# Patient Record
Sex: MALE | Race: WHITE | Employment: OTHER | URBAN - METROPOLITAN AREA
[De-identification: names, ages, dates, MRNs, and addresses within clinical notes are randomized per-mention and may not be internally consistent; named-entity substitution may affect disease eponyms.]

---

## 2017-08-11 ENCOUNTER — APPOINTMENT (OUTPATIENT)
Dept: GENERAL RADIOLOGY | Age: 82
DRG: 251 | End: 2017-08-11
Payer: MEDICARE

## 2017-08-11 ENCOUNTER — HOSPITAL ENCOUNTER (INPATIENT)
Age: 82
LOS: 3 days | Discharge: HOME | DRG: 251 | End: 2017-08-15
Attending: EMERGENCY MEDICINE | Admitting: INTERNAL MEDICINE
Payer: MEDICARE

## 2017-08-11 DIAGNOSIS — R91.8 PULMONARY MASS: ICD-10-CM

## 2017-08-11 DIAGNOSIS — J18.9 PNEUMONIA DUE TO ORGANISM: ICD-10-CM

## 2017-08-11 DIAGNOSIS — R07.9 CHEST PAIN, UNSPECIFIED TYPE: Primary | ICD-10-CM

## 2017-08-11 LAB
ANION GAP SERPL CALCULATED.3IONS-SCNC: 22 MEQ/L (ref 7–13)
BASOPHILS ABSOLUTE: 0.2 K/UL (ref 0–0.2)
BASOPHILS RELATIVE PERCENT: 0.9 %
BUN BLDV-MCNC: 49 MG/DL (ref 8–23)
CALCIUM SERPL-MCNC: 9.8 MG/DL (ref 8.6–10.2)
CHLORIDE BLD-SCNC: 104 MEQ/L (ref 98–107)
CO2: 18 MEQ/L (ref 22–29)
CREAT SERPL-MCNC: 2.08 MG/DL (ref 0.7–1.2)
EOSINOPHILS ABSOLUTE: 0 K/UL (ref 0–0.7)
EOSINOPHILS RELATIVE PERCENT: 0 %
GFR AFRICAN AMERICAN: 36.7
GFR NON-AFRICAN AMERICAN: 30.4
GLUCOSE BLD-MCNC: 335 MG/DL (ref 74–109)
HCT VFR BLD CALC: 43.9 % (ref 42–52)
HEMOGLOBIN: 14.1 G/DL (ref 14–18)
INR BLD: 1
LYMPHOCYTES ABSOLUTE: 0.6 K/UL (ref 1–4.8)
LYMPHOCYTES RELATIVE PERCENT: 3 %
MCH RBC QN AUTO: 28.3 PG (ref 27–31.3)
MCHC RBC AUTO-ENTMCNC: 32 % (ref 33–37)
MCV RBC AUTO: 88.3 FL (ref 80–100)
MONOCYTES ABSOLUTE: 0.6 K/UL (ref 0.2–0.8)
MONOCYTES RELATIVE PERCENT: 3.2 %
NEUTROPHILS ABSOLUTE: 17.8 K/UL (ref 1.4–6.5)
NEUTROPHILS RELATIVE PERCENT: 92.9 %
PDW BLD-RTO: 14.7 % (ref 11.5–14.5)
PLATELET # BLD: 239 K/UL (ref 130–400)
POC CREATININE WHOLE BLOOD: 2.2
POTASSIUM SERPL-SCNC: 4.4 MEQ/L (ref 3.5–5.1)
PROTHROMBIN TIME: 10.7 SEC (ref 8.1–13.7)
RBC # BLD: 4.97 M/UL (ref 4.7–6.1)
SODIUM BLD-SCNC: 144 MEQ/L (ref 132–144)
TROPONIN: 0.14 NG/ML (ref 0–0.01)
WBC # BLD: 19.2 K/UL (ref 4.8–10.8)

## 2017-08-11 PROCEDURE — 84484 ASSAY OF TROPONIN QUANT: CPT

## 2017-08-11 PROCEDURE — 93005 ELECTROCARDIOGRAM TRACING: CPT

## 2017-08-11 PROCEDURE — 80048 BASIC METABOLIC PNL TOTAL CA: CPT

## 2017-08-11 PROCEDURE — 6360000002 HC RX W HCPCS: Performed by: EMERGENCY MEDICINE

## 2017-08-11 PROCEDURE — 85025 COMPLETE CBC W/AUTO DIFF WBC: CPT

## 2017-08-11 PROCEDURE — 2580000003 HC RX 258: Performed by: EMERGENCY MEDICINE

## 2017-08-11 PROCEDURE — 36415 COLL VENOUS BLD VENIPUNCTURE: CPT

## 2017-08-11 PROCEDURE — 71010 XR CHEST PORTABLE: CPT

## 2017-08-11 PROCEDURE — 94640 AIRWAY INHALATION TREATMENT: CPT

## 2017-08-11 PROCEDURE — 96374 THER/PROPH/DIAG INJ IV PUSH: CPT

## 2017-08-11 PROCEDURE — 99285 EMERGENCY DEPT VISIT HI MDM: CPT

## 2017-08-11 PROCEDURE — 85610 PROTHROMBIN TIME: CPT

## 2017-08-11 RX ORDER — SODIUM CHLORIDE 0.9 % (FLUSH) 0.9 %
10 SYRINGE (ML) INJECTION EVERY 12 HOURS SCHEDULED
Status: DISCONTINUED | OUTPATIENT
Start: 2017-08-11 | End: 2017-08-15 | Stop reason: HOSPADM

## 2017-08-11 RX ORDER — SODIUM CHLORIDE 0.9 % (FLUSH) 0.9 %
3 SYRINGE (ML) INJECTION EVERY 8 HOURS
Status: DISCONTINUED | OUTPATIENT
Start: 2017-08-11 | End: 2017-08-12

## 2017-08-11 RX ORDER — SODIUM CHLORIDE 0.9 % (FLUSH) 0.9 %
10 SYRINGE (ML) INJECTION PRN
Status: DISCONTINUED | OUTPATIENT
Start: 2017-08-11 | End: 2017-08-15 | Stop reason: HOSPADM

## 2017-08-11 RX ORDER — AZITHROMYCIN 500 MG/1
500 TABLET, FILM COATED ORAL ONCE
Status: COMPLETED | OUTPATIENT
Start: 2017-08-11 | End: 2017-08-12

## 2017-08-11 RX ORDER — ACETAMINOPHEN 325 MG/1
650 TABLET ORAL EVERY 4 HOURS PRN
Status: DISCONTINUED | OUTPATIENT
Start: 2017-08-11 | End: 2017-08-15 | Stop reason: HOSPADM

## 2017-08-11 RX ORDER — METHYLPREDNISOLONE SODIUM SUCCINATE 125 MG/2ML
125 INJECTION, POWDER, LYOPHILIZED, FOR SOLUTION INTRAMUSCULAR; INTRAVENOUS ONCE
Status: COMPLETED | OUTPATIENT
Start: 2017-08-11 | End: 2017-08-11

## 2017-08-11 RX ORDER — ONDANSETRON 4 MG/1
4 TABLET, ORALLY DISINTEGRATING ORAL EVERY 8 HOURS PRN
Status: DISCONTINUED | OUTPATIENT
Start: 2017-08-11 | End: 2017-08-15 | Stop reason: HOSPADM

## 2017-08-11 RX ORDER — MORPHINE SULFATE 2 MG/ML
2 INJECTION, SOLUTION INTRAMUSCULAR; INTRAVENOUS
Status: DISCONTINUED | OUTPATIENT
Start: 2017-08-11 | End: 2017-08-15 | Stop reason: HOSPADM

## 2017-08-11 RX ORDER — LORAZEPAM 2 MG/ML
0.5 INJECTION INTRAMUSCULAR ONCE
Status: COMPLETED | OUTPATIENT
Start: 2017-08-11 | End: 2017-08-12

## 2017-08-11 RX ORDER — MORPHINE SULFATE 4 MG/ML
4 INJECTION, SOLUTION INTRAMUSCULAR; INTRAVENOUS
Status: DISCONTINUED | OUTPATIENT
Start: 2017-08-11 | End: 2017-08-15 | Stop reason: HOSPADM

## 2017-08-11 RX ORDER — LEVOTHYROXINE SODIUM 0.03 MG/1
25 TABLET ORAL DAILY
COMMUNITY

## 2017-08-11 RX ORDER — MORPHINE SULFATE 2 MG/ML
2 INJECTION, SOLUTION INTRAMUSCULAR; INTRAVENOUS ONCE
Status: COMPLETED | OUTPATIENT
Start: 2017-08-11 | End: 2017-08-12

## 2017-08-11 RX ADMIN — ALBUTEROL SULFATE 5 MG: 2.5 SOLUTION RESPIRATORY (INHALATION) at 22:54

## 2017-08-11 RX ADMIN — METHYLPREDNISOLONE SODIUM SUCCINATE 125 MG: 125 INJECTION, POWDER, FOR SOLUTION INTRAMUSCULAR; INTRAVENOUS at 22:50

## 2017-08-11 RX ADMIN — SODIUM CHLORIDE, PRESERVATIVE FREE 3 ML: 5 INJECTION INTRAVENOUS at 22:52

## 2017-08-12 PROBLEM — R07.9 CHEST PAIN: Status: ACTIVE | Noted: 2017-08-12

## 2017-08-12 LAB
ANION GAP SERPL CALCULATED.3IONS-SCNC: 16 MEQ/L (ref 7–13)
BUN BLDV-MCNC: 49 MG/DL (ref 8–23)
CALCIUM SERPL-MCNC: 9.2 MG/DL (ref 8.6–10.2)
CHLORIDE BLD-SCNC: 102 MEQ/L (ref 98–107)
CO2: 20 MEQ/L (ref 22–29)
CREAT SERPL-MCNC: 1.58 MG/DL (ref 0.7–1.2)
GFR AFRICAN AMERICAN: 50.5
GFR NON-AFRICAN AMERICAN: 41.7
GLUCOSE BLD-MCNC: 194 MG/DL (ref 60–115)
GLUCOSE BLD-MCNC: 305 MG/DL (ref 74–109)
GLUCOSE BLD-MCNC: 404 MG/DL (ref 60–115)
GLUCOSE BLD-MCNC: 438 MG/DL (ref 60–115)
HCT VFR BLD CALC: 39.9 % (ref 42–52)
HEMOGLOBIN: 13 G/DL (ref 14–18)
MCH RBC QN AUTO: 28.3 PG (ref 27–31.3)
MCHC RBC AUTO-ENTMCNC: 32.6 % (ref 33–37)
MCV RBC AUTO: 86.5 FL (ref 80–100)
PDW BLD-RTO: 14.4 % (ref 11.5–14.5)
PERFORMED ON: ABNORMAL
PLATELET # BLD: 195 K/UL (ref 130–400)
POTASSIUM SERPL-SCNC: 4.2 MEQ/L (ref 3.5–5.1)
RBC # BLD: 4.61 M/UL (ref 4.7–6.1)
SODIUM BLD-SCNC: 138 MEQ/L (ref 132–144)
TROPONIN: 0.08 NG/ML (ref 0–0.01)
TROPONIN: 0.1 NG/ML (ref 0–0.01)
WBC # BLD: 18.9 K/UL (ref 4.8–10.8)

## 2017-08-12 PROCEDURE — 2580000003 HC RX 258: Performed by: EMERGENCY MEDICINE

## 2017-08-12 PROCEDURE — 36415 COLL VENOUS BLD VENIPUNCTURE: CPT

## 2017-08-12 PROCEDURE — 6370000000 HC RX 637 (ALT 250 FOR IP): Performed by: INTERNAL MEDICINE

## 2017-08-12 PROCEDURE — 6370000000 HC RX 637 (ALT 250 FOR IP): Performed by: EMERGENCY MEDICINE

## 2017-08-12 PROCEDURE — 80048 BASIC METABOLIC PNL TOTAL CA: CPT

## 2017-08-12 PROCEDURE — 6360000002 HC RX W HCPCS: Performed by: EMERGENCY MEDICINE

## 2017-08-12 PROCEDURE — 84484 ASSAY OF TROPONIN QUANT: CPT

## 2017-08-12 PROCEDURE — 87040 BLOOD CULTURE FOR BACTERIA: CPT

## 2017-08-12 PROCEDURE — 93005 ELECTROCARDIOGRAM TRACING: CPT

## 2017-08-12 PROCEDURE — 2060000000 HC ICU INTERMEDIATE R&B

## 2017-08-12 PROCEDURE — 85027 COMPLETE CBC AUTOMATED: CPT

## 2017-08-12 RX ORDER — METOPROLOL SUCCINATE 50 MG/1
50 TABLET, EXTENDED RELEASE ORAL DAILY
Status: DISCONTINUED | OUTPATIENT
Start: 2017-08-12 | End: 2017-08-15 | Stop reason: HOSPADM

## 2017-08-12 RX ORDER — AMLODIPINE BESYLATE 5 MG/1
5 TABLET ORAL DAILY
Status: DISCONTINUED | OUTPATIENT
Start: 2017-08-12 | End: 2017-08-15 | Stop reason: HOSPADM

## 2017-08-12 RX ORDER — CLOPIDOGREL 300 MG/1
600 TABLET, FILM COATED ORAL ONCE
Status: COMPLETED | OUTPATIENT
Start: 2017-08-12 | End: 2017-08-12

## 2017-08-12 RX ORDER — NICOTINE POLACRILEX 4 MG
15 LOZENGE BUCCAL PRN
Status: DISCONTINUED | OUTPATIENT
Start: 2017-08-12 | End: 2017-08-15 | Stop reason: HOSPADM

## 2017-08-12 RX ORDER — DEXTROSE MONOHYDRATE 25 G/50ML
12.5 INJECTION, SOLUTION INTRAVENOUS PRN
Status: DISCONTINUED | OUTPATIENT
Start: 2017-08-12 | End: 2017-08-15 | Stop reason: HOSPADM

## 2017-08-12 RX ORDER — ASPIRIN 81 MG/1
81 TABLET ORAL DAILY
Status: DISCONTINUED | OUTPATIENT
Start: 2017-08-12 | End: 2017-08-15 | Stop reason: HOSPADM

## 2017-08-12 RX ORDER — DEXTROSE MONOHYDRATE 50 MG/ML
100 INJECTION, SOLUTION INTRAVENOUS PRN
Status: DISCONTINUED | OUTPATIENT
Start: 2017-08-12 | End: 2017-08-15 | Stop reason: HOSPADM

## 2017-08-12 RX ORDER — ROPINIROLE 2 MG/1
2 TABLET, FILM COATED ORAL NIGHTLY
COMMUNITY
Start: 2017-08-12

## 2017-08-12 RX ORDER — ISOSORBIDE MONONITRATE 30 MG/1
30 TABLET, EXTENDED RELEASE ORAL DAILY
Status: DISCONTINUED | OUTPATIENT
Start: 2017-08-12 | End: 2017-08-15 | Stop reason: HOSPADM

## 2017-08-12 RX ORDER — ACETAMINOPHEN 80 MG
TABLET,CHEWABLE ORAL ONCE
Status: DISCONTINUED | OUTPATIENT
Start: 2017-08-12 | End: 2017-08-15 | Stop reason: HOSPADM

## 2017-08-12 RX ORDER — CLOPIDOGREL BISULFATE 75 MG/1
75 TABLET ORAL DAILY
Status: DISCONTINUED | OUTPATIENT
Start: 2017-08-13 | End: 2017-08-15 | Stop reason: HOSPADM

## 2017-08-12 RX ORDER — PANTOPRAZOLE SODIUM 40 MG/1
40 TABLET, DELAYED RELEASE ORAL
Status: DISCONTINUED | OUTPATIENT
Start: 2017-08-12 | End: 2017-08-15 | Stop reason: HOSPADM

## 2017-08-12 RX ADMIN — CEFTRIAXONE SODIUM 1 G: 1 INJECTION, POWDER, FOR SOLUTION INTRAMUSCULAR; INTRAVENOUS at 00:20

## 2017-08-12 RX ADMIN — PANTOPRAZOLE SODIUM 40 MG: 40 TABLET, DELAYED RELEASE ORAL at 09:07

## 2017-08-12 RX ADMIN — AMLODIPINE BESYLATE 5 MG: 5 TABLET ORAL at 09:08

## 2017-08-12 RX ADMIN — LORAZEPAM 0.5 MG: 2 INJECTION INTRAMUSCULAR; INTRAVENOUS at 00:19

## 2017-08-12 RX ADMIN — CARBIDOPA AND LEVODOPA 1.5 TABLET: 25; 100 TABLET ORAL at 21:40

## 2017-08-12 RX ADMIN — METOPROLOL SUCCINATE 50 MG: 50 TABLET, EXTENDED RELEASE ORAL at 13:48

## 2017-08-12 RX ADMIN — ENOXAPARIN SODIUM 40 MG: 40 INJECTION SUBCUTANEOUS at 21:41

## 2017-08-12 RX ADMIN — CARBIDOPA AND LEVODOPA 1.5 TABLET: 25; 100 TABLET ORAL at 13:48

## 2017-08-12 RX ADMIN — AZITHROMYCIN 500 MG: 500 TABLET, FILM COATED ORAL at 00:20

## 2017-08-12 RX ADMIN — CARBIDOPA AND LEVODOPA 1.5 TABLET: 25; 100 TABLET ORAL at 09:08

## 2017-08-12 RX ADMIN — CLOPIDOGREL BISULFATE 600 MG: 300 TABLET, FILM COATED ORAL at 13:49

## 2017-08-12 RX ADMIN — Medication 10 ML: at 00:27

## 2017-08-12 RX ADMIN — INSULIN LISPRO 12 UNITS: 100 INJECTION, SOLUTION INTRAVENOUS; SUBCUTANEOUS at 16:25

## 2017-08-12 RX ADMIN — MORPHINE SULFATE 2 MG: 2 INJECTION, SOLUTION INTRAMUSCULAR; INTRAVENOUS at 00:19

## 2017-08-12 RX ADMIN — ASPIRIN 81 MG: 81 TABLET, COATED ORAL at 13:48

## 2017-08-12 RX ADMIN — ISOSORBIDE MONONITRATE 30 MG: 30 TABLET, EXTENDED RELEASE ORAL at 13:48

## 2017-08-12 RX ADMIN — Medication 10 ML: at 09:15

## 2017-08-12 ASSESSMENT — PAIN SCALES - GENERAL: PAINLEVEL_OUTOF10: 7

## 2017-08-13 LAB
GLUCOSE BLD-MCNC: 196 MG/DL (ref 60–115)
GLUCOSE BLD-MCNC: 218 MG/DL (ref 60–115)
GLUCOSE BLD-MCNC: 232 MG/DL (ref 60–115)
GLUCOSE BLD-MCNC: 237 MG/DL (ref 60–115)
LV EF: 60 %
LVEF MODALITY: NORMAL
PERFORMED ON: ABNORMAL

## 2017-08-13 PROCEDURE — 6370000000 HC RX 637 (ALT 250 FOR IP): Performed by: INTERNAL MEDICINE

## 2017-08-13 PROCEDURE — 97165 OT EVAL LOW COMPLEX 30 MIN: CPT

## 2017-08-13 PROCEDURE — G8989 SELF CARE D/C STATUS: HCPCS

## 2017-08-13 PROCEDURE — G8978 MOBILITY CURRENT STATUS: HCPCS

## 2017-08-13 PROCEDURE — 2060000000 HC ICU INTERMEDIATE R&B

## 2017-08-13 PROCEDURE — 97162 PT EVAL MOD COMPLEX 30 MIN: CPT

## 2017-08-13 PROCEDURE — 93306 TTE W/DOPPLER COMPLETE: CPT

## 2017-08-13 PROCEDURE — 2580000003 HC RX 258: Performed by: EMERGENCY MEDICINE

## 2017-08-13 PROCEDURE — G8987 SELF CARE CURRENT STATUS: HCPCS

## 2017-08-13 PROCEDURE — G8988 SELF CARE GOAL STATUS: HCPCS

## 2017-08-13 PROCEDURE — G8979 MOBILITY GOAL STATUS: HCPCS

## 2017-08-13 PROCEDURE — 6360000002 HC RX W HCPCS: Performed by: INTERNAL MEDICINE

## 2017-08-13 RX ORDER — ZOLPIDEM TARTRATE 5 MG/1
10 TABLET ORAL NIGHTLY PRN
Status: DISCONTINUED | OUTPATIENT
Start: 2017-08-13 | End: 2017-08-15 | Stop reason: HOSPADM

## 2017-08-13 RX ORDER — ZOLPIDEM TARTRATE 5 MG/1
10 TABLET ORAL NIGHTLY PRN
Status: DISCONTINUED | OUTPATIENT
Start: 2017-08-13 | End: 2017-08-13 | Stop reason: SDUPTHER

## 2017-08-13 RX ADMIN — METOPROLOL SUCCINATE 50 MG: 50 TABLET, EXTENDED RELEASE ORAL at 09:07

## 2017-08-13 RX ADMIN — ZOLPIDEM TARTRATE 5 MG: 5 TABLET ORAL at 23:00

## 2017-08-13 RX ADMIN — PANTOPRAZOLE SODIUM 40 MG: 40 TABLET, DELAYED RELEASE ORAL at 06:30

## 2017-08-13 RX ADMIN — ENOXAPARIN SODIUM 30 MG: 30 INJECTION SUBCUTANEOUS at 14:25

## 2017-08-13 RX ADMIN — CARBIDOPA AND LEVODOPA 1.5 TABLET: 25; 100 TABLET ORAL at 09:07

## 2017-08-13 RX ADMIN — CLOPIDOGREL BISULFATE 75 MG: 75 TABLET ORAL at 09:07

## 2017-08-13 RX ADMIN — INSULIN LISPRO 2 UNITS: 100 INJECTION, SOLUTION INTRAVENOUS; SUBCUTANEOUS at 12:35

## 2017-08-13 RX ADMIN — ASPIRIN 81 MG: 81 TABLET, COATED ORAL at 09:07

## 2017-08-13 RX ADMIN — CARBIDOPA AND LEVODOPA 1.5 TABLET: 25; 100 TABLET ORAL at 21:34

## 2017-08-13 RX ADMIN — CARBIDOPA AND LEVODOPA 1.5 TABLET: 25; 100 TABLET ORAL at 14:24

## 2017-08-13 RX ADMIN — INSULIN LISPRO 4 UNITS: 100 INJECTION, SOLUTION INTRAVENOUS; SUBCUTANEOUS at 17:52

## 2017-08-13 RX ADMIN — AMLODIPINE BESYLATE 5 MG: 5 TABLET ORAL at 09:07

## 2017-08-13 RX ADMIN — Medication 10 ML: at 21:36

## 2017-08-13 RX ADMIN — INSULIN LISPRO 4 UNITS: 100 INJECTION, SOLUTION INTRAVENOUS; SUBCUTANEOUS at 09:11

## 2017-08-13 RX ADMIN — ISOSORBIDE MONONITRATE 30 MG: 30 TABLET, EXTENDED RELEASE ORAL at 09:07

## 2017-08-13 ASSESSMENT — PAIN SCALES - GENERAL: PAINLEVEL_OUTOF10: 0

## 2017-08-14 ENCOUNTER — APPOINTMENT (OUTPATIENT)
Dept: CARDIAC CATH/INVASIVE PROCEDURES | Age: 82
DRG: 251 | End: 2017-08-14
Payer: MEDICARE

## 2017-08-14 LAB
ANION GAP SERPL CALCULATED.3IONS-SCNC: 15 MEQ/L (ref 7–13)
BUN BLDV-MCNC: 38 MG/DL (ref 8–23)
CALCIUM SERPL-MCNC: 9.3 MG/DL (ref 8.6–10.2)
CHLORIDE BLD-SCNC: 102 MEQ/L (ref 98–107)
CO2: 23 MEQ/L (ref 22–29)
CREAT SERPL-MCNC: 1.43 MG/DL (ref 0.7–1.2)
GFR AFRICAN AMERICAN: 34
GFR AFRICAN AMERICAN: 56.6
GFR NON-AFRICAN AMERICAN: 28
GFR NON-AFRICAN AMERICAN: 46.8
GLUCOSE BLD-MCNC: 174 MG/DL (ref 60–115)
GLUCOSE BLD-MCNC: 178 MG/DL (ref 74–109)
GLUCOSE BLD-MCNC: 180 MG/DL (ref 60–115)
GLUCOSE BLD-MCNC: 190 MG/DL (ref 60–115)
GLUCOSE BLD-MCNC: 199 MG/DL (ref 60–115)
PERFORMED ON: ABNORMAL
POC CREATININE: 2.2 MG/DL (ref 0.8–1.3)
POC SAMPLE TYPE: ABNORMAL
POTASSIUM SERPL-SCNC: 4.3 MEQ/L (ref 3.5–5.1)
SODIUM BLD-SCNC: 140 MEQ/L (ref 132–144)

## 2017-08-14 PROCEDURE — 93005 ELECTROCARDIOGRAM TRACING: CPT

## 2017-08-14 PROCEDURE — 93571 IV DOP VEL&/PRESS C FLO 1ST: CPT | Performed by: INTERNAL MEDICINE

## 2017-08-14 PROCEDURE — 2580000003 HC RX 258: Performed by: INTERNAL MEDICINE

## 2017-08-14 PROCEDURE — C1769 GUIDE WIRE: HCPCS

## 2017-08-14 PROCEDURE — 2060000000 HC ICU INTERMEDIATE R&B

## 2017-08-14 PROCEDURE — 6370000000 HC RX 637 (ALT 250 FOR IP): Performed by: INTERNAL MEDICINE

## 2017-08-14 PROCEDURE — B2111ZZ FLUOROSCOPY OF MULTIPLE CORONARY ARTERIES USING LOW OSMOLAR CONTRAST: ICD-10-PCS | Performed by: INTERNAL MEDICINE

## 2017-08-14 PROCEDURE — C1887 CATHETER, GUIDING: HCPCS

## 2017-08-14 PROCEDURE — 4A033BC MEASUREMENT OF ARTERIAL PRESSURE, CORONARY, PERCUTANEOUS APPROACH: ICD-10-PCS | Performed by: INTERNAL MEDICINE

## 2017-08-14 PROCEDURE — 93010 ELECTROCARDIOGRAM REPORT: CPT | Performed by: INTERNAL MEDICINE

## 2017-08-14 PROCEDURE — C1726 CATH, BAL DIL, NON-VASCULAR: HCPCS

## 2017-08-14 PROCEDURE — 6360000002 HC RX W HCPCS

## 2017-08-14 PROCEDURE — 93458 L HRT ARTERY/VENTRICLE ANGIO: CPT | Performed by: INTERNAL MEDICINE

## 2017-08-14 PROCEDURE — 2580000003 HC RX 258

## 2017-08-14 PROCEDURE — B41F1ZZ FLUOROSCOPY OF RIGHT LOWER EXTREMITY ARTERIES USING LOW OSMOLAR CONTRAST: ICD-10-PCS | Performed by: INTERNAL MEDICINE

## 2017-08-14 PROCEDURE — 80048 BASIC METABOLIC PNL TOTAL CA: CPT

## 2017-08-14 PROCEDURE — 4A023N7 MEASUREMENT OF CARDIAC SAMPLING AND PRESSURE, LEFT HEART, PERCUTANEOUS APPROACH: ICD-10-PCS | Performed by: INTERNAL MEDICINE

## 2017-08-14 PROCEDURE — 02703ZZ DILATION OF CORONARY ARTERY, ONE ARTERY, PERCUTANEOUS APPROACH: ICD-10-PCS | Performed by: INTERNAL MEDICINE

## 2017-08-14 PROCEDURE — C1725 CATH, TRANSLUMIN NON-LASER: HCPCS

## 2017-08-14 PROCEDURE — 93572 IV DOP VEL&/PRESS C FLO EA: CPT | Performed by: INTERNAL MEDICINE

## 2017-08-14 PROCEDURE — 97112 NEUROMUSCULAR REEDUCATION: CPT

## 2017-08-14 PROCEDURE — 85347 COAGULATION TIME ACTIVATED: CPT

## 2017-08-14 PROCEDURE — 36415 COLL VENOUS BLD VENIPUNCTURE: CPT

## 2017-08-14 PROCEDURE — 82948 REAGENT STRIP/BLOOD GLUCOSE: CPT

## 2017-08-14 PROCEDURE — 2580000003 HC RX 258: Performed by: EMERGENCY MEDICINE

## 2017-08-14 PROCEDURE — 2500000003 HC RX 250 WO HCPCS

## 2017-08-14 PROCEDURE — 2720000010 HC SURG SUPPLY STERILE

## 2017-08-14 PROCEDURE — C1894 INTRO/SHEATH, NON-LASER: HCPCS

## 2017-08-14 RX ORDER — DIPHENHYDRAMINE HCL 25 MG
50 TABLET ORAL ONCE
Status: DISCONTINUED | OUTPATIENT
Start: 2017-08-14 | End: 2017-08-14

## 2017-08-14 RX ORDER — SODIUM CHLORIDE 0.9 % (FLUSH) 0.9 %
10 SYRINGE (ML) INJECTION PRN
Status: DISCONTINUED | OUTPATIENT
Start: 2017-08-14 | End: 2017-08-15 | Stop reason: HOSPADM

## 2017-08-14 RX ORDER — ACETAMINOPHEN 325 MG/1
650 TABLET ORAL EVERY 4 HOURS PRN
Status: DISCONTINUED | OUTPATIENT
Start: 2017-08-14 | End: 2017-08-15 | Stop reason: HOSPADM

## 2017-08-14 RX ORDER — SODIUM CHLORIDE 0.9 % (FLUSH) 0.9 %
10 SYRINGE (ML) INJECTION EVERY 12 HOURS SCHEDULED
Status: DISCONTINUED | OUTPATIENT
Start: 2017-08-14 | End: 2017-08-14 | Stop reason: SDUPTHER

## 2017-08-14 RX ORDER — SODIUM CHLORIDE 0.9 % (FLUSH) 0.9 %
10 SYRINGE (ML) INJECTION PRN
Status: DISCONTINUED | OUTPATIENT
Start: 2017-08-14 | End: 2017-08-14 | Stop reason: SDUPTHER

## 2017-08-14 RX ORDER — NITROGLYCERIN 0.4 MG/1
0.4 TABLET SUBLINGUAL EVERY 5 MIN PRN
Status: DISCONTINUED | OUTPATIENT
Start: 2017-08-14 | End: 2017-08-15 | Stop reason: HOSPADM

## 2017-08-14 RX ORDER — ASPIRIN 81 MG/1
81 TABLET ORAL ONCE
Status: DISCONTINUED | OUTPATIENT
Start: 2017-08-14 | End: 2017-08-14

## 2017-08-14 RX ORDER — SODIUM CHLORIDE 0.9 % (FLUSH) 0.9 %
10 SYRINGE (ML) INJECTION EVERY 12 HOURS SCHEDULED
Status: DISCONTINUED | OUTPATIENT
Start: 2017-08-14 | End: 2017-08-15 | Stop reason: HOSPADM

## 2017-08-14 RX ORDER — DIAZEPAM 5 MG/1
5 TABLET ORAL
Status: DISCONTINUED | OUTPATIENT
Start: 2017-08-14 | End: 2017-08-14

## 2017-08-14 RX ORDER — SODIUM CHLORIDE 9 MG/ML
INJECTION, SOLUTION INTRAVENOUS CONTINUOUS
Status: DISCONTINUED | OUTPATIENT
Start: 2017-08-14 | End: 2017-08-15

## 2017-08-14 RX ORDER — ONDANSETRON 2 MG/ML
4 INJECTION INTRAMUSCULAR; INTRAVENOUS EVERY 6 HOURS PRN
Status: DISCONTINUED | OUTPATIENT
Start: 2017-08-14 | End: 2017-08-15 | Stop reason: HOSPADM

## 2017-08-14 RX ADMIN — CLOPIDOGREL BISULFATE 75 MG: 75 TABLET ORAL at 08:38

## 2017-08-14 RX ADMIN — PANTOPRAZOLE SODIUM 40 MG: 40 TABLET, DELAYED RELEASE ORAL at 08:38

## 2017-08-14 RX ADMIN — SODIUM CHLORIDE: 9 INJECTION, SOLUTION INTRAVENOUS at 08:38

## 2017-08-14 RX ADMIN — INSULIN LISPRO 2 UNITS: 100 INJECTION, SOLUTION INTRAVENOUS; SUBCUTANEOUS at 17:20

## 2017-08-14 RX ADMIN — ASPIRIN 81 MG: 81 TABLET, COATED ORAL at 08:39

## 2017-08-14 RX ADMIN — SODIUM CHLORIDE: 9 INJECTION, SOLUTION INTRAVENOUS at 21:19

## 2017-08-14 RX ADMIN — ISOSORBIDE MONONITRATE 30 MG: 30 TABLET, EXTENDED RELEASE ORAL at 08:39

## 2017-08-14 RX ADMIN — CARBIDOPA AND LEVODOPA 1.5 TABLET: 25; 100 TABLET ORAL at 08:38

## 2017-08-14 RX ADMIN — Medication 10 ML: at 08:40

## 2017-08-14 RX ADMIN — AMLODIPINE BESYLATE 5 MG: 5 TABLET ORAL at 08:39

## 2017-08-14 RX ADMIN — Medication 10 ML: at 21:25

## 2017-08-14 RX ADMIN — CARBIDOPA AND LEVODOPA 1.5 TABLET: 25; 100 TABLET ORAL at 21:23

## 2017-08-14 RX ADMIN — METOPROLOL SUCCINATE 50 MG: 50 TABLET, EXTENDED RELEASE ORAL at 08:38

## 2017-08-14 RX ADMIN — ZOLPIDEM TARTRATE 10 MG: 5 TABLET ORAL at 21:29

## 2017-08-15 VITALS
HEIGHT: 65 IN | WEIGHT: 166 LBS | OXYGEN SATURATION: 97 % | DIASTOLIC BLOOD PRESSURE: 73 MMHG | SYSTOLIC BLOOD PRESSURE: 157 MMHG | BODY MASS INDEX: 27.66 KG/M2 | TEMPERATURE: 97.7 F | RESPIRATION RATE: 18 BRPM | HEART RATE: 69 BPM

## 2017-08-15 LAB
ANION GAP SERPL CALCULATED.3IONS-SCNC: 14 MEQ/L (ref 7–13)
BUN BLDV-MCNC: 29 MG/DL (ref 8–23)
CALCIUM SERPL-MCNC: 9.1 MG/DL (ref 8.6–10.2)
CHLORIDE BLD-SCNC: 103 MEQ/L (ref 98–107)
CO2: 23 MEQ/L (ref 22–29)
CREAT SERPL-MCNC: 1.14 MG/DL (ref 0.7–1.2)
GFR AFRICAN AMERICAN: >60
GFR NON-AFRICAN AMERICAN: >60
GLUCOSE BLD-MCNC: 163 MG/DL (ref 60–115)
GLUCOSE BLD-MCNC: 183 MG/DL (ref 74–109)
GLUCOSE BLD-MCNC: 318 MG/DL (ref 60–115)
PERFORMED ON: ABNORMAL
PERFORMED ON: ABNORMAL
POTASSIUM SERPL-SCNC: 4.3 MEQ/L (ref 3.5–5.1)
SODIUM BLD-SCNC: 140 MEQ/L (ref 132–144)

## 2017-08-15 PROCEDURE — 6370000000 HC RX 637 (ALT 250 FOR IP): Performed by: INTERNAL MEDICINE

## 2017-08-15 PROCEDURE — 97110 THERAPEUTIC EXERCISES: CPT

## 2017-08-15 PROCEDURE — 97116 GAIT TRAINING THERAPY: CPT

## 2017-08-15 PROCEDURE — 2580000003 HC RX 258: Performed by: INTERNAL MEDICINE

## 2017-08-15 PROCEDURE — 6360000002 HC RX W HCPCS: Performed by: INTERNAL MEDICINE

## 2017-08-15 PROCEDURE — 80048 BASIC METABOLIC PNL TOTAL CA: CPT

## 2017-08-15 PROCEDURE — 36415 COLL VENOUS BLD VENIPUNCTURE: CPT

## 2017-08-15 RX ADMIN — Medication 10 ML: at 08:15

## 2017-08-15 RX ADMIN — METOPROLOL SUCCINATE 50 MG: 50 TABLET, EXTENDED RELEASE ORAL at 08:15

## 2017-08-15 RX ADMIN — ENOXAPARIN SODIUM 30 MG: 30 INJECTION SUBCUTANEOUS at 08:15

## 2017-08-15 RX ADMIN — CLOPIDOGREL BISULFATE 75 MG: 75 TABLET ORAL at 08:15

## 2017-08-15 RX ADMIN — ISOSORBIDE MONONITRATE 30 MG: 30 TABLET, EXTENDED RELEASE ORAL at 08:15

## 2017-08-15 RX ADMIN — AMLODIPINE BESYLATE 5 MG: 5 TABLET ORAL at 08:15

## 2017-08-15 RX ADMIN — PANTOPRAZOLE SODIUM 40 MG: 40 TABLET, DELAYED RELEASE ORAL at 05:48

## 2017-08-15 RX ADMIN — CARBIDOPA AND LEVODOPA 1.5 TABLET: 25; 100 TABLET ORAL at 08:14

## 2017-08-15 RX ADMIN — ASPIRIN 81 MG: 81 TABLET, COATED ORAL at 08:14

## 2017-08-15 RX ADMIN — INSULIN LISPRO 8 UNITS: 100 INJECTION, SOLUTION INTRAVENOUS; SUBCUTANEOUS at 12:08

## 2017-08-15 ASSESSMENT — PAIN SCALES - GENERAL
PAINLEVEL_OUTOF10: 0
PAINLEVEL_OUTOF10: 0

## 2017-08-16 LAB
EKG ATRIAL RATE: 107 BPM
EKG ATRIAL RATE: 64 BPM
EKG ATRIAL RATE: 84 BPM
EKG P AXIS: 38 DEGREES
EKG P AXIS: 50 DEGREES
EKG P AXIS: 51 DEGREES
EKG P-R INTERVAL: 206 MS
EKG P-R INTERVAL: 216 MS
EKG P-R INTERVAL: 220 MS
EKG Q-T INTERVAL: 320 MS
EKG Q-T INTERVAL: 366 MS
EKG Q-T INTERVAL: 410 MS
EKG QRS DURATION: 68 MS
EKG QRS DURATION: 88 MS
EKG QRS DURATION: 88 MS
EKG QTC CALCULATION (BAZETT): 422 MS
EKG QTC CALCULATION (BAZETT): 427 MS
EKG QTC CALCULATION (BAZETT): 432 MS
EKG R AXIS: -11 DEGREES
EKG R AXIS: -28 DEGREES
EKG R AXIS: 43 DEGREES
EKG T AXIS: 46 DEGREES
EKG T AXIS: 57 DEGREES
EKG T AXIS: 78 DEGREES
EKG VENTRICULAR RATE: 107 BPM
EKG VENTRICULAR RATE: 64 BPM
EKG VENTRICULAR RATE: 84 BPM

## 2017-08-17 LAB
BLOOD CULTURE, ROUTINE: NORMAL
CULTURE, BLOOD 2: NORMAL

## 2017-08-18 LAB
PERFORMED ON: ABNORMAL
PERFORMED ON: ABNORMAL
POC ACTIVATED CLOTTING TIME KAOLIN: 202 SEC (ref 82–152)
POC ACTIVATED CLOTTING TIME KAOLIN: 241 SEC (ref 82–152)
POC SAMPLE TYPE: ABNORMAL
POC SAMPLE TYPE: ABNORMAL

## 2017-09-21 ENCOUNTER — HOSPITAL ENCOUNTER (OUTPATIENT)
Dept: CARDIAC REHAB | Age: 82
Setting detail: THERAPIES SERIES
Discharge: HOME | End: 2017-09-21
Payer: MEDICARE

## 2017-09-21 PROCEDURE — 93798 PHYS/QHP OP CAR RHAB W/ECG: CPT

## 2017-09-25 ENCOUNTER — HOSPITAL ENCOUNTER (OUTPATIENT)
Dept: CARDIAC REHAB | Age: 82
Setting detail: THERAPIES SERIES
Discharge: HOME | End: 2017-09-25
Payer: MEDICARE

## 2017-09-25 PROCEDURE — 93798 PHYS/QHP OP CAR RHAB W/ECG: CPT

## 2017-09-29 ENCOUNTER — HOSPITAL ENCOUNTER (OUTPATIENT)
Dept: CARDIAC REHAB | Age: 82
Setting detail: THERAPIES SERIES
Discharge: HOME | End: 2017-09-29
Payer: MEDICARE

## 2017-09-29 PROCEDURE — 93798 PHYS/QHP OP CAR RHAB W/ECG: CPT

## 2017-10-02 ENCOUNTER — HOSPITAL ENCOUNTER (OUTPATIENT)
Dept: CARDIAC REHAB | Age: 82
Setting detail: THERAPIES SERIES
Discharge: HOME | End: 2017-10-02
Payer: MEDICARE

## 2017-10-02 PROCEDURE — 93798 PHYS/QHP OP CAR RHAB W/ECG: CPT

## 2017-10-05 ENCOUNTER — HOSPITAL ENCOUNTER (OUTPATIENT)
Dept: CARDIAC REHAB | Age: 82
Setting detail: THERAPIES SERIES
Discharge: HOME | End: 2017-10-05
Payer: MEDICARE

## 2017-10-05 PROCEDURE — 93798 PHYS/QHP OP CAR RHAB W/ECG: CPT

## 2017-10-12 ENCOUNTER — HOSPITAL ENCOUNTER (OUTPATIENT)
Dept: CARDIAC REHAB | Age: 82
Setting detail: THERAPIES SERIES
Discharge: HOME | End: 2017-10-12
Payer: MEDICARE

## 2017-10-16 ENCOUNTER — HOSPITAL ENCOUNTER (OUTPATIENT)
Dept: CARDIAC REHAB | Age: 82
Setting detail: THERAPIES SERIES
Discharge: HOME | End: 2017-10-16
Payer: MEDICARE

## 2017-10-16 PROCEDURE — 93798 PHYS/QHP OP CAR RHAB W/ECG: CPT

## 2017-10-19 ENCOUNTER — HOSPITAL ENCOUNTER (OUTPATIENT)
Dept: CARDIAC REHAB | Age: 82
Setting detail: THERAPIES SERIES
Discharge: HOME | End: 2017-10-19
Payer: MEDICARE

## 2017-10-19 PROCEDURE — 93798 PHYS/QHP OP CAR RHAB W/ECG: CPT

## 2017-10-23 ENCOUNTER — HOSPITAL ENCOUNTER (OUTPATIENT)
Dept: CARDIAC REHAB | Age: 82
Setting detail: THERAPIES SERIES
Discharge: HOME | End: 2017-10-23
Payer: MEDICARE

## 2017-10-23 PROCEDURE — 93798 PHYS/QHP OP CAR RHAB W/ECG: CPT

## 2017-10-30 ENCOUNTER — HOSPITAL ENCOUNTER (OUTPATIENT)
Dept: CARDIAC REHAB | Age: 82
Setting detail: THERAPIES SERIES
Discharge: HOME | End: 2017-10-30
Payer: MEDICARE

## 2017-10-30 PROCEDURE — 93798 PHYS/QHP OP CAR RHAB W/ECG: CPT

## 2017-11-27 ENCOUNTER — HOSPITAL ENCOUNTER (OUTPATIENT)
Dept: CARDIAC REHAB | Age: 82
Setting detail: THERAPIES SERIES
Discharge: HOME | End: 2017-11-27
Payer: MEDICARE

## 2017-11-27 PROCEDURE — 93798 PHYS/QHP OP CAR RHAB W/ECG: CPT

## 2018-05-30 ENCOUNTER — HOSPITAL ENCOUNTER (OUTPATIENT)
Dept: PHYSICAL THERAPY | Age: 83
Setting detail: THERAPIES SERIES
Discharge: HOME | End: 2018-05-30
Payer: MEDICARE

## 2018-05-30 PROCEDURE — 97162 PT EVAL MOD COMPLEX 30 MIN: CPT

## 2018-05-30 PROCEDURE — G8978 MOBILITY CURRENT STATUS: HCPCS

## 2018-05-30 PROCEDURE — G8979 MOBILITY GOAL STATUS: HCPCS

## 2018-06-05 ENCOUNTER — HOSPITAL ENCOUNTER (OUTPATIENT)
Dept: PHYSICAL THERAPY | Age: 83
Setting detail: THERAPIES SERIES
Discharge: HOME | End: 2018-06-05
Payer: MEDICARE

## 2018-06-05 PROCEDURE — 97535 SELF CARE MNGMENT TRAINING: CPT

## 2018-06-05 PROCEDURE — 97112 NEUROMUSCULAR REEDUCATION: CPT

## 2018-06-05 PROCEDURE — 97110 THERAPEUTIC EXERCISES: CPT

## 2018-06-05 ASSESSMENT — PAIN DESCRIPTION - ORIENTATION: ORIENTATION: LOWER

## 2018-06-05 ASSESSMENT — PAIN SCALES - GENERAL: PAINLEVEL_OUTOF10: 3

## 2018-06-05 ASSESSMENT — PAIN DESCRIPTION - LOCATION: LOCATION: BACK

## 2018-06-05 ASSESSMENT — PAIN DESCRIPTION - DESCRIPTORS: DESCRIPTORS: DULL

## 2018-06-08 ENCOUNTER — HOSPITAL ENCOUNTER (OUTPATIENT)
Dept: PHYSICAL THERAPY | Age: 83
Setting detail: THERAPIES SERIES
Discharge: HOME | End: 2018-06-08
Payer: MEDICARE

## 2018-06-08 PROCEDURE — 97110 THERAPEUTIC EXERCISES: CPT

## 2018-06-08 PROCEDURE — 97112 NEUROMUSCULAR REEDUCATION: CPT

## 2018-06-08 ASSESSMENT — PAIN DESCRIPTION - LOCATION: LOCATION: BACK

## 2018-06-08 ASSESSMENT — PAIN DESCRIPTION - ORIENTATION: ORIENTATION: RIGHT;LOWER

## 2018-06-08 ASSESSMENT — PAIN DESCRIPTION - DESCRIPTORS: DESCRIPTORS: DULL

## 2018-06-08 ASSESSMENT — PAIN SCALES - GENERAL: PAINLEVEL_OUTOF10: 5

## 2018-06-11 ENCOUNTER — HOSPITAL ENCOUNTER (OUTPATIENT)
Dept: PHYSICAL THERAPY | Age: 83
Setting detail: THERAPIES SERIES
Discharge: HOME | End: 2018-06-11
Payer: MEDICARE

## 2018-06-11 PROCEDURE — 97110 THERAPEUTIC EXERCISES: CPT

## 2018-06-11 PROCEDURE — 97112 NEUROMUSCULAR REEDUCATION: CPT

## 2018-06-11 ASSESSMENT — PAIN DESCRIPTION - ORIENTATION: ORIENTATION: RIGHT;LOWER

## 2018-06-11 ASSESSMENT — PAIN SCALES - GENERAL: PAINLEVEL_OUTOF10: 5

## 2018-06-11 ASSESSMENT — PAIN DESCRIPTION - LOCATION: LOCATION: BACK

## 2018-06-14 ENCOUNTER — HOSPITAL ENCOUNTER (OUTPATIENT)
Dept: PHYSICAL THERAPY | Age: 83
Setting detail: THERAPIES SERIES
Discharge: HOME | End: 2018-06-14
Payer: MEDICARE

## 2018-06-14 PROCEDURE — 97535 SELF CARE MNGMENT TRAINING: CPT

## 2018-06-14 PROCEDURE — 97110 THERAPEUTIC EXERCISES: CPT

## 2018-06-14 PROCEDURE — 97112 NEUROMUSCULAR REEDUCATION: CPT

## 2018-06-14 ASSESSMENT — PAIN DESCRIPTION - DESCRIPTORS: DESCRIPTORS: SHARP

## 2018-06-14 ASSESSMENT — PAIN DESCRIPTION - LOCATION: LOCATION: BACK

## 2018-06-14 ASSESSMENT — PAIN DESCRIPTION - ORIENTATION: ORIENTATION: RIGHT;LOWER

## 2018-06-14 ASSESSMENT — PAIN SCALES - GENERAL: PAINLEVEL_OUTOF10: 3

## 2018-06-18 ENCOUNTER — HOSPITAL ENCOUNTER (OUTPATIENT)
Dept: PHYSICAL THERAPY | Age: 83
Setting detail: THERAPIES SERIES
Discharge: HOME | End: 2018-06-18
Payer: MEDICARE

## 2018-06-18 PROCEDURE — 97110 THERAPEUTIC EXERCISES: CPT

## 2018-06-18 PROCEDURE — 97112 NEUROMUSCULAR REEDUCATION: CPT

## 2018-06-18 ASSESSMENT — PAIN DESCRIPTION - ORIENTATION: ORIENTATION: LOWER

## 2018-06-18 ASSESSMENT — PAIN SCALES - GENERAL: PAINLEVEL_OUTOF10: 3

## 2018-06-18 ASSESSMENT — PAIN DESCRIPTION - PAIN TYPE: TYPE: CHRONIC PAIN

## 2018-06-18 ASSESSMENT — PAIN DESCRIPTION - DESCRIPTORS: DESCRIPTORS: ACHING

## 2018-06-18 ASSESSMENT — PAIN DESCRIPTION - LOCATION: LOCATION: BACK

## 2018-06-21 ENCOUNTER — HOSPITAL ENCOUNTER (OUTPATIENT)
Dept: PHYSICAL THERAPY | Age: 83
Setting detail: THERAPIES SERIES
Discharge: HOME | End: 2018-06-21
Payer: MEDICARE

## 2018-06-21 PROCEDURE — 97110 THERAPEUTIC EXERCISES: CPT

## 2018-06-21 PROCEDURE — 97112 NEUROMUSCULAR REEDUCATION: CPT

## 2018-06-21 PROCEDURE — 97535 SELF CARE MNGMENT TRAINING: CPT

## 2018-06-25 ENCOUNTER — HOSPITAL ENCOUNTER (OUTPATIENT)
Dept: PHYSICAL THERAPY | Age: 83
Setting detail: THERAPIES SERIES
Discharge: HOME | End: 2018-06-25
Payer: MEDICARE

## 2018-06-25 PROCEDURE — 97112 NEUROMUSCULAR REEDUCATION: CPT

## 2018-06-25 PROCEDURE — 97535 SELF CARE MNGMENT TRAINING: CPT

## 2018-06-25 ASSESSMENT — PAIN DESCRIPTION - ORIENTATION: ORIENTATION: LOWER;RIGHT

## 2018-06-25 ASSESSMENT — PAIN DESCRIPTION - PAIN TYPE: TYPE: CHRONIC PAIN

## 2018-06-25 ASSESSMENT — PAIN SCALES - GENERAL: PAINLEVEL_OUTOF10: 3

## 2018-06-25 ASSESSMENT — PAIN DESCRIPTION - LOCATION: LOCATION: BACK

## 2018-06-25 ASSESSMENT — PAIN DESCRIPTION - DESCRIPTORS: DESCRIPTORS: ACHING

## 2018-06-28 ENCOUNTER — HOSPITAL ENCOUNTER (OUTPATIENT)
Dept: PHYSICAL THERAPY | Age: 83
Setting detail: THERAPIES SERIES
Discharge: HOME | End: 2018-06-28
Payer: MEDICARE

## 2018-06-28 PROCEDURE — 97110 THERAPEUTIC EXERCISES: CPT

## 2018-06-28 PROCEDURE — 97112 NEUROMUSCULAR REEDUCATION: CPT

## 2018-06-28 ASSESSMENT — PAIN DESCRIPTION - PAIN TYPE: TYPE: CHRONIC PAIN

## 2018-06-28 ASSESSMENT — PAIN DESCRIPTION - LOCATION: LOCATION: BACK

## 2018-06-28 ASSESSMENT — PAIN DESCRIPTION - ORIENTATION: ORIENTATION: LOWER;RIGHT

## 2018-06-28 ASSESSMENT — PAIN DESCRIPTION - DESCRIPTORS: DESCRIPTORS: ACHING

## 2018-06-28 ASSESSMENT — PAIN SCALES - GENERAL: PAINLEVEL_OUTOF10: 3

## 2018-07-03 ENCOUNTER — HOSPITAL ENCOUNTER (OUTPATIENT)
Dept: PHYSICAL THERAPY | Age: 83
Setting detail: THERAPIES SERIES
Discharge: HOME | End: 2018-07-03
Payer: MEDICARE

## 2018-07-03 PROCEDURE — 97110 THERAPEUTIC EXERCISES: CPT

## 2018-07-03 PROCEDURE — 97112 NEUROMUSCULAR REEDUCATION: CPT

## 2018-07-03 PROCEDURE — 97116 GAIT TRAINING THERAPY: CPT

## 2018-07-03 NOTE — PROGRESS NOTES
[address] [facility], [address]     Ph: [phone]  Fax: [phone]    [] Certification  [] Recertification []  Plan of Care  [x] Progress Note [] Discharge        To:  Referring Practitioner: Dr. Yvon Hull      From: Derrek Jaylanjennifer PT, DPT   Patient: Jim Maldonado     : 10/6/1930  Diagnosis: Muscle weakness, Myopathy legs, Neuropathy     Date: 7/3/2018  Treatment Diagnosis: Muscle weakness and Abnormality of gait due to Parkinson's    Plan of Care/Certification Expiration Date: 18  Progress Report Period from:  18  to 7/3/2018    Total # of Visits to Date: 10   No Show: 0    Canceled Appointment: 0     OBJECTIVE:   Long Term Goals - Time Frame for Long term goals : 4-5 weeks  Goals Current/ Discharge status Met   Long term goal 1: Improve cecilia LE strength to >/= 4+/5 to improve stability with standing and walking. Strength RLE  Comment: Hip flex 4+/5,  knee flex 5/5, DF 5/5, Hip abd 4/5, hip 3+/5  Strength LLE  Comment: Hip flex 5/5, knee flex 4+/5, DF 4+/5, Hip abd 4/5, hip 3+/5 [x] yes  [x] no   Long term goal 2: Pt will ambulate with LRD with improved posture and decreased deviations by >/= 50% 200' S/I. Ambulation 1  Surface: carpet  Device: Single point cane  Assistance: Independent  Quality of Gait: Significantly FWD flexed, improved cecilia foot clearance with decreased heel strike,dec'd stride length despite VC's , fair + roberto carlos   Distance: 250ft [] yes  [x] no   Long term goal 3: Crawford >/= 40/56 to reduce risk for falls. 35/56 as per   [] yes  [x] no   Long term goal 4: TUG </= 12sec with LRD. 14.75 as per  [] yes  [x] no   Long term goal 5: Pt will be able to transfer from sit to standing from various surfaces with minimal trials without UE support. Continues to require multiple trials with sit to stand without UE support.    [] yes  [x] no     Body structures, Functions, Activity limitations: Signature:__________________________________________________________  Date:  Please sign and return

## 2018-07-03 NOTE — PROGRESS NOTES
[address] [address]  Outpatient Physical Therapy    Treatment Note        Date: 7/3/2018  Patient: Deana Aguillon  : 10/6/1930  ACCT #: [de-identified]  Referring Practitioner: Dr. Olga Lidia Chavez  Diagnosis: Muscle weakness, Myopathy legs, Neuropathy    Visit Information:  PT Visit Information  PT Insurance Information: Medicare  Total # of Visits to Date: 10  Plan of Care/Certification Expiration Date: 18  No Show: 0  Canceled Appointment: 0  Progress Note Counter: 10/10    Subjective: patient reports walking seems to have improved a little bit since starting therapy. HEP Compliance:  [x] Good [] Fair [] Poor [] Reports not doing due to:    Vital Signs  Patient Currently in Pain: Denies   Pain Screening  Patient Currently in Pain: Denies    OBJECTIVE:   Exercises  Exercise 2: seated rotation , touching opp extended arm x 10  Exercise 3: Static standing: FA, FT, semitandem 2x30\"ea   Exercise 5: Gait drills: /L//march with 1 UE for support   Exercise 6: seated step tap 6' 1'x2   Exercise 12: HS str stairs, 20s x 3, Cecilia  Exercise 13:  chest pulls with YTB x 10 seated   Exercise 17: alt toe taps 4' 0-2 HHA, x 7 without ue    Assessment: Body structures, Functions, Activity limitations: Decreased functional mobility , Decreased ROM, Decreased strength, Decreased balance, Decreased endurance  Assessment: bradley 35/56, TUG 14.75 as per scores on . patient continues to fatigue quickly throughout tx. able to complete static standing balance without UE support however limited by kyphotic posture. Treatment Diagnosis: Muscle weakness and Abnormality of gait due to Parkinson's    Goals:  Long term goals  Time Frame for Long term goals : 4-5 weeks  Long term goal 1: Improve cecilia LE strength to >/= 4+/5 to improve stability with standing and walking. Long term goal 2: Pt will ambulate with LRD with improved posture and decreased deviations by >/= 50% 200' S/I.   Long term goal 3: Bradley >/= 40/56 to reduce risk for falls. Long term goal 4: TUG </= 12sec with LRD. Long term goal 5: Pt will be able to transfer from sit to standing from various surfaces with minimal trials without UE support. Progress toward goals:see PN     POST-PAIN       Pain Rating (0-10 pain scale):  0 /10   Location and pain description same as pre-treatment unless indicated. Action: [] NA   [] Perform HEP  [] Meds as prescribed  [] Modalities as prescribed   [] Call Physician     Frequency/Duration:  Plan  Times per week: 2  Plan weeks: 4-5  Current Treatment Recommendations: Strengthening, ROM, Balance Training, Functional Mobility Training, Transfer Training, Gait Training, Stair training, Neuromuscular Re-education, Manual Therapy - Soft Tissue Mobilization, Home Exercise Program, Patient/Caregiver Education & Training, Equipment Evaluation, Education, & procurement, Modalities     Pt to continue current HEP. See objective section for any therapeutic exercise changes, additions or modifications this date.     PT Individual Minutes  Time In: 0273  Time Out: 1200  Minutes: 58  Timed Code Treatment Minutes: 58 Minutes  Procedure Minutes:0    Signature:  Electronically signed by Yvrose Guerrero PTA on 7/3/18 at 4:07 PM

## 2018-07-06 ENCOUNTER — HOSPITAL ENCOUNTER (OUTPATIENT)
Dept: PHYSICAL THERAPY | Age: 83
Setting detail: THERAPIES SERIES
Discharge: HOME | End: 2018-07-06
Payer: MEDICARE

## 2018-07-06 PROCEDURE — G8979 MOBILITY GOAL STATUS: HCPCS

## 2018-07-06 PROCEDURE — 97110 THERAPEUTIC EXERCISES: CPT

## 2018-07-06 PROCEDURE — 97112 NEUROMUSCULAR REEDUCATION: CPT

## 2018-07-06 PROCEDURE — G8978 MOBILITY CURRENT STATUS: HCPCS

## 2018-07-06 ASSESSMENT — PAIN DESCRIPTION - ORIENTATION: ORIENTATION: LOWER

## 2018-07-06 ASSESSMENT — PAIN DESCRIPTION - LOCATION: LOCATION: BACK

## 2018-07-06 ASSESSMENT — PAIN SCALES - GENERAL: PAINLEVEL_OUTOF10: 5

## 2018-07-06 ASSESSMENT — PAIN DESCRIPTION - FREQUENCY: FREQUENCY: INTERMITTENT

## 2018-07-06 ASSESSMENT — PAIN DESCRIPTION - PAIN TYPE: TYPE: CHRONIC PAIN

## 2018-07-09 ENCOUNTER — HOSPITAL ENCOUNTER (OUTPATIENT)
Dept: PHYSICAL THERAPY | Age: 83
Setting detail: THERAPIES SERIES
Discharge: HOME | End: 2018-07-09
Payer: MEDICARE

## 2018-07-09 PROCEDURE — 97112 NEUROMUSCULAR REEDUCATION: CPT

## 2018-07-09 PROCEDURE — 97535 SELF CARE MNGMENT TRAINING: CPT

## 2018-07-09 PROCEDURE — 97110 THERAPEUTIC EXERCISES: CPT

## 2018-07-09 NOTE — PROGRESS NOTES
[address] [address]  Outpatient Physical Therapy    Treatment Note        Date: 2018  Patient: Bernardo Rutherford  : 10/6/1930  ACCT #: [de-identified]  Referring Practitioner: Dr. Vilma Garner  Diagnosis: Muscle weakness, Myopathy legs, Neuropathy    Visit Information:  PT Visit Information  PT Insurance Information: Medicare  Total # of Visits to Date: 15  Plan of Care/Certification Expiration Date: 18  No Show: 0  Canceled Appointment: 0  Progress Note Counter: 2/10    Subjective: Pt reports \"I feel terrible, i'M tired all the time\", I don't know how long I want to keep coming\"     HEP Compliance:  [x] Good [x] Fair [] Poor [] Reports not doing due to:    Vital Signs  Patient Currently in Pain: No   Pain Screening  Patient Currently in Pain: No    OBJECTIVE:   Exercises  Exercise 1: Posture ex: seated retraction, shrugs/rolls,standing against wall with upright posture 3x30\"  Exercise 3: tuning board 0-2 HHA , FA, wt shifts A/P/L SBA<>CGA  Exercise 4: Single stepping x 5 over QC with 1 UE support, Fwd, fatigued quickly  Exercise 5: Gait drills: /L/R/march/tandem with 1 UE for support   Exercise 7: 4 way hip, YTB x 10ea cecilia,  Exercise 8: stepping over SPC's for inc'd step length  Exercise 9: STS from chair,without ue's x 5,not fully uright  Exercise 17: alt toe taps 4' 0-2 HHA, x 7 without ue       Transfers  Comment: STS from chair x 5, cecilia ue's, increased effort and time required        *Indicates exercise, modality, or manual techniques to be initiated when appropriate    Assessment: Body structures, Functions, Activity limitations: Decreased functional mobility , Decreased ROM, Decreased strength, Decreased balance, Decreased endurance  Assessment: Pt reporting constant fatigue and expressing that he doesn't know how many more sessions he will attend. Discussed with pt and advised him it was his decision to continue . Pt required several rest breaks d/t fatigue and back pain.  Trialed back brace for extra support with standing activites with some decrease in pain per pt. Treatment Diagnosis: Muscle weakness and Abnormality of gait due to Parkinson's      Goals:     Long term goals  Time Frame for Long term goals : 4-5 weeks  Long term goal 1: Improve cecilia LE strength to >/= 4+/5 to improve stability with standing and walking. Long term goal 2: Pt will ambulate with LRD with improved posture and decreased deviations by >/= 50% 200' S/I. Long term goal 3: Crawford >/= 40/56 to reduce risk for falls. Long term goal 4: TUG </= 12sec with LRD. Long term goal 5: Pt will be able to transfer from sit to standing from various surfaces with minimal trials without UE support. Progress toward goals:balance, posture, STS,    POST-PAIN       Pain Rating (0-10 pain scale):  6-7 /10   Location and pain description same as pre-treatment unless indicated. Action: [] NA   [] Perform HEP  [x] Meds as prescribed  [] Modalities as prescribed   [] Call Physician     Frequency/Duration:  Plan  Times per week: 2  Plan weeks: 4  Current Treatment Recommendations: Strengthening, ROM, Balance Training, Functional Mobility Training, Transfer Training, Gait Training, Stair training, Neuromuscular Re-education, Manual Therapy - Soft Tissue Mobilization, Home Exercise Program, Patient/Caregiver Education & Training, Equipment Evaluation, Education, & procurement, Modalities     Pt to continue current HEP. See objective section for any therapeutic exercise changes, additions or modifications this date.          PT Individual Minutes  Time In: 8848  Time Out: 7240  Minutes: 57     Transfers x 8  Neuro x 25  TE x 24    Signature:  Electronically signed by Laila Paul PTA on 7/9/18 at 12:59 PM

## 2018-07-12 ENCOUNTER — HOSPITAL ENCOUNTER (OUTPATIENT)
Dept: PHYSICAL THERAPY | Age: 83
Setting detail: THERAPIES SERIES
Discharge: HOME | End: 2018-07-12
Payer: MEDICARE

## 2018-07-12 PROCEDURE — 97112 NEUROMUSCULAR REEDUCATION: CPT

## 2018-07-12 PROCEDURE — 97110 THERAPEUTIC EXERCISES: CPT

## 2018-07-12 ASSESSMENT — PAIN SCALES - GENERAL: PAINLEVEL_OUTOF10: 0

## 2018-07-16 ENCOUNTER — HOSPITAL ENCOUNTER (OUTPATIENT)
Dept: PHYSICAL THERAPY | Age: 83
Setting detail: THERAPIES SERIES
Discharge: HOME | End: 2018-07-16
Payer: MEDICARE

## 2018-07-18 ENCOUNTER — HOSPITAL ENCOUNTER (OUTPATIENT)
Dept: PHYSICAL THERAPY | Age: 83
Setting detail: THERAPIES SERIES
Discharge: HOME | End: 2018-07-18
Payer: MEDICARE

## 2018-07-18 PROCEDURE — 97110 THERAPEUTIC EXERCISES: CPT

## 2018-07-18 PROCEDURE — 97112 NEUROMUSCULAR REEDUCATION: CPT

## 2018-07-23 ENCOUNTER — APPOINTMENT (OUTPATIENT)
Dept: PHYSICAL THERAPY | Age: 83
End: 2018-07-23
Payer: MEDICARE

## 2018-07-25 ENCOUNTER — HOSPITAL ENCOUNTER (OUTPATIENT)
Dept: PHYSICAL THERAPY | Age: 83
Setting detail: THERAPIES SERIES
Discharge: HOME | End: 2018-07-25
Payer: MEDICARE

## 2018-07-25 PROCEDURE — 97110 THERAPEUTIC EXERCISES: CPT

## 2018-07-25 PROCEDURE — 97112 NEUROMUSCULAR REEDUCATION: CPT

## 2018-07-25 ASSESSMENT — PAIN SCALES - GENERAL: PAINLEVEL_OUTOF10: 4

## 2018-07-25 ASSESSMENT — PAIN DESCRIPTION - PAIN TYPE: TYPE: CHRONIC PAIN

## 2018-07-25 ASSESSMENT — PAIN DESCRIPTION - ORIENTATION: ORIENTATION: LOWER

## 2018-07-25 ASSESSMENT — PAIN DESCRIPTION - LOCATION: LOCATION: BACK

## 2018-07-25 ASSESSMENT — PAIN DESCRIPTION - DESCRIPTORS: DESCRIPTORS: ACHING

## 2018-07-25 NOTE — PROGRESS NOTES
[address] [address]  Outpatient Physical Therapy    Treatment Note        Date: 2018  Patient: Ramya Jason  : 10/6/1930  ACCT #: [de-identified]  Referring Practitioner: Dr. Courtney Craft  Diagnosis: Muscle weakness, Myopathy legs, Neuropathy    Visit Information:  PT Visit Information  PT Insurance Information: Medicare  Total # of Visits to Date: 13  Plan of Care/Certification Expiration Date: 18  No Show: 0  Canceled Appointment: 1  Progress Note Counter:     Subjective: Pt reports doing okay at home. HEP Compliance:  [] Good [x] Fair [] Poor [] Reports not doing due to:    Vital Signs  Patient Currently in Pain: Yes   Pain Screening  Patient Currently in Pain: Yes  Pain Assessment  Pain Level: 4  Pain Type: Chronic pain  Pain Location: Back (with walking)  Pain Orientation: Lower  Pain Descriptors: Aching    OBJECTIVE:   Exercises  Exercise 2: HS stretch at step 3/30'' Brian to improve ROM and posture  Exercise 5: Gait drills: agililty ladder with focus on increased stride length, marching through, marching through, lateral  Exercise 11: Stepping in/out of 6'' box to increase strength and foot clearance  Exercise 13:  chest pulls with YTB x 10 seated (3way) VCs to improve technique  Exercise 14: Wall stands: 27'', 39'' 1'  Exercise 15: Lats/rows in stand YTB x10  Exercise 16: Bean bag reach and throw with trunk rotation  Exercise 18: Standing trunk rotations/ double arm raises VCs to improve technique        *Indicates exercise, modality, or manual techniques to be initiated when appropriate    Assessment:   Activity Tolerance  Activity Tolerance: Patient Tolerated treatment well    Body structures, Functions, Activity limitations: Decreased functional mobility , Decreased ROM, Decreased strength, Decreased balance, Decreased endurance  Assessment: Pt with improved standing tolerance today. No significant change in TUG with use of rollator.  Initiated wall stands to improve posture, pt

## 2018-07-27 ENCOUNTER — HOSPITAL ENCOUNTER (OUTPATIENT)
Dept: PHYSICAL THERAPY | Age: 83
Setting detail: THERAPIES SERIES
Discharge: HOME | End: 2018-07-27
Payer: MEDICARE

## 2018-07-27 PROCEDURE — 97112 NEUROMUSCULAR REEDUCATION: CPT

## 2018-07-27 PROCEDURE — 97110 THERAPEUTIC EXERCISES: CPT

## 2018-07-27 PROCEDURE — 97116 GAIT TRAINING THERAPY: CPT

## 2018-07-27 NOTE — PROGRESS NOTES
[address] [address]  Outpatient Physical Therapy    Treatment Note        Date: 2018  Patient: Pratibha Ma  : 10/6/1930  ACCT #: [de-identified]  Referring Practitioner: Dr. Dona Echols  Diagnosis: Muscle weakness, Myopathy legs, Neuropathy    Visit Information:  PT Visit Information  PT Insurance Information: Medicare  Total # of Visits to Date: 12  Plan of Care/Certification Expiration Date: 18  No Show: 0  Canceled Appointment: 1  Progress Note Counter:     Subjective: patient states he feels like is walking better at home. HEP Compliance:  [x] Good [] Fair [] Poor [] Reports not doing due to:    Vital Signs  Patient Currently in Pain: Denies   Pain Screening  Patient Currently in Pain: Denies    OBJECTIVE:   Exercises  Exercise 8: single step forward bean bag toss   Exercise 14: Wall stands: 1'x2   Exercise 15: PNF chops/trunk rotation standing with ball  Exercise 18: Standing trunk rotations/ double arm raises VCs to improve technique    *Indicates exercise, modality, or manual techniques to be initiated when appropriate    Assessment: Body structures, Functions, Activity limitations: Decreased functional mobility , Decreased ROM, Decreased strength, Decreased balance, Decreased endurance  Assessment: patient with improved BARRETT score on this date. continued to focus on exercises to improve postural strength. unable to improve posture without bilateral UE support. Treatment Diagnosis: Muscle weakness and Abnormality of gait due to Parkinson's  Prognosis: Good       Goals:       Long term goals  Time Frame for Long term goals : 4-5 weeks  Long term goal 1: Improve cecilia LE strength to >/= 4+/5 to improve stability with standing and walking. Long term goal 2: Pt will ambulate with LRD with improved posture and decreased deviations by >/= 50% 200' S/I. Long term goal 3: Barrett >/= 40/56 to reduce risk for falls. Long term goal 4: TUG </= 12sec with LRD.   Long term goal 5: Pt

## 2018-08-01 ENCOUNTER — HOSPITAL ENCOUNTER (OUTPATIENT)
Dept: PHYSICAL THERAPY | Age: 83
Setting detail: THERAPIES SERIES
Discharge: HOME | End: 2018-08-01
Payer: MEDICARE

## 2018-08-01 PROCEDURE — 97112 NEUROMUSCULAR REEDUCATION: CPT

## 2018-08-01 PROCEDURE — G8978 MOBILITY CURRENT STATUS: HCPCS

## 2018-08-01 PROCEDURE — 97110 THERAPEUTIC EXERCISES: CPT

## 2018-08-01 PROCEDURE — G8979 MOBILITY GOAL STATUS: HCPCS

## 2018-08-01 NOTE — PROGRESS NOTES
[address] [address]  Outpatient Physical Therapy    Treatment Note        Date: 2018  Patient: Zeinab Deleon  : 10/6/1930  ACCT #: [de-identified]  Referring Practitioner: Dr. Srinath Kidd  Diagnosis: Muscle weakness, Myopathy legs, Neuropathy    Visit Information:  PT Visit Information  PT Insurance Information: Medicare  Total # of Visits to Date: 16  Plan of Care/Certification Expiration Date: 18  No Show: 0  Canceled Appointment: 1  Progress Note Counter:     Subjective: pt with  appointment Aug 27th. Pt states that he feels like his walking and strenght is improving. Comments: Wife reports that he has been depressed over the past week. HEP Compliance:  [x] Good [] Fair [] Poor [] Reports not doing due to:    Vital Signs  Patient Currently in Pain: Denies   Pain Screening  Patient Currently in Pain: Denies    OBJECTIVE:   Exercises  Exercise 4: Single stepping x 10 over SC with intermittent 1 UE support, Fwd,   Exercise 5: Gait drills:focus on increased stride length; marching/lateral  Exercise 6: step ups onto 4\" step x10 B'ly with 1UE support  Exercise 8: single step forward bean bag toss   Exercise 14: Wall stands: 1'x2   Exercise 16: Bean bag reach and throw with trunk rotation without UE support            Ambulation 1  Surface: carpet  Device: Rolling Walker  Assistance: Independent  Quality of Gait: Forward flexed posture with v/c to upright posture, with minimal follow through   Distance: unlimited within department. Transfers  Sit to Stand: Independent  Stand to sit: Independent  Comment: requires bilateral UEs to complete, but decreased effort. Unable to transfer from chair with 1 UE or without UEs.         Strength: [] NT  [] MMT completed:  Strength RLE  Comment: hip abd 4+/5, hip flex 4-5, DF 4/5   Strength LLE  Comment: hip abd 4+/5, hip flex 4/5, DF 4+/5          Assessment:   Activity Tolerance  Activity Tolerance: Patient Tolerated treatment well, Patient changes, additions or modifications this date.          PT Individual Minutes  Time In: 1966  Time Out: 1266  Minutes: 55  Timed Code Treatment Minutes: 60 Minutes  Procedure Minutes:    Signature:  Electronically signed by Gracie Gonzales PTA on 8/1/18 at 12:02 PM

## 2018-08-03 ENCOUNTER — HOSPITAL ENCOUNTER (OUTPATIENT)
Dept: PHYSICAL THERAPY | Age: 83
Setting detail: THERAPIES SERIES
Discharge: HOME | End: 2018-08-03
Payer: MEDICARE

## 2018-08-03 PROCEDURE — 97112 NEUROMUSCULAR REEDUCATION: CPT

## 2018-08-03 PROCEDURE — 97535 SELF CARE MNGMENT TRAINING: CPT

## 2018-08-03 PROCEDURE — 97110 THERAPEUTIC EXERCISES: CPT

## 2018-08-03 NOTE — PROGRESS NOTES
structures, Functions, Activity limitations: Decreased functional mobility , Decreased ROM, Decreased strength, Decreased balance, Decreased endurance  Assessment: Pt demo's improving Tug score without AD, however continued FF posture, no lob without AD. Pt attempting to  speed with stepping drills, losing balance lateral with CGA to correct. Pt would like to continue with tx and continue to work on progressing balance and strength. Treatment Diagnosis: Muscle weakness and Abnormality of gait due to Parkinson's          Goals:       Long term goals  Time Frame for Long term goals : 4-5 weeks  Long term goal 1: Improve cecilia LE strength to >/= 4+/5 to improve stability with standing and walking. Long term goal 2: Pt will ambulate with LRD with improved posture and decreased deviations by >/= 50% 200' S/I. Long term goal 3: Crawford >/= 40/56 to reduce risk for falls. Long term goal 4: TUG </= 12sec with LRD. Long term goal 5: Pt will be able to transfer from sit to standing from various surfaces with minimal trials without UE support. Progress toward goals:LTG, 1,2,4,5    POST-PAIN       Pain Rating (0-10 pain scale):  4 /10   Location and pain description same as pre-treatment unless indicated. Action: [] NA   [] Perform HEP  [x] Meds as prescribed  [] Modalities as prescribed   [] Call Physician     Frequency/Duration:  Plan  Times per week: 1  Plan weeks: 3-4  Current Treatment Recommendations: Strengthening, ROM, Balance Training, Functional Mobility Training, Transfer Training, Gait Training, Stair training, Neuromuscular Re-education, Manual Therapy - Soft Tissue Mobilization, Home Exercise Program, Patient/Caregiver Education & Training, Equipment Evaluation, Education, & procurement, Modalities     Pt to continue current HEP. See objective section for any therapeutic exercise changes, additions or modifications this date.          PT Individual Minutes  Time In: 3490  Time Out: [address]  Minutes: 53 TE X 10  Gait x 5  Transfers x 8  Neuro x 30  Signature:  Electronically signed by Enid Louis PTA on 8/3/18 at 1:05 PM

## 2018-08-03 NOTE — PROGRESS NOTES
without UE support. Transfers  Comment: STS from rollator without ue's, from chair with inc'd effort and 1 ue  Ambulation 1  Surface: carpet  Device: Rollator  Assistance: Independent  Quality of Gait: FF posture with improvement for upright, inc'd step length and Heel strike, occasssional shuffle step  Distance: unlimited within department. [] yes  [x] no       [] yes  [] no       [] yes  [] no        Body structures, Functions, Activity limitations: Decreased functional mobility , Decreased ROM, Decreased strength, Decreased balance, Decreased endurance  Assessment: Pt demo's improving Tug score without AD, however continued FF posture, no lob without AD. Pt attempting to  speed with stepping drills, losing balance lateral with CGA to correct. Pt would like to continue with tx and continue to work on progressing balance and strength. Discharge Recommendations: Continue to assess pending progress      New Education Provided:    G-Codes       PLAN: [] Evaluate and Treat  Frequency/Duration:  Plan  Times per week: 1  Plan weeks: 3-4  Current Treatment Recommendations: Strengthening, ROM, Balance Training, Functional Mobility Training, Transfer Training, Gait Training, Stair training, Neuromuscular Re-education, Manual Therapy - Soft Tissue Mobilization, Home Exercise Program, Patient/Caregiver Education & Training, Equipment Evaluation, Education, & procurement, Modalities     Precautions:             ?     Patient Status:[x] Continue/ Initiate plan of Care    [] Discharge PT. Recommend pt continue with HEP. [] Additional visits requested, Please re-certify for additional visits:          Signature:Obj info by: Electronically signed by Aniyah Ferrari PTA on 8/3/18 at 11:51 AM      If you have any questions or concerns, please don't hesitate to call. Thank you for your referral.    I have reviewed this plan of care and certify a need for medically necessary rehabilitation services.     Physician

## 2018-08-09 ENCOUNTER — HOSPITAL ENCOUNTER (OUTPATIENT)
Dept: PHYSICAL THERAPY | Age: 83
Setting detail: THERAPIES SERIES
Discharge: HOME | End: 2018-08-09
Payer: MEDICARE

## 2018-08-09 PROCEDURE — 97112 NEUROMUSCULAR REEDUCATION: CPT

## 2018-08-09 PROCEDURE — 97110 THERAPEUTIC EXERCISES: CPT

## 2018-08-09 PROCEDURE — 97116 GAIT TRAINING THERAPY: CPT

## 2018-08-09 NOTE — PROGRESS NOTES
[address] [address]  Outpatient Physical Therapy    Treatment Note        Date: 2018  Patient: Sejal Martino  : 10/6/1930  ACCT #: [de-identified]  Referring Practitioner: Dr. Andrew Crews  Diagnosis: Muscle weakness, Myopathy legs, Neuropathy    Visit Information:  PT Visit Information  PT Insurance Information: Medicare  Total # of Visits to Date: 23  Plan of Care/Certification Expiration Date: 18  No Show: 0  Canceled Appointment: 1  Progress Note Counter:     Subjective: Pt's wife states that she is going talk to the doctor about a new back brace, old brace isn't comfortable and hasn't worn in over 1 year. HEP Compliance:  [] Good [x] Fair [] Poor [] Reports not doing due to:    Vital Signs  Patient Currently in Pain: Denies   Pain Screening  Patient Currently in Pain: Denies    OBJECTIVE:   Exercises  Exercise 1: Posture ex: seated retraction, shrugs/rolls x10  Exercise 2: HS stretch at step 3/30'' Brian to improve ROM and posture  Exercise 3: Static stand : FA/FT, EO/EC  Exercise 4: Single stepping x 10 over bolster with intermittent 1 UE support, Fwd,   Exercise 5: Gait drills:focus on increased stride length; marching/latera/ ret  Exercise 6: step ups onto 4\" step x10 B'ly with 1UE support  Exercise 14: Wall stands: 1'x2            Assessment: Body structures, Functions, Activity limitations: Decreased functional mobility , Decreased ROM, Decreased strength, Decreased balance, Decreased endurance  Assessment: Pt shows an increase in fatigue this session requiring frequent rest breaks, increased UE support, and with declining posture. Single stepping progressed from a cane to a bolster with difficulty clearing foot.     Treatment Diagnosis: Muscle weakness and Abnormality of gait due to Parkinson's          Goals:       Long term goals  Time Frame for Long term goals : 4-5 weeks  Long term goal 1: Improve brian LE strength to >/= 4+/5 to improve stability with standing and

## 2018-08-17 ENCOUNTER — HOSPITAL ENCOUNTER (OUTPATIENT)
Dept: PHYSICAL THERAPY | Age: 83
Setting detail: THERAPIES SERIES
Discharge: HOME | End: 2018-08-17
Payer: MEDICARE

## 2018-08-17 PROCEDURE — 97116 GAIT TRAINING THERAPY: CPT

## 2018-08-17 PROCEDURE — 97112 NEUROMUSCULAR REEDUCATION: CPT

## 2018-08-17 NOTE — PROGRESS NOTES
[address] [address]  Outpatient Physical Therapy    Treatment Note        Date: 2018  Patient: Liyah Tyler  : 10/6/1930  ACCT #: [de-identified]  Referring Practitioner: Dr. Ruy Snaderson  Diagnosis: Muscle weakness, Myopathy legs, Neuropathy    Visit Information:  PT Visit Information  PT Insurance Information: Medicare  Total # of Visits to Date: 21  Plan of Care/Certification Expiration Date: 18  No Show: 0  Canceled Appointment: 1  Progress Note Counter: 3/4    Subjective: patient reports he has follow up with neurologist on 18. reports he is most limited by ability to stand for long periods of time. HEP Compliance:  [x] Good [] Fair [] Poor [] Reports not doing due to:    Vital Signs  Patient Currently in Pain: Denies   Pain Screening  Patient Currently in Pain: Denies    OBJECTIVE:   Exercises  Exercise 5: Gait drills:focus on increased stride length; marching/latera/ ret  Exercise 13: sit to stand from various surfaces throughout clinic- able to complete x4 without UE from 17 inches   Exercise 14: Delorise Blend stands: 1'x2   Exercise 15: PNF chops/trunk rotation standing with ball with 1 foot on 4\" step   Ambulation 1  Surface: carpet  Device: Rollator  Assistance: Independent  Quality of Gait: verbal cues to keep body closer to rollator- to improve posture, NBOS, decreased roberto carlos and foot clearance when fatigued. Distance:  1 lap arounf track, unlimited distances around therapy gym    Assessment: Body structures, Functions, Activity limitations: Decreased functional mobility , Decreased ROM, Decreased strength, Decreased balance, Decreased endurance  Assessment: patient able to complete sit to stand x4 without UE from 17 inch surface. focused on improving postural awareness during gait training. patient able to complete all task without LOB.    Treatment Diagnosis: Muscle weakness and Abnormality of gait due to Parkinson's  Goals:  Long term goals  Time Frame for Long term goals :

## 2018-08-24 ENCOUNTER — HOSPITAL ENCOUNTER (OUTPATIENT)
Dept: PHYSICAL THERAPY | Age: 83
Setting detail: THERAPIES SERIES
Discharge: HOME | End: 2018-08-24
Payer: MEDICARE

## 2018-08-31 ENCOUNTER — HOSPITAL ENCOUNTER (OUTPATIENT)
Dept: PHYSICAL THERAPY | Age: 83
Setting detail: THERAPIES SERIES
Discharge: HOME | End: 2018-08-31
Payer: MEDICARE

## 2018-08-31 PROCEDURE — 97112 NEUROMUSCULAR REEDUCATION: CPT

## 2018-08-31 PROCEDURE — 97116 GAIT TRAINING THERAPY: CPT

## 2018-08-31 PROCEDURE — G8979 MOBILITY GOAL STATUS: HCPCS

## 2018-08-31 PROCEDURE — G8980 MOBILITY D/C STATUS: HCPCS

## 2018-08-31 NOTE — PROGRESS NOTES
class pkt. for future inquiry after current class ends at [facility]. Pt d/c'd this visit. Treatment Diagnosis: Muscle weakness and Abnormality of gait due to Parkinson's          Goals:       Long term goals  Time Frame for Long term goals : 4-5 weeks  Long term goal 1: Improve cecilia LE strength to >/= 4+/5 to improve stability with standing and walking. Long term goal 2: Pt will ambulate with LRD with improved posture and decreased deviations by >/= 50% 200' S/I. Long term goal 3: Crawford >/= 40/56 to reduce risk for falls. Long term goal 4: TUG </= 12sec with LRD. Long term goal 5: Pt will be able to transfer from sit to standing from various surfaces with minimal trials without UE support. Progress toward goals:gait, strength, balance    POST-PAIN       Pain Rating (0-10 pain scale):  5 /10   Location and pain description same as pre-treatment unless indicated. Action: [] NA   [x] Perform HEP  [x] Meds as prescribed  [x] Modalities as prescribed   [] Call Physician     Frequency/Duration:  Plan  Times per week: 1  Plan weeks: 3-4  Current Treatment Recommendations: Strengthening, ROM, Balance Training, Functional Mobility Training, Transfer Training, Gait Training, Stair training, Neuromuscular Re-education, Manual Therapy - Soft Tissue Mobilization, Home Exercise Program, Patient/Caregiver Education & Training, Equipment Evaluation, Education, & procurement, Modalities     Pt to continue current HEP. See objective section for any therapeutic exercise changes, additions or modifications this date.          PT Individual Minutes  Time In: 7226  Time Out: 1200  Minutes: 62      Signature:  Electronically signed by Earnest Wong PTA on 18 at 4:35 PM      Total Minutes:58      Transfer/Bed mobility trainin      Gait trainin      Neuro re education:45     Therapeutic ex:

## 2018-08-31 NOTE — DISCHARGE SUMMARY
Decreased ROM, Decreased strength, Decreased balance, Decreased endurance  Assessment: Pt demo's slow improvement  with Crawford balance score, up 2 points from 1 month ago. Pt demo's improved strength in LE's. Pt and spouse given specialized fitness class packet for future inquiry after current class ends at [facility]. Pt has plateaued in PT at this time and will be D/C'd to HEP. G-Codes  PT G-Codes  Functional Assessment Tool Used: Crawford and clinical judgement  Score: 45/56  Functional Limitation: Mobility: Walking and moving around  Mobility: Walking and Moving Around Goal Status (): At least 1 percent but less than 20 percent impaired, limited or restricted  Mobility: Walking and Moving Around Discharge Status (): At least 20 percent but less than 40 percent impaired, limited or restricted    PLAN: [] Evaluate and Treat  Frequency/Duration:  D/C to HEP     Patient Status:[] Continue/ Initiate plan of Care    [x] Discharge PT. Recommend pt continue with HEP. [] Additional visits requested, Please re-certify for additional visits:          Signature:Objective measures by; Electronically signed by Deondre Alvarez PTA on 8/31/18 at 4:38 PM  Electronically signed by Martin Osborn PT on 9/4/2018 at 2:05 PM    If you have any questions or concerns, please don't hesitate to call. Thank you for your referral.    I have reviewed this plan of care and certify a need for medically necessary rehabilitation services.     Physician Signature:__________________________________________________________  Date:  Please sign and return

## 2018-09-20 ENCOUNTER — HOSPITAL ENCOUNTER (OUTPATIENT)
Dept: PHYSICAL THERAPY | Age: 83
Setting detail: THERAPIES SERIES
Discharge: HOME | End: 2018-09-20
Payer: MEDICARE

## 2018-09-23 ENCOUNTER — HOSPITAL ENCOUNTER (EMERGENCY)
Age: 83
Discharge: HOME | End: 2018-09-23
Payer: MEDICARE

## 2018-09-23 VITALS
TEMPERATURE: 98 F | OXYGEN SATURATION: 98 % | HEART RATE: 68 BPM | RESPIRATION RATE: 20 BRPM | HEIGHT: 68 IN | BODY MASS INDEX: 24.25 KG/M2 | DIASTOLIC BLOOD PRESSURE: 72 MMHG | SYSTOLIC BLOOD PRESSURE: 190 MMHG | WEIGHT: 160 LBS

## 2018-09-23 DIAGNOSIS — H60.501 ACUTE OTITIS EXTERNA OF RIGHT EAR, UNSPECIFIED TYPE: Primary | ICD-10-CM

## 2018-09-23 PROCEDURE — 6370000000 HC RX 637 (ALT 250 FOR IP): Performed by: PERSONAL EMERGENCY RESPONSE ATTENDANT

## 2018-09-23 PROCEDURE — 99283 EMERGENCY DEPT VISIT LOW MDM: CPT

## 2018-09-23 RX ORDER — HYDROCODONE BITARTRATE AND ACETAMINOPHEN 5; 325 MG/1; MG/1
1 TABLET ORAL ONCE
Status: COMPLETED | OUTPATIENT
Start: 2018-09-23 | End: 2018-09-23

## 2018-09-23 RX ORDER — CLOPIDOGREL BISULFATE 75 MG/1
75 TABLET ORAL DAILY
COMMUNITY

## 2018-09-23 RX ORDER — LATANOPROST 50 UG/ML
1 SOLUTION/ DROPS OPHTHALMIC NIGHTLY
COMMUNITY

## 2018-09-23 RX ORDER — CIPROFLOXACIN AND DEXAMETHASONE 3; 1 MG/ML; MG/ML
4 SUSPENSION/ DROPS AURICULAR (OTIC) ONCE
Status: COMPLETED | OUTPATIENT
Start: 2018-09-23 | End: 2018-09-23

## 2018-09-23 RX ORDER — HYDROCODONE BITARTRATE AND ACETAMINOPHEN 5; 325 MG/1; MG/1
1-2 TABLET ORAL EVERY 6 HOURS PRN
Qty: 5 TABLET | Refills: 0 | Status: SHIPPED | OUTPATIENT
Start: 2018-09-23 | End: 2020-03-13 | Stop reason: HOSPADM

## 2018-09-23 RX ORDER — PANTOPRAZOLE SODIUM 40 MG/1
40 GRANULE, DELAYED RELEASE ORAL
COMMUNITY

## 2018-09-23 RX ADMIN — HYDROCODONE BITARTRATE AND ACETAMINOPHEN 1 TABLET: 5; 325 TABLET ORAL at 05:13

## 2018-09-23 RX ADMIN — CIPROFLOXACIN AND DEXAMETHASONE 4 DROP: 3; 1 SUSPENSION/ DROPS AURICULAR (OTIC) at 05:12

## 2018-09-23 ASSESSMENT — ENCOUNTER SYMPTOMS
SORE THROAT: 0
ABDOMINAL PAIN: 0
COLOR CHANGE: 0
VOMITING: 0
DIARRHEA: 0
COUGH: 0
BLOOD IN STOOL: 0
SHORTNESS OF BREATH: 0
RHINORRHEA: 0
NAUSEA: 0

## 2018-09-23 ASSESSMENT — PAIN SCALES - GENERAL: PAINLEVEL_OUTOF10: 6

## 2018-09-23 ASSESSMENT — PAIN DESCRIPTION - LOCATION: LOCATION: EAR

## 2018-09-23 ASSESSMENT — PAIN DESCRIPTION - DESCRIPTORS: DESCRIPTORS: SHARP

## 2018-09-23 ASSESSMENT — PAIN DESCRIPTION - ORIENTATION: ORIENTATION: RIGHT

## 2018-09-23 ASSESSMENT — PAIN DESCRIPTION - PAIN TYPE: TYPE: ACUTE PAIN

## 2018-09-23 NOTE — ED TRIAGE NOTES
Patient c/o right ear pain that started yesterday, last night he felt like there was fluid inside, denies fever, he rates the pain 6/10, his wife said he hasn't slept at all since it started.

## 2018-09-23 NOTE — ED PROVIDER NOTES
[address] [facility] ED  eMERGENCY dEPARTMENT eNCOUnter      Pt Name: Dylan Gamez  MRN: [id number]  [place] 10/6/1930  Date of evaluation: 9/23/2018  Provider: Berto Arias, [address]    Dylan Gamez is a 80 y.o. male presents to the emergency department with right ear pain. Pt states yesterday he started with right ear pain. It is causing a right sided headache. He denies fevers, URI, changes in his vision. He does wear hearing aids and does occasionally get ear infections. There has been no trauma. They were unable to get into his ENT over the weekend. Patient is having a hard time sleeping d/t pain. HPI    Nursing Notes were reviewed. REVIEW OF SYSTEMS       Review of Systems   Constitutional: Negative for appetite change, chills and fever. HENT: Positive for ear pain. Negative for congestion, rhinorrhea and sore throat. Respiratory: Negative for cough and shortness of breath. Cardiovascular: Negative for chest pain. Gastrointestinal: Negative for abdominal pain, blood in stool, diarrhea, nausea and vomiting. Genitourinary: Negative for difficulty urinating. Musculoskeletal: Negative for neck stiffness. Skin: Negative for color change and rash. Neurological: Negative for dizziness, syncope, weakness, light-headedness, numbness and headaches. All other systems reviewed and are negative.             PAST MEDICAL HISTORY     Past Medical History:   Diagnosis Date    Cancer [facility]) 1996    Prostate    Chronic back pain     History of blood clots 02/2010    Right leg    History of kidney stones 2004    Hyperlipidemia     Low back pain with sciatica     Parkinson disease ([facility] [address]     Type 2 diabetes mellitus without retinopathy ([facility] [address] 9/23/2015    Vertigo          SURGICAL HISTORY       Past Surgical History:   Procedure Laterality Date    APPENDECTOMY      CORONARY ANGIOPLASTY WITH STENT PLACEMENT      EYE SURGERY      EYE SURGERY  2009    Left lower Psychiatric: He has a normal mood and affect. His behavior is normal. Judgment and thought content normal.       DIAGNOSTIC RESULTS     EKG: All EKG's are interpreted by the Emergency Department Physician who either signs or Co-signs this chart in the absence of a cardiologist.        RADIOLOGY:   Non-plain film images such as CT, Ultrasound and MRI are read by the radiologist. Plain radiographic images are visualized and preliminarily interpreted by the emergency physician with the below findings:    Interpretation per the Radiologist below, if available at the time of this note:    No orders to display           LABS:  Labs Reviewed - No data to display    All other labs were within normal range or not returned as of this dictation. EMERGENCY DEPARTMENT COURSE and DIFFERENTIAL DIAGNOSIS/MDM:   Vitals:    Vitals:    09/23/18 0411   BP: (!) 221/85   Pulse: 65   Resp: 18   Temp: 98 °F (36.7 °C)   SpO2: 98%   Weight: 160 lb (72.6 kg)   Height: 5' 8\" (1.727 m)         MDM    Ear was flushed with ear wax easily removed. TM shows no erythema. Ear canal does appear mildly swollen and it is tender to pull on pinna. I do believe patient has an external ear infection at this time, possibly d/t hearing aid. He was informed to try to refrain from placing hearing aid into right ear until symptoms improve. He was informed to follow up with ENT. He'll be given Norco for pain. No neurological deficits. Standard anticipatory guidance given to patient upon discharge. Have given them a specific time frame in which to follow-up and who to follow-up with. I have also advised them that they should return to the emergency department if they get worse, or not getting better or develop any new or concerning symptoms. Patient demonstrates understanding. CRITICAL CARE TIME   Total Critical Care time was 0 minutes, excluding separately reportable procedures.   There was a high probability of clinically significant/life threatening deterioration in the patient's condition which required my urgent intervention. Procedures    FINAL IMPRESSION      1. Acute otitis externa of right ear, unspecified type          DISPOSITION/PLAN   DISPOSITION Decision To Discharge 09/23/2018 04:49:54 AM      PATIENT REFERRED TO:  Aminata Brown MD  [address] [place] [phone]    In 3 days        DISCHARGE MEDICATIONS:  New Prescriptions    No medications on file          (Please note that portions of this note were completed with a voice recognition program.  Efforts were made to edit the dictations but occasionally words are mis-transcribed. )    LAURA Taylor (electronically signed)  Emergency Physician [address] [place] [address]  47/46/30 0961

## 2018-09-23 NOTE — ED NOTES
Ernie SANCHEZ at bedside reassessing right ear. Ear wax plug removed.       Samantha Oro RN  09/23/18 2339

## 2019-01-10 ENCOUNTER — HOSPITAL ENCOUNTER (OUTPATIENT)
Dept: PHYSICAL THERAPY | Age: 84
Setting detail: THERAPIES SERIES
Discharge: HOME | End: 2019-01-10
Payer: MEDICARE

## 2019-01-10 PROCEDURE — 97162 PT EVAL MOD COMPLEX 30 MIN: CPT

## 2019-01-10 PROCEDURE — G8979 MOBILITY GOAL STATUS: HCPCS

## 2019-01-10 PROCEDURE — G8978 MOBILITY CURRENT STATUS: HCPCS

## 2019-01-14 ENCOUNTER — HOSPITAL ENCOUNTER (OUTPATIENT)
Dept: PHYSICAL THERAPY | Age: 84
Setting detail: THERAPIES SERIES
Discharge: HOME | End: 2019-01-14
Payer: MEDICARE

## 2019-01-14 PROCEDURE — 97112 NEUROMUSCULAR REEDUCATION: CPT

## 2019-01-14 PROCEDURE — 97110 THERAPEUTIC EXERCISES: CPT

## 2019-01-18 ENCOUNTER — HOSPITAL ENCOUNTER (OUTPATIENT)
Dept: PHYSICAL THERAPY | Age: 84
Setting detail: THERAPIES SERIES
Discharge: HOME | End: 2019-01-18
Payer: MEDICARE

## 2019-01-18 PROCEDURE — 97112 NEUROMUSCULAR REEDUCATION: CPT

## 2019-01-18 PROCEDURE — 97110 THERAPEUTIC EXERCISES: CPT

## 2019-01-18 PROCEDURE — 97116 GAIT TRAINING THERAPY: CPT

## 2019-01-22 ENCOUNTER — HOSPITAL ENCOUNTER (OUTPATIENT)
Dept: PHYSICAL THERAPY | Age: 84
Setting detail: THERAPIES SERIES
Discharge: HOME | End: 2019-01-22
Payer: MEDICARE

## 2019-01-22 PROCEDURE — 97112 NEUROMUSCULAR REEDUCATION: CPT

## 2019-01-22 PROCEDURE — 97110 THERAPEUTIC EXERCISES: CPT

## 2019-01-22 ASSESSMENT — PAIN DESCRIPTION - FREQUENCY: FREQUENCY: INTERMITTENT

## 2019-01-22 ASSESSMENT — PAIN DESCRIPTION - PAIN TYPE: TYPE: CHRONIC PAIN

## 2019-01-22 ASSESSMENT — PAIN DESCRIPTION - LOCATION: LOCATION: BACK

## 2019-01-22 ASSESSMENT — PAIN DESCRIPTION - ORIENTATION: ORIENTATION: LOWER;RIGHT

## 2019-01-22 ASSESSMENT — PAIN SCALES - GENERAL: PAINLEVEL_OUTOF10: 4

## 2019-01-22 ASSESSMENT — PAIN DESCRIPTION - DESCRIPTORS: DESCRIPTORS: DULL

## 2019-01-25 ENCOUNTER — HOSPITAL ENCOUNTER (OUTPATIENT)
Dept: PHYSICAL THERAPY | Age: 84
Setting detail: THERAPIES SERIES
Discharge: HOME | End: 2019-01-25
Payer: MEDICARE

## 2019-01-25 PROCEDURE — 97110 THERAPEUTIC EXERCISES: CPT

## 2019-01-25 PROCEDURE — 97112 NEUROMUSCULAR REEDUCATION: CPT

## 2019-01-29 ENCOUNTER — HOSPITAL ENCOUNTER (OUTPATIENT)
Dept: PHYSICAL THERAPY | Age: 84
Setting detail: THERAPIES SERIES
Discharge: HOME | End: 2019-01-29
Payer: MEDICARE

## 2019-01-29 PROCEDURE — 97110 THERAPEUTIC EXERCISES: CPT

## 2019-02-01 ENCOUNTER — HOSPITAL ENCOUNTER (OUTPATIENT)
Dept: PHYSICAL THERAPY | Age: 84
Setting detail: THERAPIES SERIES
Discharge: HOME | End: 2019-02-01
Payer: MEDICARE

## 2019-02-01 PROCEDURE — 97116 GAIT TRAINING THERAPY: CPT

## 2019-02-01 PROCEDURE — 97110 THERAPEUTIC EXERCISES: CPT

## 2019-02-01 PROCEDURE — 97112 NEUROMUSCULAR REEDUCATION: CPT

## 2019-02-01 ASSESSMENT — PAIN SCALES - GENERAL: PAINLEVEL_OUTOF10: 4

## 2019-02-01 ASSESSMENT — PAIN DESCRIPTION - LOCATION: LOCATION: BACK

## 2019-02-01 ASSESSMENT — PAIN DESCRIPTION - DESCRIPTORS: DESCRIPTORS: ACHING

## 2019-02-01 ASSESSMENT — PAIN DESCRIPTION - ORIENTATION: ORIENTATION: LOWER;RIGHT

## 2019-02-05 ENCOUNTER — HOSPITAL ENCOUNTER (OUTPATIENT)
Dept: PHYSICAL THERAPY | Age: 84
Setting detail: THERAPIES SERIES
Discharge: HOME | End: 2019-02-05
Payer: MEDICARE

## 2019-02-05 PROCEDURE — 97112 NEUROMUSCULAR REEDUCATION: CPT

## 2019-02-05 PROCEDURE — 97110 THERAPEUTIC EXERCISES: CPT

## 2019-02-08 ENCOUNTER — HOSPITAL ENCOUNTER (OUTPATIENT)
Dept: PHYSICAL THERAPY | Age: 84
Setting detail: THERAPIES SERIES
Discharge: HOME | End: 2019-02-08
Payer: MEDICARE

## 2019-02-08 PROCEDURE — 97116 GAIT TRAINING THERAPY: CPT

## 2019-02-08 PROCEDURE — 97110 THERAPEUTIC EXERCISES: CPT

## 2019-02-08 PROCEDURE — G8980 MOBILITY D/C STATUS: HCPCS

## 2019-02-08 PROCEDURE — G8979 MOBILITY GOAL STATUS: HCPCS

## 2019-02-08 PROCEDURE — 97112 NEUROMUSCULAR REEDUCATION: CPT

## 2019-02-12 ENCOUNTER — APPOINTMENT (OUTPATIENT)
Dept: PHYSICAL THERAPY | Age: 84
End: 2019-02-12
Payer: MEDICARE

## 2019-02-15 ENCOUNTER — APPOINTMENT (OUTPATIENT)
Dept: PHYSICAL THERAPY | Age: 84
End: 2019-02-15
Payer: MEDICARE

## 2019-10-29 RX ORDER — SELEGILINE HYDROCHLORIDE 5 MG/1
TABLET ORAL
Qty: 30 TABLET | Refills: 4 | Status: SHIPPED | OUTPATIENT
Start: 2019-10-29

## 2019-11-04 LAB
ALBUMIN SERPL-MCNC: 4.4 G/DL (ref 3.5–4.6)
ALP BLD-CCNC: 80 U/L (ref 35–104)
ALT SERPL-CCNC: <5 U/L (ref 0–41)
AST SERPL-CCNC: 20 U/L (ref 0–40)
BILIRUB SERPL-MCNC: 0.4 MG/DL (ref 0.2–0.7)
BILIRUBIN DIRECT: <0.2 MG/DL (ref 0–0.4)
BILIRUBIN, INDIRECT: NORMAL MG/DL (ref 0–0.6)
CHOLESTEROL, TOTAL: 132 MG/DL (ref 0–199)
HDLC SERPL-MCNC: 69 MG/DL (ref 40–59)
LDL CHOLESTEROL CALCULATED: 45 MG/DL (ref 0–129)
TOTAL PROTEIN: 7.3 G/DL (ref 6.3–8)
TRIGL SERPL-MCNC: 92 MG/DL (ref 0–150)

## 2019-11-20 ENCOUNTER — OFFICE VISIT (OUTPATIENT)
Dept: NEUROLOGY | Age: 84
End: 2019-11-20
Payer: MEDICARE

## 2019-11-20 VITALS
WEIGHT: 155.8 LBS | HEART RATE: 76 BPM | RESPIRATION RATE: 16 BRPM | BODY MASS INDEX: 23.61 KG/M2 | DIASTOLIC BLOOD PRESSURE: 77 MMHG | SYSTOLIC BLOOD PRESSURE: 168 MMHG | HEIGHT: 68 IN

## 2019-11-20 DIAGNOSIS — R26.0 ATAXIC GAIT: ICD-10-CM

## 2019-11-20 DIAGNOSIS — M54.41 CHRONIC MIDLINE LOW BACK PAIN WITH RIGHT-SIDED SCIATICA: ICD-10-CM

## 2019-11-20 DIAGNOSIS — G89.29 CHRONIC MIDLINE LOW BACK PAIN WITH RIGHT-SIDED SCIATICA: ICD-10-CM

## 2019-11-20 DIAGNOSIS — G20 PARKINSONIAN TREMOR (HCC): ICD-10-CM

## 2019-11-20 DIAGNOSIS — G20 PARKINSON'S SYNDROME (HCC): Primary | ICD-10-CM

## 2019-11-20 PROBLEM — G20.C PARKINSONIAN TREMOR: Status: ACTIVE | Noted: 2019-11-20

## 2019-11-20 PROCEDURE — 99214 OFFICE O/P EST MOD 30 MIN: CPT | Performed by: PSYCHIATRY & NEUROLOGY

## 2019-11-20 RX ORDER — SERTRALINE HYDROCHLORIDE 25 MG/1
25 TABLET, FILM COATED ORAL DAILY
Qty: 30 TABLET | Refills: 3 | Status: SHIPPED | OUTPATIENT
Start: 2019-11-20

## 2019-11-20 ASSESSMENT — ENCOUNTER SYMPTOMS
SHORTNESS OF BREATH: 0
CHOKING: 0
NAUSEA: 0
TROUBLE SWALLOWING: 0
BACK PAIN: 0
PHOTOPHOBIA: 0
VOMITING: 0

## 2019-12-02 ENCOUNTER — TELEPHONE (OUTPATIENT)
Dept: NEUROLOGY | Age: 84
End: 2019-12-02

## 2019-12-02 NOTE — TELEPHONE ENCOUNTER
Rohith, [address] [place] [address],Third Floor in [place] called, Rx was sent on 11/20/19 for Zoloft but also got an rx 10/25/19 for Selegeline and stated that there is a major interaction with these medications and needs to clarify if which he should be taking.   Please advise  Thanks  Saira Padilla

## 2020-03-02 ENCOUNTER — HOSPITAL ENCOUNTER (EMERGENCY)
Age: 85
Discharge: HOME | DRG: 057 | End: 2020-03-02
Payer: MEDICARE

## 2020-03-02 ENCOUNTER — HOSPITAL ENCOUNTER (INPATIENT)
Age: 85
LOS: 11 days | Discharge: NURSING, REHAB OR LONG-TERM CARE | DRG: 057 | End: 2020-03-13
Attending: PHYSICAL MEDICINE & REHABILITATION | Admitting: PHYSICAL MEDICINE & REHABILITATION
Payer: MEDICARE

## 2020-03-02 ENCOUNTER — APPOINTMENT (OUTPATIENT)
Dept: CT IMAGING | Age: 85
DRG: 057 | End: 2020-03-02
Payer: MEDICARE

## 2020-03-02 ENCOUNTER — APPOINTMENT (OUTPATIENT)
Dept: GENERAL RADIOLOGY | Age: 85
DRG: 057 | End: 2020-03-02
Payer: MEDICARE

## 2020-03-02 VITALS
RESPIRATION RATE: 16 BRPM | HEART RATE: 77 BPM | DIASTOLIC BLOOD PRESSURE: 87 MMHG | HEIGHT: 68 IN | OXYGEN SATURATION: 97 % | SYSTOLIC BLOOD PRESSURE: 169 MMHG | TEMPERATURE: 98.6 F | BODY MASS INDEX: 22.73 KG/M2 | WEIGHT: 150 LBS

## 2020-03-02 PROBLEM — R26.9 ABNORMALITY OF GAIT AND MOBILITY: Status: ACTIVE | Noted: 2020-03-02

## 2020-03-02 PROBLEM — Z85.46 HISTORY OF PROSTATE CANCER: Status: ACTIVE | Noted: 2018-05-11

## 2020-03-02 PROBLEM — M75.81 RIGHT ROTATOR CUFF TENDONITIS: Status: ACTIVE | Noted: 2017-01-25

## 2020-03-02 PROBLEM — R26.9 GAIT ABNORMALITY: Status: ACTIVE | Noted: 2020-03-02

## 2020-03-02 PROBLEM — Z87.891 FORMER SMOKER: Status: ACTIVE | Noted: 2018-05-11

## 2020-03-02 PROBLEM — M75.40 IMPINGEMENT SYNDROME, SHOULDER: Status: ACTIVE | Noted: 2017-01-25

## 2020-03-02 LAB
ALBUMIN SERPL-MCNC: 4.4 G/DL (ref 3.5–4.6)
ALP BLD-CCNC: 56 U/L (ref 35–104)
ALT SERPL-CCNC: 7 U/L (ref 0–41)
AMPHETAMINE SCREEN, URINE: NORMAL
ANION GAP SERPL CALCULATED.3IONS-SCNC: 12 MEQ/L (ref 9–15)
APTT: 31.4 SEC (ref 24.4–36.8)
AST SERPL-CCNC: 28 U/L (ref 0–40)
BACTERIA: NEGATIVE /HPF
BARBITURATE SCREEN URINE: NORMAL
BASOPHILS ABSOLUTE: 0.1 K/UL (ref 0–0.2)
BASOPHILS RELATIVE PERCENT: 0.9 %
BENZODIAZEPINE SCREEN, URINE: NORMAL
BILIRUB SERPL-MCNC: 0.4 MG/DL (ref 0.2–0.7)
BILIRUBIN URINE: NEGATIVE
BLOOD, URINE: NEGATIVE
BUN BLDV-MCNC: 23 MG/DL (ref 8–23)
CALCIUM SERPL-MCNC: 9.7 MG/DL (ref 8.5–9.9)
CANNABINOID SCREEN URINE: NORMAL
CHLORIDE BLD-SCNC: 102 MEQ/L (ref 95–107)
CK MB: 12 NG/ML (ref 0–6.7)
CLARITY: CLEAR
CO2: 28 MEQ/L (ref 20–31)
COCAINE METABOLITE SCREEN URINE: NORMAL
COLOR: YELLOW
CREAT SERPL-MCNC: 1.32 MG/DL (ref 0.7–1.2)
CREATINE KINASE-MB INDEX: 6.2 % (ref 0–3.5)
EKG ATRIAL RATE: 69 BPM
EKG P AXIS: 51 DEGREES
EKG P-R INTERVAL: 204 MS
EKG Q-T INTERVAL: 416 MS
EKG QRS DURATION: 84 MS
EKG QTC CALCULATION (BAZETT): 445 MS
EKG R AXIS: 4 DEGREES
EKG T AXIS: 67 DEGREES
EKG VENTRICULAR RATE: 69 BPM
EOSINOPHILS ABSOLUTE: 0.1 K/UL (ref 0–0.7)
EOSINOPHILS RELATIVE PERCENT: 1 %
EPITHELIAL CELLS, UA: ABNORMAL /HPF (ref 0–5)
ETHANOL PERCENT: NORMAL G/DL
ETHANOL: <10 MG/DL (ref 0–0.08)
GFR AFRICAN AMERICAN: >60
GFR NON-AFRICAN AMERICAN: 51
GLOBULIN: 2.8 G/DL (ref 2.3–3.5)
GLUCOSE BLD-MCNC: 176 MG/DL (ref 70–99)
GLUCOSE BLD-MCNC: 222 MG/DL (ref 60–115)
GLUCOSE URINE: NEGATIVE MG/DL
HCT VFR BLD CALC: 43.5 % (ref 42–52)
HEMOGLOBIN: 14.4 G/DL (ref 14–18)
HYALINE CASTS: ABNORMAL /HPF (ref 0–5)
INFLUENZA A BY PCR: NEGATIVE
INFLUENZA B BY PCR: NEGATIVE
INR BLD: 1
KETONES, URINE: NEGATIVE MG/DL
LACTIC ACID: 0.8 MMOL/L (ref 0.5–2.2)
LACTIC ACID: 1.2 MMOL/L (ref 0.5–2.2)
LEUKOCYTE ESTERASE, URINE: NEGATIVE
LYMPHOCYTES ABSOLUTE: 1.2 K/UL (ref 1–4.8)
LYMPHOCYTES RELATIVE PERCENT: 14.7 %
Lab: NORMAL
MAGNESIUM: 1.6 MG/DL (ref 1.7–2.4)
MCH RBC QN AUTO: 28.3 PG (ref 27–31.3)
MCHC RBC AUTO-ENTMCNC: 33.1 % (ref 33–37)
MCV RBC AUTO: 85.6 FL (ref 80–100)
METHADONE SCREEN, URINE: NORMAL
MONOCYTES ABSOLUTE: 0.5 K/UL (ref 0.2–0.8)
MONOCYTES RELATIVE PERCENT: 6.7 %
NEUTROPHILS ABSOLUTE: 6.1 K/UL (ref 1.4–6.5)
NEUTROPHILS RELATIVE PERCENT: 76.7 %
NITRITE, URINE: NEGATIVE
OPIATE SCREEN URINE: NORMAL
OXYCODONE URINE: NORMAL
PDW BLD-RTO: 15 % (ref 11.5–14.5)
PERFORMED ON: ABNORMAL
PH UA: 6 (ref 5–9)
PHENCYCLIDINE SCREEN URINE: NORMAL
PLATELET # BLD: 214 K/UL (ref 130–400)
POTASSIUM SERPL-SCNC: 4.2 MEQ/L (ref 3.4–4.9)
PRO-BNP: 1005 PG/ML
PROPOXYPHENE SCREEN: NORMAL
PROTEIN UA: 100 MG/DL
PROTHROMBIN TIME: 13.2 SEC (ref 12.3–14.9)
RBC # BLD: 5.08 M/UL (ref 4.7–6.1)
RBC UA: ABNORMAL /HPF (ref 0–5)
SODIUM BLD-SCNC: 142 MEQ/L (ref 135–144)
SPECIFIC GRAVITY UA: 1.02 (ref 1–1.03)
TOTAL CK: 195 U/L (ref 0–190)
TOTAL PROTEIN: 7.2 G/DL (ref 6.3–8)
TROPONIN: 0.16 NG/ML (ref 0–0.01)
TSH REFLEX: 2.17 UIU/ML (ref 0.44–3.86)
URINE REFLEX TO CULTURE: YES
UROBILINOGEN, URINE: 1 E.U./DL
WBC # BLD: 8 K/UL (ref 4.8–10.8)
WBC UA: ABNORMAL /HPF (ref 0–5)

## 2020-03-02 PROCEDURE — 6370000000 HC RX 637 (ALT 250 FOR IP): Performed by: NURSE PRACTITIONER

## 2020-03-02 PROCEDURE — 93005 ELECTROCARDIOGRAM TRACING: CPT | Performed by: NURSE PRACTITIONER

## 2020-03-02 PROCEDURE — 82550 ASSAY OF CK (CPK): CPT

## 2020-03-02 PROCEDURE — 82553 CREATINE MB FRACTION: CPT

## 2020-03-02 PROCEDURE — 71045 X-RAY EXAM CHEST 1 VIEW: CPT

## 2020-03-02 PROCEDURE — 81001 URINALYSIS AUTO W/SCOPE: CPT

## 2020-03-02 PROCEDURE — 83880 ASSAY OF NATRIURETIC PEPTIDE: CPT

## 2020-03-02 PROCEDURE — 97167 OT EVAL HIGH COMPLEX 60 MIN: CPT

## 2020-03-02 PROCEDURE — 97163 PT EVAL HIGH COMPLEX 45 MIN: CPT

## 2020-03-02 PROCEDURE — 99221 1ST HOSP IP/OBS SF/LOW 40: CPT | Performed by: PHYSICAL MEDICINE & REHABILITATION

## 2020-03-02 PROCEDURE — 36415 COLL VENOUS BLD VENIPUNCTURE: CPT

## 2020-03-02 PROCEDURE — 83605 ASSAY OF LACTIC ACID: CPT

## 2020-03-02 PROCEDURE — 80307 DRUG TEST PRSMV CHEM ANLYZR: CPT

## 2020-03-02 PROCEDURE — 70450 CT HEAD/BRAIN W/O DYE: CPT

## 2020-03-02 PROCEDURE — G0480 DRUG TEST DEF 1-7 CLASSES: HCPCS

## 2020-03-02 PROCEDURE — 80053 COMPREHEN METABOLIC PANEL: CPT

## 2020-03-02 PROCEDURE — 6370000000 HC RX 637 (ALT 250 FOR IP): Performed by: PHYSICAL MEDICINE & REHABILITATION

## 2020-03-02 PROCEDURE — 83735 ASSAY OF MAGNESIUM: CPT

## 2020-03-02 PROCEDURE — 87040 BLOOD CULTURE FOR BACTERIA: CPT

## 2020-03-02 PROCEDURE — 84443 ASSAY THYROID STIM HORMONE: CPT

## 2020-03-02 PROCEDURE — 85025 COMPLETE CBC W/AUTO DIFF WBC: CPT

## 2020-03-02 PROCEDURE — 87086 URINE CULTURE/COLONY COUNT: CPT

## 2020-03-02 PROCEDURE — 85730 THROMBOPLASTIN TIME PARTIAL: CPT

## 2020-03-02 PROCEDURE — 87502 INFLUENZA DNA AMP PROBE: CPT

## 2020-03-02 PROCEDURE — 6360000002 HC RX W HCPCS: Performed by: PHYSICAL MEDICINE & REHABILITATION

## 2020-03-02 PROCEDURE — 85610 PROTHROMBIN TIME: CPT

## 2020-03-02 PROCEDURE — 72125 CT NECK SPINE W/O DYE: CPT

## 2020-03-02 PROCEDURE — 99285 EMERGENCY DEPT VISIT HI MDM: CPT

## 2020-03-02 PROCEDURE — 94761 N-INVAS EAR/PLS OXIMETRY MLT: CPT

## 2020-03-02 PROCEDURE — 1180000000 HC REHAB R&B

## 2020-03-02 PROCEDURE — 84484 ASSAY OF TROPONIN QUANT: CPT

## 2020-03-02 RX ORDER — SODIUM PHOSPHATE, DIBASIC AND SODIUM PHOSPHATE, MONOBASIC 7; 19 G/133ML; G/133ML
1 ENEMA RECTAL DAILY PRN
Status: DISCONTINUED | OUTPATIENT
Start: 2020-03-02 | End: 2020-03-13 | Stop reason: HOSPADM

## 2020-03-02 RX ORDER — NITROGLYCERIN 0.4 MG/1
0.4 TABLET SUBLINGUAL EVERY 5 MIN PRN
Status: DISCONTINUED | OUTPATIENT
Start: 2020-03-02 | End: 2020-03-13 | Stop reason: HOSPADM

## 2020-03-02 RX ORDER — PANTOPRAZOLE SODIUM 40 MG/1
40 TABLET, DELAYED RELEASE ORAL
Status: DISCONTINUED | OUTPATIENT
Start: 2020-03-03 | End: 2020-03-13 | Stop reason: HOSPADM

## 2020-03-02 RX ORDER — ROPINIROLE 1 MG/1
2 TABLET, FILM COATED ORAL NIGHTLY
Status: DISCONTINUED | OUTPATIENT
Start: 2020-03-02 | End: 2020-03-13 | Stop reason: HOSPADM

## 2020-03-02 RX ORDER — ROSUVASTATIN CALCIUM 5 MG/1
10 TABLET, COATED ORAL NIGHTLY
Status: DISCONTINUED | OUTPATIENT
Start: 2020-03-02 | End: 2020-03-13 | Stop reason: HOSPADM

## 2020-03-02 RX ORDER — AMLODIPINE BESYLATE 5 MG/1
5 TABLET ORAL DAILY
Status: DISCONTINUED | OUTPATIENT
Start: 2020-03-02 | End: 2020-03-13 | Stop reason: HOSPADM

## 2020-03-02 RX ORDER — LEVOTHYROXINE SODIUM 0.03 MG/1
25 TABLET ORAL DAILY
Status: DISCONTINUED | OUTPATIENT
Start: 2020-03-02 | End: 2020-03-13 | Stop reason: HOSPADM

## 2020-03-02 RX ORDER — ASPIRIN 81 MG/1
81 TABLET, CHEWABLE ORAL DAILY
Status: DISCONTINUED | OUTPATIENT
Start: 2020-03-02 | End: 2020-03-02

## 2020-03-02 RX ORDER — DEXTROSE MONOHYDRATE 25 G/50ML
12.5 INJECTION, SOLUTION INTRAVENOUS PRN
Status: DISCONTINUED | OUTPATIENT
Start: 2020-03-02 | End: 2020-03-13 | Stop reason: HOSPADM

## 2020-03-02 RX ORDER — TIMOLOL MALEATE 5 MG/ML
1 SOLUTION/ DROPS OPHTHALMIC DAILY
Status: DISCONTINUED | OUTPATIENT
Start: 2020-03-02 | End: 2020-03-04

## 2020-03-02 RX ORDER — ASPIRIN 81 MG/1
81 TABLET, CHEWABLE ORAL NIGHTLY
Status: DISCONTINUED | OUTPATIENT
Start: 2020-03-02 | End: 2020-03-13 | Stop reason: HOSPADM

## 2020-03-02 RX ORDER — CLOPIDOGREL BISULFATE 75 MG/1
75 TABLET ORAL DAILY
Status: DISCONTINUED | OUTPATIENT
Start: 2020-03-02 | End: 2020-03-13 | Stop reason: HOSPADM

## 2020-03-02 RX ORDER — NICOTINE POLACRILEX 4 MG
15 LOZENGE BUCCAL PRN
Status: DISCONTINUED | OUTPATIENT
Start: 2020-03-02 | End: 2020-03-13 | Stop reason: HOSPADM

## 2020-03-02 RX ORDER — MECLIZINE HCL 12.5 MG/1
12.5 TABLET ORAL 3 TIMES DAILY PRN
Status: DISCONTINUED | OUTPATIENT
Start: 2020-03-02 | End: 2020-03-13 | Stop reason: HOSPADM

## 2020-03-02 RX ORDER — LATANOPROST 50 UG/ML
1 SOLUTION/ DROPS OPHTHALMIC NIGHTLY
Status: DISCONTINUED | OUTPATIENT
Start: 2020-03-02 | End: 2020-03-02

## 2020-03-02 RX ORDER — SERTRALINE HYDROCHLORIDE 25 MG/1
25 TABLET, FILM COATED ORAL DAILY
Status: DISCONTINUED | OUTPATIENT
Start: 2020-03-02 | End: 2020-03-13 | Stop reason: HOSPADM

## 2020-03-02 RX ORDER — CLONIDINE HYDROCHLORIDE 0.1 MG/1
0.1 TABLET ORAL 2 TIMES DAILY
Status: DISCONTINUED | OUTPATIENT
Start: 2020-03-02 | End: 2020-03-02 | Stop reason: HOSPADM

## 2020-03-02 RX ORDER — AMANTADINE HYDROCHLORIDE 100 MG/1
100 CAPSULE, GELATIN COATED ORAL DAILY
Status: DISCONTINUED | OUTPATIENT
Start: 2020-03-02 | End: 2020-03-13 | Stop reason: HOSPADM

## 2020-03-02 RX ORDER — ACETAMINOPHEN 325 MG/1
650 TABLET ORAL EVERY 4 HOURS PRN
Status: DISCONTINUED | OUTPATIENT
Start: 2020-03-02 | End: 2020-03-13 | Stop reason: HOSPADM

## 2020-03-02 RX ORDER — DEXTROSE MONOHYDRATE 50 MG/ML
100 INJECTION, SOLUTION INTRAVENOUS PRN
Status: DISCONTINUED | OUTPATIENT
Start: 2020-03-02 | End: 2020-03-13 | Stop reason: HOSPADM

## 2020-03-02 RX ORDER — LISINOPRIL 20 MG/1
20 TABLET ORAL 2 TIMES DAILY
Status: DISCONTINUED | OUTPATIENT
Start: 2020-03-02 | End: 2020-03-13 | Stop reason: HOSPADM

## 2020-03-02 RX ORDER — HYDROCODONE BITARTRATE AND ACETAMINOPHEN 5; 325 MG/1; MG/1
1 TABLET ORAL EVERY 6 HOURS PRN
Status: DISCONTINUED | OUTPATIENT
Start: 2020-03-02 | End: 2020-03-13 | Stop reason: HOSPADM

## 2020-03-02 RX ORDER — METOPROLOL TARTRATE 50 MG/1
50 TABLET, FILM COATED ORAL 2 TIMES DAILY
Status: DISCONTINUED | OUTPATIENT
Start: 2020-03-02 | End: 2020-03-13 | Stop reason: HOSPADM

## 2020-03-02 RX ORDER — SELEGILINE HYDROCHLORIDE 5 MG/1
5 CAPSULE ORAL DAILY
Status: DISCONTINUED | OUTPATIENT
Start: 2020-03-02 | End: 2020-03-13 | Stop reason: HOSPADM

## 2020-03-02 RX ORDER — LATANOPROST 50 UG/ML
1 SOLUTION/ DROPS OPHTHALMIC NIGHTLY
Status: DISCONTINUED | OUTPATIENT
Start: 2020-03-02 | End: 2020-03-13 | Stop reason: HOSPADM

## 2020-03-02 RX ADMIN — CARBIDOPA AND LEVODOPA 1 TABLET: 25; 100 TABLET ORAL at 17:55

## 2020-03-02 RX ADMIN — METOPROLOL TARTRATE 50 MG: 50 TABLET, FILM COATED ORAL at 20:56

## 2020-03-02 RX ADMIN — ROPINIROLE HYDROCHLORIDE 2 MG: 1 TABLET, FILM COATED ORAL at 20:56

## 2020-03-02 RX ADMIN — LATANOPROST 1 DROP: 50 SOLUTION OPHTHALMIC at 20:56

## 2020-03-02 RX ADMIN — SELEGILINE HYDROCHLORIDE 5 MG: 5 CAPSULE ORAL at 20:57

## 2020-03-02 RX ADMIN — ROSUVASTATIN CALCIUM 10 MG: 5 TABLET, COATED ORAL at 20:56

## 2020-03-02 RX ADMIN — SERTRALINE HYDROCHLORIDE 25 MG: 25 TABLET ORAL at 17:55

## 2020-03-02 RX ADMIN — ASPIRIN 81 MG 81 MG: 81 TABLET ORAL at 20:55

## 2020-03-02 RX ADMIN — CARBIDOPA AND LEVODOPA 1 TABLET: 25; 100 TABLET ORAL at 20:55

## 2020-03-02 RX ADMIN — ACETAMINOPHEN 650 MG: 325 TABLET ORAL at 21:02

## 2020-03-02 RX ADMIN — AMLODIPINE BESYLATE 5 MG: 5 TABLET ORAL at 17:55

## 2020-03-02 RX ADMIN — CLONIDINE HYDROCHLORIDE 0.1 MG: 0.1 TABLET ORAL at 11:50

## 2020-03-02 RX ADMIN — AMANTADINE HYDROCHLORIDE 100 MG: 100 CAPSULE ORAL at 17:55

## 2020-03-02 RX ADMIN — LISINOPRIL 20 MG: 20 TABLET ORAL at 17:55

## 2020-03-02 RX ADMIN — CLOPIDOGREL BISULFATE 75 MG: 75 TABLET ORAL at 17:55

## 2020-03-02 RX ADMIN — ENOXAPARIN SODIUM 40 MG: 40 INJECTION SUBCUTANEOUS at 17:55

## 2020-03-02 ASSESSMENT — ENCOUNTER SYMPTOMS
DIARRHEA: 0
CONSTIPATION: 1
BACK PAIN: 1
EYE REDNESS: 0
SWOLLEN GLANDS: 0
STRIDOR: 0
VISUAL CHANGE: 0
ABDOMINAL PAIN: 0
PHOTOPHOBIA: 0
BLOOD IN STOOL: 0
EYE PAIN: 0
SORE THROAT: 0
SHORTNESS OF BREATH: 0
NAUSEA: 0
RHINORRHEA: 0
COUGH: 0
VOMITING: 0
BACK PAIN: 0
WHEEZING: 0
SHORTNESS OF BREATH: 1

## 2020-03-02 ASSESSMENT — PAIN SCALES - GENERAL
PAINLEVEL_OUTOF10: 4
PAINLEVEL_OUTOF10: 0
PAINLEVEL_OUTOF10: 0
PAINLEVEL_OUTOF10: 4

## 2020-03-02 ASSESSMENT — PAIN DESCRIPTION - PAIN TYPE
TYPE: CHRONIC PAIN

## 2020-03-02 ASSESSMENT — PAIN DESCRIPTION - LOCATION
LOCATION: GENERALIZED
LOCATION: BACK

## 2020-03-02 NOTE — CONSULTS
symptoms include weakness. Pertinent negatives include no abdominal pain, chest pain, dysuria, fever, headaches, numbness, paresis, paresthesias or perianal numbness. Risk factors include history of cancer (prostate). He has tried analgesics, home exercises and walking (Tylenol #3, OTC muscle rubs, Trigger Point Injections, Lidderm patches, Robaxin) for the symptoms. The treatment provided moderate (Past injections helpful. Tylenol #3, Therma Mend cream) relief. Muscle Pain   The current episode started more than 1 year ago. The problem occurs constantly. The problem is unchanged. The pain is present in the upper back. The pain is medium. Exacerbated by: standing upright, walking and laying on side. Associated symptoms include constipation, fatigue, joint swelling, shortness of breath and weakness. Pertinent negatives include no abdominal pain, chest pain, diarrhea, dysuria, eye pain, fever, headaches, nausea, rash, stiffness, sensory change, swollen glands, urinary symptoms, vaginal discharge, visual change, vomiting or wheezing. Past treatments include prescription narcotic (thermamend muscle/pain cream, injections, Tylenol #3). The treatment provided moderate relief. There is no swelling present. He has been behaving normally. His past medical history is significant for chronic back pain. There is no history of rheumatic disease or sickle cell disease. (Parkinson's)   Neck Pain    This is a chronic problem. The current episode started more than 1 year ago. The problem occurs intermittently. The problem has been waxing and waning. The pain is present in the midline. The quality of the pain is described as aching. The pain is at a severity of 4/10. The pain is moderate. The symptoms are aggravated by position. Stiffness is present in the morning. Associated symptoms include weakness. Pertinent negatives include no chest pain, fever, headaches, numbness, paresis, photophobia or visual change.  He has tried home along EOB  Comments: unable to safely stand at device in order to progress fwd,      FIMS: ,  , Assessment: Pt with trunkal and LE rigidity, decreased coordination and postural control, limiting his ability to safely stand and ambulate. Pt required heavy assist for bed mobility and transfers, continue PT to progress mobility as tolerated. Occupational therapy: FIMS:   ,  , Assessment: Pt. is an 80year old man from home with spouse who presents to [facility] with the above deficits which impact his ability to perform ADLs and IADLs. Pt. would benefit from continued OT to maximize independence and safety with ADL tasks and decrease caregiver burden. ADL  Feeding: Setup (03/02/20 1243)  Grooming: Setup (03/02/20 1243)  UE Bathing: Minimal assistance (03/02/20 1243)  LE Bathing: Maximum assistance (03/02/20 1243)  UE Dressing: Minimal assistance (03/02/20 1243)  LE Dressing: Maximum assistance (03/02/20 1243)  Toileting: Maximum assistance (03/02/20 1243)  Additional Comments: Simulated ADLs as above. Unable to stand unsupported, decreased safety and sequencing throughout (03/02/20 1243)    LTG:                 Speech therapy: FIMS:          Past Medical History:          Diagnosis Date    Cancer ([address] 1996    Prostate    Chronic back pain     Essential hypertension 10/14/2016    History of blood clots 02/2010    Right leg    History of kidney stones 2004    Hyperlipidemia     Low back pain with sciatica     Parkinson disease ([address]     Type 2 diabetes mellitus without retinopathy ([address] 9/23/2015    Vertigo          PastSurgical History:          Procedure Laterality Date    APPENDECTOMY      CORONARY ANGIOPLASTY WITH STENT PLACEMENT      EYE SURGERY      EYE SURGERY  2009    Left lower lid    LITHOTRIPSY  1/7/14    DR. CARMONA    PROSTATE SURGERY      ROTATOR CUFF REPAIR  2003    Right shoulder    UPPER GASTROINTESTINAL ENDOSCOPY           Allergies:   No Known Allergies     CurrentMedications: warm and dry. Coloration: Skin is pale. Findings: Bruising present. Comments: Old IV sites   Neurological:      Sensory: Sensory deficit present. Coordination: Coordination abnormal.      Gait: Gait abnormal.      Deep Tendon Reflexes:      Reflex Scores:       Tricep reflexes are 1+ on the right side and 1+ on the left side. Bicep reflexes are 1+ on the right side and 1+ on the left side. Brachioradialis reflexes are 1+ on the right side and 1+ on the left side. Patellar reflexes are 0 on the right side and 0 on the left side. Achilles reflexes are 0 on the right side and 0 on the left side. Psychiatric:         Attention and Perception: He is attentive. Mood and Affect: Mood is not anxious or depressed. Affect is not angry. Speech: Speech is delayed. Speech is not rapid and pressured. Behavior: Behavior is slowed. Behavior is not withdrawn. Thought Content: Thought content normal.         Cognition and Memory: Memory is not impaired. He exhibits impaired recent memory. He does not exhibit impaired remote memory. Judgment: Judgment is not impulsive or inappropriate. Ortho Exam  Neurologic Exam     Mental Status   Follows 1 step commands. Attention: decreased. Concentration: decreased. Level of consciousness: drowsy  Knowledge: inconsistent with education. Able to name object. Unable to read. Able to repeat. Unable to write. Abnormal comprehension. Cranial Nerves     CN III, IV, VI   Pupils are equal, round, and reactive to light.     Motor Exam   Muscle bulk: decreased  Right arm tone: cogwheel rigidity  Left arm tone: cogwheel rigidity    Gait, Coordination, and Reflexes     Gait  Gait: shuffling    Reflexes   Right brachioradialis: 1+  Left brachioradialis: 1+  Right biceps: 1+  Left biceps: 1+  Right triceps: 1+  Left triceps: 1+  Right patellar: 0  Left patellar: 0  Right achilles: 0  Left achilles: Urine Reflex to Culture YES    Urine Drug Screen    Collection Time: 03/02/20  9:15 AM   Result Value Ref Range    Amphetamine Screen, Urine Neg Negative <1000 ng/mL    Barbiturate Screen, Ur Neg Negative < 200 ng/mL    Benzodiazepine Screen, Urine Neg Negative < 200 ng/mL    Cannabinoid Scrn, Ur Neg Negative < 50 ng/mL    Cocaine Metabolite Screen, Urine Neg Negative < 300 ng/mL    Opiate Scrn, Ur Neg Negative < 300 ng/mL    PCP Screen, Urine Neg Negative < 25 ng/mL    Methadone Screen, Urine Neg Negative <300 ng/mL    Propoxyphene Scrn, Ur Neg Negative <300 ng/mL    Oxycodone Urine Neg Negative <100 ng/mL    Drug Screen Comment: see below    Ethanol    Collection Time: 03/02/20  9:15 AM   Result Value Ref Range    Ethanol Lvl <10 mg/dL    Ethanol percent Not indicated G/dL   Lactic Acid, Plasma    Collection Time: 03/02/20  9:15 AM   Result Value Ref Range    Lactic Acid 1.2 0.5 - 2.2 mmol/L   Troponin    Collection Time: 03/02/20  9:15 AM   Result Value Ref Range    Troponin 0.161 (HH) 0.000 - 0.010 ng/mL   CK    Collection Time: 03/02/20  9:15 AM   Result Value Ref Range    Total  (H) 0 - 190 U/L   Magnesium    Collection Time: 03/02/20  9:15 AM   Result Value Ref Range    Magnesium 1.6 (L) 1.7 - 2.4 mg/dL   Protime-INR    Collection Time: 03/02/20  9:15 AM   Result Value Ref Range    Protime 13.2 12.3 - 14.9 sec    INR 1.0    APTT    Collection Time: 03/02/20  9:15 AM   Result Value Ref Range    aPTT 31.4 24.4 - 36.8 sec   Brain Natriuretic Peptide    Collection Time: 03/02/20  9:15 AM   Result Value Ref Range    Pro-BNP 1,005 pg/mL   TSH with Reflex    Collection Time: 03/02/20  9:15 AM   Result Value Ref Range    TSH 2.170 0.440 - 3.860 uIU/mL   Microscopic Urinalysis    Collection Time: 03/02/20  9:15 AM   Result Value Ref Range    Bacteria, UA Negative Negative /HPF    Hyaline Casts, UA 0-1 0 - 5 /HPF    WBC, UA 0-2 0 - 5 /HPF    RBC, UA 3-5 (A) 0 - 5 /HPF    Epithelial Cells, UA 0-2 0 - 5 /HPF   CKMB

## 2020-03-02 NOTE — ED PROVIDER NOTES
[address] [facility] ED  EMERGENCY DEPARTMENT ENCOUNTER      Pt Name: Scooter Sanderson  MRN: [id number]  [place] 10/6/1930  Date of evaluation: 3/2/2020  Provider: Jones Lopez       Chief Complaint   Patient presents with    Altered Mental Status     Times four days         HISTORY OF PRESENT ILLNESS   (Location/Symptom, Timing/Onset,Context/Setting, Quality, Duration, Modifying Factors, Severity)  Note limiting factors. Scooter Sanderson is a 80 y.o. male who presents to the emergency department for evaluation of altered mental status for the past 4 days. Family has noted some increasing agitation, aggression and hostility for the past 4 days, this is particularly worse at night time. Long standing history of parkinsons dementia but sent to ED today by PCP Dr. Dylan Reyes (PCP) due to having multiple falls and behavior unable to be tolerated by wife whom is undergoing cancer treatment. No headache, fsc, cp, sob, palpitations, nvdc or abdominal pain. Location/Symptom - eval  Onset - 4 days  Context/Setting - as above  Quality - ams, agitation  Duration - 4 days  Modifying Factors - none obvious  Severity - moderate to severe        Nursing Notes were reviewed. REVIEW OF SYSTEMS    (2-9 systems for level 4, 10 or more for level 5)     Review of Systems   Constitutional: Negative for chills, diaphoresis, fatigue and fever. HENT: Negative for congestion, rhinorrhea and sore throat. Eyes: Negative for photophobia and pain. Respiratory: Negative for cough and shortness of breath. Cardiovascular: Negative for chest pain and palpitations. Gastrointestinal: Negative for abdominal pain, diarrhea, nausea and vomiting. Genitourinary: Negative for dysuria and flank pain. Musculoskeletal: Negative for back pain. Skin: Negative for rash. Neurological: Negative for dizziness, light-headedness and headaches.    Psychiatric/Behavioral: Positive for agitation and

## 2020-03-02 NOTE — ED NOTES
NP Alexander at bedside to discuss admit with pt and family. Pt taking PO fluids without difficulty. Pt remains alert, oriented and occasional agitated about being here. Cooperative and directable.      Andre Zayas, RN  03/02/20 9263

## 2020-03-02 NOTE — PROGRESS NOTES
Solving: Assistance required to implement solutions, Assistance required to identify errors made, Assistance required to correct errors made, Assistance required to generate solutions  Insights: Decreased awareness of deficits  Initiation: Requires cues for some  Sequencing: Requires cues for some  Cognition Comment: Increased time for problem solving and sequencing    Perception Status:  Perception  Overall Perceptual Status: WFL    Sensation Status:  Sensation  Overall Sensation Status: [facility]    Vision and Hearing Status:  Vision  Vision: Within Functional Limits  Hearing  Hearing: Exceptions to [facility]  Hearing Exceptions: Hard of hearing/hearing concerns, Bilateral hearing aid(Spouse has for safe keeping)     ROM:   LUE AROM (degrees)  LUE AROM : WFL  LUE General AROM: Increased time for full ROM  Left Hand AROM (degrees)  Left Hand AROM: WFL  RUE AROM (degrees)  RUE AROM : WFL  RUE General AROM: Increased time for full ROM  Right Hand AROM (degrees)  Right Hand AROM: WFL    Strength:  LUE Strength  Gross LUE Strength: Exceptions to [facility]  L Hand General: 3+/5  LUE Strength Comment: 3+/5 all planes  RUE Strength  Gross RUE Strength: Exceptions to [facility]  R Hand General: 3+/5  RUE Strength Comment: 3+/5 all planes    Coordination, Tone, Quality of Movement: Tone RUE  RUE Tone: Hypertonic  Tone LUE  LUE Tone: Hypertonic  Coordination  Movements Are Fluid And Coordinated: No  Coordination and Movement description: Fine motor impairments, Gross motor impairments, Ataxia, Tremors, Decreased speed, Decreased accuracy, Right UE, Left UE  Quality of Movement Other  Comment: Rigidity with all extremities    Hand Dominance:  Hand Dominance  Hand Dominance: Right    ADL Status:  ADL  Feeding: Setup  Grooming: Setup  UE Bathing: Minimal assistance  LE Bathing: Maximum assistance  UE Dressing: Minimal assistance  LE Dressing: Maximum assistance  Toileting: Maximum assistance  Additional Comments: Simulated ADLs as above.  Unable to Educated pt. on role of acute care OT  Barriers to Learning: None    OT Follow Up:  OT D/C RECOMMENDATIONS  REQUIRES OT FOLLOW UP: Yes       Assessment/Discharge Disposition:  Assessment: Pt. is an 80year old man from home with spouse who presents to [facility] with the above deficits which impact his ability to perform ADLs and IADLs. Pt. would benefit from continued OT to maximize independence and safety with ADL tasks and decrease caregiver burden.    Performance deficits / Impairments: Decreased ADL status, Decreased functional mobility , Decreased strength, Decreased endurance, Decreased balance, Decreased high-level IADLs, Decreased fine motor control, Decreased coordination, Decreased safe awareness, Decreased cognition  Prognosis: Good  Discharge Recommendations: Continue to assess pending progress  Decision Making: High Complexity  History: Pt's medical history is complex  Exam: Pt. has 10 performance deficits  Assistance / Modification: Pt. requires max A    Six Click Score   How much help for putting on and taking off regular lower body clothing?: Total  How much help for Bathing?: A Lot  How much help for Toileting?: A Lot  How much help for putting on and taking off regular upper body clothing?: A Lot  How much help for taking care of personal grooming?: A Little  How much help for eating meals?: A Little  AM-[facility] Inpatient Daily Activity Raw Score: 13  AM-[facility] Inpatient ADL T-Scale Score : 32.03  ADL Inpatient CMS 0-100% Score: 63.03    Plan:  Plan  Times per week: 1-3x  Plan weeks: Length of acute stay  Current Treatment Recommendations: Strengthening, Balance Training, Functional Mobility Training, Endurance Training, Patient/Caregiver Education & Training, Equipment Evaluation, Education, & procurement, Safety Education & Training, Self-Care / ADL, Home Management Training, Positioning, Neuromuscular Re-education, Cognitive/Perceptual Training    Goals:   Patient will:    - Improve functional endurance to

## 2020-03-02 NOTE — PROGRESS NOTES
sidelying in bed, awakend easily and agreeable to PT eval    Restrictions:  Restrictions/Precautions: Fall Risk     SUBJECTIVE: Subjective: Per family reports, pt at times gets agitated and often does not participate in mobility or self-care at home.     Pain  Pre Treatment Pain Screening  Pain at present: 4  Intervention List: Patient able to continue with treatment;Patient declined any intervention  Comments / Details: assisted with positioning for back pain relief    Post Treatment Pain Screening:   Pain Screening  Patient Currently in Pain: Yes  Pain Assessment  Pain Level: 4  Pain Type: Chronic pain  Pain Location: Back    Prior Level of Function:  Social/Functional History  Lives With: Spouse  Type of Home: House  Home Layout: One level  Home Access: Stairs to enter without rails  Entrance Stairs - Number of Steps: 1  Bathroom Shower/Tub: Walk-in shower  Bathroom Equipment: Grab bars in shower, Shower chair  Home Equipment: Rolling walker, 4 wheeled walker  ADL Assistance: Needs assistance(per spouse, pt is capable of toileting and dressing but often gets angry if spouse doesn't assist, and he often ignores self care/bathing)  Homemaking Assistance: Needs assistance  Homemaking Responsibilities: No  Ambulation Assistance: Independent  Transfer Assistance: Independent  Active : No(has dtr's for driving)  Additional Comments: spouse reports she is not physically capable of assisting pt at the level he needs anymore; however, he has had a steady decline in functional mobility and falls    OBJECTIVE:        Cognition:  Follows Commands: Within Functional Limits    Observation/Palpation  Posture: Fair(kyphotic, rigid trunk)    ROM:  RLE PROM: WFL  RLE General PROM: tightness into knee extension  LLE PROM: WFL  LLE General PROM: tightness into knee extension    Strength:  Strength Other  Other: observed >/= 3+/5    Neuro:  Balance  Sitting - Static: Poor(consistent lean posterior or to R requiring assist to Strengthening, Functional Mobility Training, Neuromuscular Re-education, Home Exercise Program, Equipment Evaluation, Education, & procurement, Transfer Training, ROM, Gait Training, Safety Education & Training, Modalities, Balance Training, Endurance Training, Stair training, Patient/Caregiver Education & Training, Positioning, Manual Therapy - Soft Tissue Mobilization  Safety Devices  Type of devices: All fall risk precautions in place    Goals:  Patient goals : agreeable to PT POC  Long term goals  Long term goal 1: pt to be SBA with bed mobility   Long term goal 2: pt to be SBA with transfers with <25% VCs for safety  Long term goal 3: pt to ambulate >50 ft with LRAD with SBA  Long term goal 4: pt to navigate 1 curb step with CGA    AMPAC (6 CLICK) BASIC MOBILITY  AM-PAC Inpatient Mobility Raw Score : 11     Therapy Time:   Individual   Time In 1208   Time Out 1220   Minutes 12           Yuliya Garcia [place], 03/02/20 at 12:41 PM         Definitions for assistance levels  Independent = pt does not require any physical supervision or assistance from another person for activity completion. Device may be needed.   Stand by assistance = pt requires verbal cues or instructions from another person, close to but not touching, to perform the activity  Minimal assistance= pt performs 75% or more of the activity; assistance is required to complete the activity  Moderate assistance= pt performs 50% of the activity; assistance is required to complete the activity  Maximal assistance = pt performs 25% of the activity; assistance is required to complete the activity  Dependent = pt requires total physical assistance to accomplish the task

## 2020-03-03 PROBLEM — Z98.61 HISTORY OF PTCA: Status: ACTIVE | Noted: 2020-03-03

## 2020-03-03 PROBLEM — F32.A DEPRESSION: Status: ACTIVE | Noted: 2020-03-03

## 2020-03-03 PROBLEM — I25.10 CAD (CORONARY ARTERY DISEASE): Status: ACTIVE | Noted: 2020-03-03

## 2020-03-03 PROBLEM — M54.9 CHRONIC BACK PAIN: Status: ACTIVE | Noted: 2020-03-03

## 2020-03-03 PROBLEM — F41.9 ANXIETY: Status: ACTIVE | Noted: 2020-03-03

## 2020-03-03 PROBLEM — G89.29 CHRONIC BACK PAIN: Status: ACTIVE | Noted: 2020-03-03

## 2020-03-03 PROBLEM — F03.90 DEMENTIA (HCC): Status: ACTIVE | Noted: 2020-03-03

## 2020-03-03 PROBLEM — R42 VERTIGO: Status: ACTIVE | Noted: 2020-03-03

## 2020-03-03 PROBLEM — E03.9 HYPOTHYROID: Status: ACTIVE | Noted: 2020-03-03

## 2020-03-03 LAB
GLUCOSE BLD-MCNC: 128 MG/DL (ref 60–115)
GLUCOSE BLD-MCNC: 139 MG/DL (ref 60–115)
GLUCOSE BLD-MCNC: 200 MG/DL (ref 60–115)
GLUCOSE BLD-MCNC: 226 MG/DL (ref 60–115)
PERFORMED ON: ABNORMAL
URINE CULTURE, ROUTINE: NORMAL

## 2020-03-03 PROCEDURE — 97129 THER IVNTJ 1ST 15 MIN: CPT

## 2020-03-03 PROCEDURE — 97112 NEUROMUSCULAR REEDUCATION: CPT

## 2020-03-03 PROCEDURE — 92523 SPEECH SOUND LANG COMPREHEN: CPT

## 2020-03-03 PROCEDURE — 1180000000 HC REHAB R&B

## 2020-03-03 PROCEDURE — 99222 1ST HOSP IP/OBS MODERATE 55: CPT | Performed by: PHYSICAL MEDICINE & REHABILITATION

## 2020-03-03 PROCEDURE — 97530 THERAPEUTIC ACTIVITIES: CPT

## 2020-03-03 PROCEDURE — 97162 PT EVAL MOD COMPLEX 30 MIN: CPT

## 2020-03-03 PROCEDURE — 97116 GAIT TRAINING THERAPY: CPT

## 2020-03-03 PROCEDURE — 97140 MANUAL THERAPY 1/> REGIONS: CPT

## 2020-03-03 PROCEDURE — 6360000002 HC RX W HCPCS: Performed by: PHYSICAL MEDICINE & REHABILITATION

## 2020-03-03 PROCEDURE — 97535 SELF CARE MNGMENT TRAINING: CPT

## 2020-03-03 PROCEDURE — 6370000000 HC RX 637 (ALT 250 FOR IP): Performed by: PHYSICAL MEDICINE & REHABILITATION

## 2020-03-03 PROCEDURE — 97166 OT EVAL MOD COMPLEX 45 MIN: CPT

## 2020-03-03 RX ADMIN — ROSUVASTATIN CALCIUM 10 MG: 5 TABLET, COATED ORAL at 20:07

## 2020-03-03 RX ADMIN — METOPROLOL TARTRATE 50 MG: 50 TABLET, FILM COATED ORAL at 20:07

## 2020-03-03 RX ADMIN — LISINOPRIL 20 MG: 20 TABLET ORAL at 08:49

## 2020-03-03 RX ADMIN — AMLODIPINE BESYLATE 5 MG: 5 TABLET ORAL at 08:48

## 2020-03-03 RX ADMIN — SELEGILINE HYDROCHLORIDE 5 MG: 5 CAPSULE ORAL at 08:40

## 2020-03-03 RX ADMIN — PANTOPRAZOLE SODIUM 40 MG: 40 TABLET, DELAYED RELEASE ORAL at 05:46

## 2020-03-03 RX ADMIN — LEVOTHYROXINE SODIUM 25 MCG: 25 TABLET ORAL at 05:46

## 2020-03-03 RX ADMIN — LISINOPRIL 20 MG: 20 TABLET ORAL at 17:32

## 2020-03-03 RX ADMIN — METFORMIN HYDROCHLORIDE 500 MG: 500 TABLET ORAL at 08:41

## 2020-03-03 RX ADMIN — ASPIRIN 81 MG 81 MG: 81 TABLET ORAL at 20:07

## 2020-03-03 RX ADMIN — METOPROLOL TARTRATE 50 MG: 50 TABLET, FILM COATED ORAL at 08:47

## 2020-03-03 RX ADMIN — CARBIDOPA AND LEVODOPA 1 TABLET: 25; 100 TABLET ORAL at 20:07

## 2020-03-03 RX ADMIN — CLOPIDOGREL BISULFATE 75 MG: 75 TABLET ORAL at 08:40

## 2020-03-03 RX ADMIN — ENOXAPARIN SODIUM 40 MG: 40 INJECTION SUBCUTANEOUS at 08:49

## 2020-03-03 RX ADMIN — AMANTADINE HYDROCHLORIDE 100 MG: 100 CAPSULE ORAL at 08:47

## 2020-03-03 RX ADMIN — ACETAMINOPHEN 650 MG: 325 TABLET ORAL at 20:10

## 2020-03-03 RX ADMIN — CARBIDOPA AND LEVODOPA 1 TABLET: 25; 100 TABLET ORAL at 17:32

## 2020-03-03 RX ADMIN — METFORMIN HYDROCHLORIDE 500 MG: 500 TABLET ORAL at 20:07

## 2020-03-03 RX ADMIN — ROPINIROLE HYDROCHLORIDE 2 MG: 1 TABLET, FILM COATED ORAL at 20:07

## 2020-03-03 RX ADMIN — CARBIDOPA AND LEVODOPA 1 TABLET: 25; 100 TABLET ORAL at 12:52

## 2020-03-03 RX ADMIN — LATANOPROST 1 DROP: 50 SOLUTION OPHTHALMIC at 20:07

## 2020-03-03 RX ADMIN — SERTRALINE HYDROCHLORIDE 25 MG: 25 TABLET ORAL at 08:41

## 2020-03-03 RX ADMIN — CARBIDOPA AND LEVODOPA 1 TABLET: 25; 100 TABLET ORAL at 08:41

## 2020-03-03 SDOH — SOCIAL STABILITY: SOCIAL NETWORK: HOW OFTEN DO YOU ATTEND CHURCH OR RELIGIOUS SERVICES?: 1 TO 4 TIMES PER YEAR

## 2020-03-03 SDOH — ECONOMIC STABILITY: TRANSPORTATION INSECURITY
IN THE PAST 12 MONTHS, HAS THE LACK OF TRANSPORTATION KEPT YOU FROM MEDICAL APPOINTMENTS OR FROM GETTING MEDICATIONS?: YES

## 2020-03-03 SDOH — SOCIAL STABILITY: SOCIAL NETWORK
IN A TYPICAL WEEK, HOW MANY TIMES DO YOU TALK ON THE PHONE WITH FAMILY, FRIENDS, OR NEIGHBORS?: MORE THAN THREE TIMES A WEEK

## 2020-03-03 SDOH — SOCIAL STABILITY: SOCIAL NETWORK: ARE YOU MARRIED, WIDOWED, DIVORCED, SEPARATED, NEVER MARRIED, OR LIVING WITH A PARTNER?: MARRIED

## 2020-03-03 SDOH — ECONOMIC STABILITY: INCOME INSECURITY: HOW HARD IS IT FOR YOU TO PAY FOR THE VERY BASICS LIKE FOOD, HOUSING, MEDICAL CARE, AND HEATING?: NOT VERY HARD

## 2020-03-03 SDOH — SOCIAL STABILITY: SOCIAL INSECURITY
WITHIN THE LAST YEAR, HAVE YOU BEEN KICKED, HIT, SLAPPED, OR OTHERWISE PHYSICALLY HURT BY YOUR PARTNER OR EX-PARTNER?: NO

## 2020-03-03 SDOH — SOCIAL STABILITY: SOCIAL INSECURITY
WITHIN THE LAST YEAR, HAVE TO BEEN RAPED OR FORCED TO HAVE ANY KIND OF SEXUAL ACTIVITY BY YOUR PARTNER OR EX-PARTNER?: NO

## 2020-03-03 SDOH — HEALTH STABILITY: PHYSICAL HEALTH: ON AVERAGE, HOW MANY DAYS PER WEEK DO YOU ENGAGE IN MODERATE TO STRENUOUS EXERCISE (LIKE A BRISK WALK)?: 0 DAYS

## 2020-03-03 SDOH — SOCIAL STABILITY: SOCIAL INSECURITY: WITHIN THE LAST YEAR, HAVE YOU BEEN HUMILIATED OR EMOTIONALLY ABUSED IN OTHER WAYS BY YOUR PARTNER OR EX-PARTNER?: NO

## 2020-03-03 SDOH — SOCIAL STABILITY: SOCIAL NETWORK: HOW OFTEN DO YOU GET TOGETHER WITH FRIENDS OR RELATIVES?: ONCE A WEEK

## 2020-03-03 SDOH — SOCIAL STABILITY: SOCIAL NETWORK
DO YOU BELONG TO ANY CLUBS OR ORGANIZATIONS SUCH AS CHURCH GROUPS UNIONS, FRATERNAL OR ATHLETIC GROUPS, OR SCHOOL GROUPS?: NO

## 2020-03-03 SDOH — SOCIAL STABILITY: SOCIAL NETWORK: HOW OFTEN DO YOU ATTENT MEETINGS OF THE CLUB OR ORGANIZATION YOU BELONG TO?: NEVER

## 2020-03-03 SDOH — SOCIAL STABILITY: SOCIAL INSECURITY: WITHIN THE LAST YEAR, HAVE YOU BEEN AFRAID OF YOUR PARTNER OR EX-PARTNER?: NO

## 2020-03-03 SDOH — ECONOMIC STABILITY: FOOD INSECURITY: WITHIN THE PAST 12 MONTHS, YOU WORRIED THAT YOUR FOOD WOULD RUN OUT BEFORE YOU GOT MONEY TO BUY MORE.: NEVER TRUE

## 2020-03-03 SDOH — ECONOMIC STABILITY: FOOD INSECURITY: WITHIN THE PAST 12 MONTHS, THE FOOD YOU BOUGHT JUST DIDN'T LAST AND YOU DIDN'T HAVE MONEY TO GET MORE.: NEVER TRUE

## 2020-03-03 SDOH — HEALTH STABILITY: MENTAL HEALTH
STRESS IS WHEN SOMEONE FEELS TENSE, NERVOUS, ANXIOUS, OR CAN'T SLEEP AT NIGHT BECAUSE THEIR MIND IS TROUBLED. HOW STRESSED ARE YOU?: ONLY A LITTLE

## 2020-03-03 SDOH — ECONOMIC STABILITY: TRANSPORTATION INSECURITY
IN THE PAST 12 MONTHS, HAS LACK OF TRANSPORTATION KEPT YOU FROM MEETINGS, WORK, OR FROM GETTING THINGS NEEDED FOR DAILY LIVING?: YES

## 2020-03-03 SDOH — HEALTH STABILITY: PHYSICAL HEALTH: ON AVERAGE, HOW MANY MINUTES DO YOU ENGAGE IN EXERCISE AT THIS LEVEL?: 0 MIN

## 2020-03-03 ASSESSMENT — ENCOUNTER SYMPTOMS
DIARRHEA: 0
SHORTNESS OF BREATH: 1
EYE PAIN: 0
WHEEZING: 0
EYE REDNESS: 0
SORE THROAT: 0
PHOTOPHOBIA: 0
BLOOD IN STOOL: 0
NAUSEA: 0
CONSTIPATION: 1
BOWEL INCONTINENCE: 1
VOMITING: 0
COUGH: 0
BACK PAIN: 1
VISUAL CHANGE: 0
STRIDOR: 0
ABDOMINAL PAIN: 0

## 2020-03-03 ASSESSMENT — PAIN SCALES - GENERAL
PAINLEVEL_OUTOF10: 0
PAINLEVEL_OUTOF10: 6

## 2020-03-03 ASSESSMENT — PAIN DESCRIPTION - PAIN TYPE: TYPE: CHRONIC PAIN

## 2020-03-03 ASSESSMENT — PAIN DESCRIPTION - LOCATION: LOCATION: BACK

## 2020-03-03 NOTE — H&P
HISTORY & PHYSICAL        DATE OF ADMISSION:  3/2/2020    DATE OF SERVICE:  3/3/20    Subjective:    Paula Carbajal, 80 y.o. male presents today with:     CHIEF COMPLAINT:   80year old male who resented to the ER for evaluation of altered mental status for the last 4 days. CT Head 03/02/20 revealed bi acute intracranial process. CT Cervical Spine 03/02/20 showed no acute fractures. Family had noted some increasing agitation, aggression, and hostility and this is particularly worse at night. Long standing history of parkinson's dementia sent to the ER by PCP because of increased falls and behavioral changes. Recently he also complained of low back pain which is chronic. Has history of degenerative disc disease. The patient has been found to have severe abnormality of gait and mobility with impaired self care due to Impaired Mobility secondary to Exacerbation of Parkinson's and is admitted to the acute inpatient rehab program.     Transcribed from pre-admission information sheet completed by Ovi Goetz RN/mdl as directed by Dr. Jennifer Lopez. Neurologic Problem   The patient's primary symptoms include an altered mental status, clumsiness, a loss of balance, memory loss, near-syncope, slurred speech and weakness. The patient's pertinent negatives include no focal sensory loss, focal weakness, syncope or visual change. This is a recurrent problem. The current episode started in the past 7 days. The neurological problem developed insidiously. The problem is unchanged. There was no focality noted. Associated symptoms include back pain, bladder incontinence, bowel incontinence, confusion, dizziness, fatigue, headaches, light-headedness and shortness of breath. Pertinent negatives include no abdominal pain, auditory change, aura, chest pain, diaphoresis, fever, nausea, neck pain, palpitations, vertigo or vomiting. Past treatments include walking, acetaminophen, aspirin and medication.  The treatment provided mild relief. His past medical history is significant for dementia and mood changes. There is no history of a bleeding disorder, a clotting disorder, a CVA, head trauma or liver disease. Fatigue   This is a recurrent problem. The current episode started in the past 7 days. The problem occurs constantly. The problem has been gradually improving. Associated symptoms include fatigue, headaches, myalgias, numbness and weakness. Pertinent negatives include no abdominal pain, chest pain, chills, congestion, coughing, diaphoresis, fever, nausea, neck pain, rash, sore throat, vertigo, visual change or vomiting. The symptoms are aggravated by exertion. He has tried rest, acetaminophen, walking and relaxation for the symptoms. The treatment provided mild relief. The patient has stabilized medically andis able to participate at acute level rehab but is too medically complex for SNF due to need for therapy at the acute level with at least 15 hours a week of PT OT and cognitive and recreational therapy at an acute level with daily medical monitoring. Imaging:    Imaging and other studies reviewed and discussed with patient and staff    Ct Head    3/2/2020  EXAMINATION: CT of the brain without contrast HISTORY: Change in mental status. History of Parkinson's disease. COMPARISON: CT brain from February 13, 2014 TECHNIQUE: Multiple contiguous axial images were obtained of the brain from the skull base through the vertex. Multiplanar reformats were obtained. FINDINGS: Prominence of the sulci and ventricles compatible with mild generalized parenchymal volume loss. Gray-white matter differentiation is preserved. Areas of bilateral supratentorial white matter hypoattenuation are nonspecific but most likely related to chronic small vessel ischemic changes in a patient of this age. No left basal ganglia lacunar infarct. No acute hemorrhage or abnormal extra-axial fluid collection.  No mass effect or midline Results   Component Value Date    WBC 8.0 2020    RBC 5.08 2020    RBC 4.68 2012    HGB 14.4 2020    HCT 43.5 2020    MCV 85.6 2020    MCH 28.3 2020    MCHC 33.1 2020    RDW 15.0 2020     2020    MPV 8.8 2015     Lab Results   Component Value Date    VITD25 31.5 2014     Lab Results   Component Value Date    APPEARANCE TURBID 2012    COLORU Yellow 2020    NITRU Negative 2020    GLUCOSEU Negative 2020    GLUCOSEU NEG 2012    KETUA Negative 2020    UROBILINOGEN 1.0 2020    BILIRUBINUR Negative 2020    BILIRUBINUR NEG 2012     Lab Results   Component Value Date    PROTIME 13.2 2020    PROTIME 10.9 2012     Lab Results   Component Value Date    INR 1.0 2020         I discussed results with patient. The patient remains highly medically complex and continues to have severe problems with activities of daily living and mobility. The patient was assessed to be able to tolerate intensive rehabilitation and therefore was admitted to Rehabilitation to address these needs.         Prior Function; everyday activities:     Social History     Socioeconomic History    Marital status:      Spouse name: Not on file    Number of children: Not on file    Years of education: Not on file    Highest education level: Not on file   Occupational History    Not on file   Social Needs    Financial resource strain: Not very hard    Food insecurity:     Worry: Never true     Inability: Never true   [employer] needs:     Medical: Yes     Non-medical: Yes   Tobacco Use    Smoking status: Former Smoker     Packs/day: 1.00     Years: 25.00     Pack years: 25.00     Types: Cigarettes     Last attempt to quit: 1970     Years since quittin.2    Smokeless tobacco: Never Used   Substance and Sexual Activity    Alcohol use: Yes     Comment: 2 per week    Drug use: No    Rollator  Assistance: Minimal assistance, Contact guard assistance  Quality of Gait: NBOS with FF posture. Pt with poor initiation of forward momentum with [employer] and poor management of [employer] overall. Improves with rollator however still requires cues to clear obstacles fully to R side. Pt cued for increased step length and extended posture. Pt able to maintain corrections for aprox 25ft before requiring additional cueing. Fatigues quickly. Distance: 120ft,      FIMS:  , , Assessment: Pt presenting with impaired motor control, coordination, strength and endurance which has negatively impacted his functional mobility status and increased his risk of falls. Continued PT indicated for balance, strength and mobility training to progress functional indep and safety with decreased risk of falls in preparation to return home [facility]. Will request family training prior to DC. Occupational therapy: FIMS:   ,  , Assessment: Pt is an 81 y/o male from home with spouse who presents to [facility] with the above deficits which impact his ability to independently perform ADLs and IADLs. Pt would benefit from OT services to maximize independence and safety during self care. Min assist upper extremity care set up assist for grooming max assist for lower extremity care max assist for bathing and toileting. Speech therapy: FIMS:       Prior to admission patient was independent with all ADLs and mobilityand did not require any outside services.        Past Medical History:   Diagnosis Date    Anxiety 3/3/2020    CAD (coronary artery disease) 3/3/2020    Cancer ([facility] [address] 1996    Prostate    Chronic back pain     Depression 3/3/2020    Essential hypertension 10/14/2016    History of blood clots 02/2010    Right leg    History of kidney stones 2004    Hyperlipidemia     Hypothyroid 3/3/2020    Low back pain with sciatica     Parkinson disease ([facility] [address]     Type 2 diabetes mellitus without retinopathy ([facility] [address] 9/23/2015    Vertigo (ELDEPRYL) capsule 5 mg  5 mg Oral Daily Mila Scullin, DO   5 mg at 03/03/20 0840    sertraline (ZOLOFT) tablet 25 mg  25 mg Oral Daily Mila Scullin, DO   25 mg at 03/03/20 0841    timolol (TIMOPTIC) 0.5 % ophthalmic solution 1 drop  1 drop Both Eyes Daily Mila Scullin, DO        latanoprost (XALATAN) 0.005 % ophthalmic solution 1 drop  1 drop Both Eyes Nightly Mila Scullin, DO   1 drop at 03/02/20 2056    acetaminophen (TYLENOL) tablet 650 mg  650 mg Oral Q4H PRN Mila Scullin, DO   650 mg at 03/02/20 2102    fleet rectal enema 1 enema  1 enema Rectal Daily PRN Mila Scullin, DO        enoxaparin (LOVENOX) injection 40 mg  40 mg Subcutaneous Daily Mila Scullin, DO   40 mg at 03/03/20 0849    aspirin chewable tablet 81 mg  81 mg Oral Nightly Mila Scullin, DO   81 mg at 03/02/20 2055    glucose (GLUTOSE) 40 % oral gel 15 g  15 g Oral PRN Tania Alex, APRN - CNP        dextrose 50 % IV solution  12.5 g Intravenous PRN [place] Alex, APRN - CNP        glucagon (rDNA) injection 1 mg  1 mg Intramuscular PRN Tania Alex, APRN - CNP        dextrose 5 % solution  100 mL/hr Intravenous PRN [place] Alex, APRN - CNP        insulin lispro (HUMALOG) injection vial 0-12 Units  0-12 Units Subcutaneous TID WC Tania Alex, APRN - CNP   4 Units at 03/03/20 1253    insulin lispro (HUMALOG) injection vial 0-6 Units  0-6 Units Subcutaneous Nightly Tania Alex, APRN - CNP   2 Units at 03/02/20 2057       No Known Allergies    Social History     Socioeconomic History    Marital status:      Spouse name: Not on file    Number of children: Not on file    Years of education: Not on file    Highest education level: Not on file   Occupational History    Not on file   Social Needs    Financial resource strain: Not very hard    Food insecurity:     Worry: Never true     Inability: Never true   [email or web address] needs:     Medical: Yes     Non-medical: Yes   Tobacco Use    Smoking status: Former

## 2020-03-03 NOTE — PROGRESS NOTES
Patient code status was discussed with his nurse and family 3/2/20. This was reported in report. Call placed to Dr. Boo Riggs patient and families choice. Waiting for orders.

## 2020-03-03 NOTE — PROGRESS NOTES
[facility]   Facility/Department: [facility]  Speech Language Pathology  Initial Speech/Language/Cognitive Assessment    NAME:Jerome Go  : 10/6/1930 (80 y.o.)   MRN: [id number]  ROOM: [place] [address]  ADMISSION DATE: 3/2/2020  PATIENT DIAGNOSIS(ES): Abnormality of gait and mobility [R26.9]  No chief complaint on file. Patient Active Problem List    Diagnosis Date Noted    Vertigo 2020    Chronic back pain 2020    Hypothyroid 2020    Dementia ([facility] [address] 2020    Depression 2020    Anxiety 2020    BMI 22.0-22.9, adult 2020    CAD (coronary artery disease) 2020    History of PTCA 2020    Gait abnormality 2020    Abnormality of gait and mobility due to Impaired Mobility secondary to Exacerbation of Parkinson's.   [facility] admit 20. 2020    Parkinsonian tremor ([address] 2019    Former smoker 2018    History of prostate cancer 2018    Chest pain 2017    Impingement syndrome, shoulder 2017    Right rotator cuff tendonitis 2017    Essential hypertension 10/14/2016    Mixed hyperlipidemia 10/14/2016    Brow ptosis 2016    Type 2 diabetes mellitus without retinopathy ([address] 2015    Primary open angle glaucoma 2015    Ptosis of eyelid 2015    Parkinson's disease ([address] 2015    Abnormal posture 2015    Ataxic gait 2015    Thoracic back pain 2015    Low back pain with sciatica 2015    Lung disease 2014    Spinal stenosis of lumbar region with neurogenic claudication 09/10/2014    Low back pain 2014    Neck pain 2014    Impaired mobility and ADLs 2014    Parkinson's syndrome ([address] 2014    Vitamin D deficiency 2014    History of blood clots 2010    Cancer ([address] 1996     Past Medical History:   Diagnosis Date    Anxiety 3/3/2020    CAD (coronary artery disease) 3/3/2020    Cancer ([facility] [address] 1996    Prostate    Chronic back pain     Depression 3/3/2020    Essential hypertension 10/14/2016    History of blood clots 02/2010    Right leg    History of kidney stones 2004    Hyperlipidemia     Hypothyroid 3/3/2020    Low back pain with sciatica     Parkinson disease ([facility] [address]     Type 2 diabetes mellitus without retinopathy ([facility] [address] 9/23/2015    Vertigo      Past Surgical History:   Procedure Laterality Date    APPENDECTOMY      CORONARY ANGIOPLASTY WITH STENT PLACEMENT      EYE SURGERY      EYE SURGERY  2009    Left lower lid    LITHOTRIPSY  1/7/14    DR. CARMONA    PROSTATE SURGERY      ROTATOR CUFF REPAIR  2003    Right shoulder    UPPER GASTROINTESTINAL ENDOSCOPY         DATE ONSET: 3/2/2020    Date of Evaluation: 3/3/2020   Evaluating Therapist: Jeronimo Scott, Student SLP    Assessment:  Cognitive Diagnosis: Pt presents with moderate-severe cognitive linguistic impairment characterized by decreased attention, verbal reasoning, and orientation negatively affecting his ability to safely perform ADLs and effectively communicate with caregivers. It should be noted, pt was very lethargic this date. Further assessement of cognitive and linguistic skills is necessary next therapy sessions. New goals will be added to POC as deemed necessary. Diagnosis: Moerate-severe cognitive linguistic impairment. Recommendations:  Requires SLP Intervention: Yes  Duration/Frequency of Treatment: 2-4x/wk for LOS or until all goals met. D/C Recommendations: To be determined     Goals:  Short-term Goals  Timeframe for Short-term Goals: 2-3 weeks  Goal 1: Pt will complete concrete and abstract verbal reasoning task (convergent naming, divergent naming, responsive naming, word deduction) with 80% acc and min cues in order to effectively communicate wants and needs with caregivers. Goal 2:  In order to promote orientation secondary to cognitive deficits, pt will state the name of the facility, time (within one

## 2020-03-03 NOTE — CONSULTS
Hospital Medicine History & Physical      PCP: Gisela Corona MD    Date of Admission: 3/2/2020      Chief Complaint: Altered mental status/worsening Parkinson's syndrome      History Of Present Illness:    80 y.o. male who presented to the emergency department by his daughter with a history of Parkinson's disease which has become increased and signs and symptoms lately over the last 4 days. The daughter states that her dad has been more forgetful, increasing agitation, confused, unable to walk, unable to hold his balance, and she feels that diseases progressing. Past Medical History:          Diagnosis Date    Cancer [facility]) 1996    Prostate    Chronic back pain     Essential hypertension 10/14/2016    History of blood clots 02/2010    Right leg    History of kidney stones 2004    Hyperlipidemia     Low back pain with sciatica     [facility] [address]     Type 2 diabetes mellitus without retinopathy ([facility] [address] 9/23/2015    Vertigo        Past Surgical History:          Procedure Laterality Date    APPENDECTOMY      CORONARY ANGIOPLASTY WITH STENT PLACEMENT      EYE SURGERY      EYE SURGERY  2009    Left lower lid    LITHOTRIPSY  1/7/14    DR. CARMONA    PROSTATE SURGERY      ROTATOR CUFF REPAIR  2003    Right shoulder    UPPER GASTROINTESTINAL ENDOSCOPY         Medications Prior to Admission:      Prior to Admission medications    Medication Sig Start Date End Date Taking?  Authorizing Provider   carbidopa-levodopa (SINEMET)  MG per tablet TAKE ONE TABLET BY MOUTH FOUR TIMES A DAY 11/6/19  Yes Tam Mullen MD   pantoprazole sodium (PROTONIX) 40 MG PACK packet Take 40 mg by mouth every morning (before breakfast)   Yes Historical Provider, MD   levothyroxine (SYNTHROID) 25 MCG tablet Take 25 mcg by mouth Daily   Yes Historical Provider, MD   METFORMIN HCL PO Take 500 mg by mouth 2 times daily    Yes Historical Provider, MD   Rosuvastatin Calcium (CRESTOR PO) Take 10 mg by mouth needed for Chest pain. Historical Provider, MD       Allergies:  Patient has no known allergies. Social History:      The patient currently lives at home. TOBACCO:   reports that he quit smoking about 50 years ago. His smoking use included cigarettes. He has a 25.00 pack-year smoking history. He has never used smokeless tobacco.  ETOH:   reports current alcohol use. Family History:      Reviewed in detail and negative for DM, CAD, Cancer, CVA. Positive as follows:    No family history on file. REVIEW OF SYSTEMS:   Pertinent positives as noted in the HPI. All other systems reviewed and negative. PHYSICAL EXAM:    Vitals:    03/02/20 1717 03/02/20 1730 03/02/20 1852   BP: (!) 159/78     Pulse: 68  65   Resp: 18  18   Temp: 97.7 °F (36.5 °C)  98 °F (36.7 °C)   TempSrc: Oral  Oral   SpO2: 98%  94%   Weight:  140 lb 11.2 oz (63.8 kg)    Height:  5' 5\" (1.651 m)        General appearance:  No apparent distress, appears stated age and cooperative. HEENT:  Normal cephalic, atraumatic without obvious deformity. Pupils equal, round, and reactive to light. Extra ocular muscles intact. Conjunctivae/corneas clear. Neck: Supple, with full range of motion. No jugular venous distention. Trachea midline. Respiratory:  Normal respiratory effort. Clear to auscultation, bilaterally without Rales/Wheezes/Rhonchi. Cardiovascular:  Regular rate and rhythm with normal S1/S2 without murmurs, rubs or gallops. Abdomen: Soft, non-tender, non-distended with normal bowel sounds. Musculoskeletal: Unequal balance, frequent falls,   Skin: Skin color, texture, turgor normal.  No rashes or lesions. Neurologic:  Neurovascularly intact without any focal sensory/motor deficits. Cranial nerves: II-XII intact, grossly non-focal.  Psychiatric:  Positive for agitation and behavioral problems, confusion, forgetfulness.   Capillary Refill: Brisk,< 3 seconds   Peripheral Pulses: +2 palpable, equal bilaterally       Labs:     Recent Labs     03/02/20  0915   WBC 8.0   HGB 14.4   HCT 43.5        Recent Labs     03/02/20  0915      K 4.2      CO2 28   BUN 23   CREATININE 1.32*   CALCIUM 9.7     Recent Labs     03/02/20  0915   AST 28   ALT 7   BILITOT 0.4   ALKPHOS 56     Recent Labs     03/02/20  0915   INR 1.0     Recent Labs     03/02/20  0915   CKTOTAL 195*   TROPONINI 0.161*       Urinalysis:      Lab Results   Component Value Date    NITRU Negative 03/02/2020    WBCUA 0-2 03/02/2020    BACTERIA Negative 03/02/2020    RBCUA 3-5 03/02/2020    BLOODU Negative 03/02/2020    SPECGRAV 1.018 03/02/2020    GLUCOSEU Negative 03/02/2020    GLUCOSEU NEG 04/13/2012       Radiology:     CXR: I have reviewed the CXR with the following interpretation: Per radiologist  EKG:  I have reviewed the EKG with the following interpretation: Please review    No orders to display       ASSESSMENT:    C/Gucci Brennan [address] Problems    Diagnosis Date Noted    Abnormality of gait and mobility [R26.9] 03/02/2020    Mixed hyperlipidemia [E78.2] 10/14/2016    Essential hypertension [I10] 10/14/2016    Type 2 diabetes mellitus without retinopathy ([facility] [address] [E11.9] 09/23/2015    Parkinson's disease ([facility] [address] Yang Sill 09/02/2015    Thoracic back pain [M54.6] 03/04/2015    Low back pain with sciatica [M54.40] 03/04/2015    Spinal stenosis of lumbar region with neurogenic claudication [M48.062] 09/10/2014    Vitamin D deficiency [E55.9] 07/30/2014       PLAN:  Altered mental status, unspecified altered mental status type: Consult to neurology, neurochecks every shift and as needed, vital signs every shift and as needed, to monitor patient safety per policy. To follow recommendations of neurologist.    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 PT/OT to evaluate and treat.   Patient on safety and fall precautions. To follow recommendations of neurologist.    H/o Type 2 diabetes mellitus: To monitor patient's blood glucose more closely, POCT, SSI, low-carb diet. Educate patient of possible on proper diet and exercise. DVT Prophylaxis: Lovenox 40 mg subcutaneously daily  Diet: DIET GENERAL;  Code Status: Full Code    PT/OT Eval Status: To evaluate and treat    Dispo - To be determined       Nae Mason, FELICIANO - CNP    Thank you Norma Hickey MD for the opportunity to be involved in this patient's care. If you have any questions or concerns please feel free to contact me.

## 2020-03-03 NOTE — PROGRESS NOTES
Dependent/Total  Functional Mobility Comments: not safe at this time to ambulate far distances  Toilet Transfers  Toilet - Technique: Stand pivot  Equipment Used: Grab bars  Toilet Transfer: Moderate assistance  Toilet Transfers Comments: Pt requires verbal cues throughout transfer  [address] - Transfer From: Wheelchair  Shower - Transfer Type: To and From  Shower - Transfer To: Shower seat with back  Shower - Technique: Stand pivot  Shower Transfers: Moderate assistance  Shower Transfers Comments: VC's throughout  ADL  Feeding: Setup  Grooming: Unable to assess(comment)(pt required increased time to bathe and get dressed. PCA took over at end of treatment time to complete grooming)  UE Bathing: Moderate assistance(for thoroughness)  LE Bathing: Moderate assistance(for thoroughness)  UE Dressing: Minimal assistance(to thread arms)  LE Dressing: Dependent/Total  Toileting: Moderate assistance(pt able to pull down pants but requires assist to pull up pants)  Additional Comments: Pt requires significant increased time to complete ADL this AM and requires increased assistance for thoroughness   Tone RUE  RUE Tone: Hypotonic  Tone LUE  LUE Tone: Hypotonic  Coordination  Movements Are Fluid And Coordinated: No  Coordination and Movement description: Fine motor impairments;Gross motor impairments; Ataxia; Tremors;Decreased speed;Decreased accuracy; Right UE;Left UE  Quality of Movement Other  Comment: Rigidity with all extremities     Bed mobility  Supine to Sit: Unable to assess  Sit to Supine: Unable to assess  Comment: Pt up in wheelchair before and after ADL. Transfers  Sit to stand: Moderate assistance  Stand to sit: Minimal assistance  Vision - Basic Assessment  Prior Vision: Wears glasses only for reading  Visual History: Cataracts  Patient Visual Report: No visual complaint reported. Visual Field Cut: No  Oculo Motor Control:  WNL  Cognition  Overall Cognitive Status: Exceptions  Arousal/Alertness: Delayed responses to stimuli  Following Commands: Follows one step commands with repetition  Attention Span: Difficulty attending to directions; Difficulty dividing attention  Memory: Decreased recall of recent events;Decreased short term memory;Decreased long term memory  Safety Judgement: Decreased awareness of need for assistance;Decreased awareness of need for safety  Problem Solving: Assistance required to implement solutions;Assistance required to identify errors made;Assistance required to correct errors made;Assistance required to generate solutions  Insights: Decreased awareness of deficits  Initiation: Requires cues for some  Sequencing: Requires cues for all  Cognition Comment: Comp 4 Express 5 Social 5 Prob 3 Mem 3  Perception  Overall Perceptual Status: WFL     Sensation  Overall Sensation Status: Impaired(paresthesia in R LE at thigh and below)        LUE AROM (degrees)  LUE AROM : WFL  LUE General AROM: requires increased time to complete ROM assessment  Left Hand AROM (degrees)  Left Hand AROM: WFL  RUE AROM (degrees)  RUE AROM : WFL  RUE General AROM: requires min [place] assist to complete ROM assessment  Right Hand AROM (degrees)  Right Hand AROM: WFL  LUE Strength  Gross LUE Strength: Exceptions to [facility]  L Hand General: 3+/5  LUE Strength Comment: 3-/5 all planes  RUE Strength  Gross RUE Strength: Exceptions to [facility]  R Hand General: 3+/5  RUE Strength Comment: 3-/5 all planes     Hand Dominance  Hand Dominance: Right             Plan   Plan  Times per week: 5-7x  Plan weeks: 1.5 weeks  Current Treatment Recommendations: Strengthening, Balance Training, Functional Mobility Training, Endurance Training, Patient/Caregiver Education & Training, Equipment Evaluation, Education, & procurement, Safety Education & Training, Self-Care / ADL, Home Management Training, Positioning, Neuromuscular Re-education, Cognitive/Perceptual Training    G-Code     OutComes Score LB dressing. .        [x]  Patient's cognition will improve to safely perform ADLs:  Comprehension: Mod I  Expression:  Mod I  Social Interaction: Mod I  Problem Solving: Supervision  Memory: Supervision          Therapy Time   Individual Concurrent Group Co-treatment   Time In 0930         Time Out 1030         Minutes 60           Eval: 60 minutes     Zuleyka Galdamez OT   Electronically signed by Zuleyka Galdamez OT on 3/3/2020 at 11:44 AM

## 2020-03-03 NOTE — PROGRESS NOTES
Facility/Department: [facility]  Rehabilitation Initial Assessment: Physical Therapy  Room: R240/R240-01    NAME: Radhika Woodard  : 10/6/1930  MRN: [id number]    Date of Service: 3/3/2020    Rehab Diagnosis(es): Impaired mobility secondary to Exacerbation of Parkinson's  Patient Active Problem List    Diagnosis Date Noted    Vertigo 2020    Chronic back pain 2020    Hypothyroid 2020    Dementia ([facility] [address] 2020    Depression 2020    Anxiety 2020    BMI 22.0-22.9, adult 2020    CAD (coronary artery disease) 2020    History of PTCA 2020    Gait abnormality 2020    Abnormality of gait and mobility due to Impaired Mobility secondary to Exacerbation of Parkinson's.   [facility] admit 20. 2020    Parkinsonian tremor ([facility] [address] 2019    Former smoker 2018    History of prostate cancer 2018    Chest pain 2017    Impingement syndrome, shoulder 2017    Right rotator cuff tendonitis 2017    Essential hypertension 10/14/2016    Mixed hyperlipidemia 10/14/2016    Brow ptosis 2016    Type 2 diabetes mellitus without retinopathy ([address] 2015    Primary open angle glaucoma 2015    Ptosis of eyelid 2015    Parkinson's disease ([facility] [address] 2015    Abnormal posture 2015    Ataxic gait 2015    Thoracic back pain 2015    Low back pain with sciatica 2015    Lung disease 2014    Spinal stenosis of lumbar region with neurogenic claudication 09/10/2014    Low back pain 2014    Neck pain 2014    Impaired mobility and ADLs 2014    Parkinson's syndrome ([address] 2014    Vitamin D deficiency 2014    History of blood clots 2010    Cancer ([facility] [address] 1996       Past Medical History:   Diagnosis Date    Anxiety 3/3/2020    CAD (coronary artery disease) 3/3/2020    Cancer ([facility] [address]     Prostate    Chronic back pain     Depression in preparation to return home at [facility]. Will request family training prior to DC.      PLAN OF CARE:  Frequency: 1-2 treatment sessions per day, 5-7 days per week     Current Treatment Recommendations: Strengthening, Functional Mobility Training, Neuromuscular Re-education, Home Exercise Program, Equipment Evaluation, Education, & procurement, Transfer Training, ROM, Gait Training, Safety Education & Training, Balance Training, Endurance Training, Stair training, Patient/Caregiver Education & Training, Manual Therapy - Soft Tissue Mobilization    Patient's Goal:  Get back to what I used to do    GOALS:  Short term goals  Short term goal 1: Pt to complete HEP with verbal cues/indep  Short term goal 2: Pt to complete TUG  Long term goals  Long term goal 1: Pt to complete all bed mobility with indep  Long term goal 2: Pt to complete transfers with indep  Long term goal 3: Pt to ambulate household distances 50ft with LRD and indep; community distances of >150ft with SBA  Long term goal 4: Pt to manage curb step with LRD and SBA    ELOS:   Plan weeks: 2 weeks    Therapy Time:    Individual   Time In 0900   Time Out 0930   Minutes 30     8 Minutes(transfers 5min; 3min gait)       Shannon Ardon, PT, 03/03/20 at 12:09 PM

## 2020-03-04 LAB
GLUCOSE BLD-MCNC: 111 MG/DL (ref 60–115)
GLUCOSE BLD-MCNC: 123 MG/DL (ref 60–115)
GLUCOSE BLD-MCNC: 135 MG/DL (ref 60–115)
GLUCOSE BLD-MCNC: 153 MG/DL (ref 60–115)
PERFORMED ON: ABNORMAL
PERFORMED ON: NORMAL

## 2020-03-04 PROCEDURE — 99232 SBSQ HOSP IP/OBS MODERATE 35: CPT | Performed by: PHYSICAL MEDICINE & REHABILITATION

## 2020-03-04 PROCEDURE — 1180000000 HC REHAB R&B

## 2020-03-04 PROCEDURE — 92610 EVALUATE SWALLOWING FUNCTION: CPT

## 2020-03-04 PROCEDURE — 97116 GAIT TRAINING THERAPY: CPT

## 2020-03-04 PROCEDURE — 6360000002 HC RX W HCPCS: Performed by: PHYSICAL MEDICINE & REHABILITATION

## 2020-03-04 PROCEDURE — 97535 SELF CARE MNGMENT TRAINING: CPT

## 2020-03-04 PROCEDURE — 6370000000 HC RX 637 (ALT 250 FOR IP): Performed by: PHYSICAL MEDICINE & REHABILITATION

## 2020-03-04 PROCEDURE — 97110 THERAPEUTIC EXERCISES: CPT

## 2020-03-04 PROCEDURE — 97112 NEUROMUSCULAR REEDUCATION: CPT

## 2020-03-04 PROCEDURE — 97530 THERAPEUTIC ACTIVITIES: CPT

## 2020-03-04 PROCEDURE — 93010 ELECTROCARDIOGRAM REPORT: CPT | Performed by: INTERNAL MEDICINE

## 2020-03-04 RX ORDER — HYDROCODONE BITARTRATE AND ACETAMINOPHEN 5; 325 MG/1; MG/1
1 TABLET ORAL NIGHTLY
Status: DISCONTINUED | OUTPATIENT
Start: 2020-03-04 | End: 2020-03-13 | Stop reason: HOSPADM

## 2020-03-04 RX ORDER — UBIDECARENONE 100 MG
100 CAPSULE ORAL
Status: DISCONTINUED | OUTPATIENT
Start: 2020-03-04 | End: 2020-03-13 | Stop reason: HOSPADM

## 2020-03-04 RX ORDER — TRAMADOL HYDROCHLORIDE 50 MG/1
25 TABLET ORAL EVERY 6 HOURS PRN
Status: DISCONTINUED | OUTPATIENT
Start: 2020-03-04 | End: 2020-03-13 | Stop reason: HOSPADM

## 2020-03-04 RX ORDER — CYANOCOBALAMIN 1000 UG/ML
1000 INJECTION INTRAMUSCULAR; SUBCUTANEOUS WEEKLY
Status: DISCONTINUED | OUTPATIENT
Start: 2020-03-04 | End: 2020-03-13 | Stop reason: HOSPADM

## 2020-03-04 RX ORDER — LIDOCAINE 4 G/G
3 PATCH TOPICAL DAILY
Status: DISCONTINUED | OUTPATIENT
Start: 2020-03-04 | End: 2020-03-13 | Stop reason: HOSPADM

## 2020-03-04 RX ORDER — ANALGESIC BALM 1.74; 4.06 G/29G; G/29G
OINTMENT TOPICAL 3 TIMES DAILY
Status: DISCONTINUED | OUTPATIENT
Start: 2020-03-04 | End: 2020-03-13 | Stop reason: HOSPADM

## 2020-03-04 RX ADMIN — ASPIRIN 81 MG 81 MG: 81 TABLET ORAL at 21:11

## 2020-03-04 RX ADMIN — MENTHOL AND METHYL SALICYLATE: 7.6; 29 OINTMENT TOPICAL at 17:53

## 2020-03-04 RX ADMIN — LEVOTHYROXINE SODIUM 25 MCG: 25 TABLET ORAL at 06:20

## 2020-03-04 RX ADMIN — CARBIDOPA AND LEVODOPA 1 TABLET: 25; 100 TABLET ORAL at 13:05

## 2020-03-04 RX ADMIN — METFORMIN HYDROCHLORIDE 500 MG: 500 TABLET ORAL at 21:11

## 2020-03-04 RX ADMIN — LATANOPROST 1 DROP: 50 SOLUTION OPHTHALMIC at 21:14

## 2020-03-04 RX ADMIN — ENOXAPARIN SODIUM 40 MG: 40 INJECTION SUBCUTANEOUS at 09:30

## 2020-03-04 RX ADMIN — MENTHOL AND METHYL SALICYLATE: 7.6; 29 OINTMENT TOPICAL at 21:17

## 2020-03-04 RX ADMIN — METOPROLOL TARTRATE 50 MG: 50 TABLET, FILM COATED ORAL at 21:11

## 2020-03-04 RX ADMIN — CLOPIDOGREL BISULFATE 75 MG: 75 TABLET ORAL at 09:24

## 2020-03-04 RX ADMIN — Medication 100 MG: at 09:22

## 2020-03-04 RX ADMIN — LISINOPRIL 20 MG: 20 TABLET ORAL at 09:24

## 2020-03-04 RX ADMIN — AMLODIPINE BESYLATE 5 MG: 5 TABLET ORAL at 09:23

## 2020-03-04 RX ADMIN — CARBIDOPA AND LEVODOPA 1 TABLET: 25; 100 TABLET ORAL at 21:11

## 2020-03-04 RX ADMIN — HYDROCODONE BITARTRATE AND ACETAMINOPHEN 1 TABLET: 5; 325 TABLET ORAL at 21:11

## 2020-03-04 RX ADMIN — CYANOCOBALAMIN 1000 MCG: 1000 INJECTION, SOLUTION INTRAMUSCULAR; SUBCUTANEOUS at 09:31

## 2020-03-04 RX ADMIN — PANTOPRAZOLE SODIUM 40 MG: 40 TABLET, DELAYED RELEASE ORAL at 06:20

## 2020-03-04 RX ADMIN — AMANTADINE HYDROCHLORIDE 100 MG: 100 CAPSULE ORAL at 09:23

## 2020-03-04 RX ADMIN — CARBIDOPA AND LEVODOPA 1 TABLET: 25; 100 TABLET ORAL at 17:51

## 2020-03-04 RX ADMIN — ROSUVASTATIN CALCIUM 10 MG: 5 TABLET, COATED ORAL at 21:11

## 2020-03-04 RX ADMIN — ACETAMINOPHEN 650 MG: 325 TABLET ORAL at 09:29

## 2020-03-04 RX ADMIN — SELEGILINE HYDROCHLORIDE 5 MG: 5 CAPSULE ORAL at 09:22

## 2020-03-04 RX ADMIN — ACETAMINOPHEN 650 MG: 325 TABLET ORAL at 14:52

## 2020-03-04 RX ADMIN — SERTRALINE HYDROCHLORIDE 25 MG: 25 TABLET ORAL at 09:22

## 2020-03-04 RX ADMIN — CARBIDOPA AND LEVODOPA 1 TABLET: 25; 100 TABLET ORAL at 09:23

## 2020-03-04 RX ADMIN — HYPROMELLOSE 2910 1 DROP: 5 SOLUTION OPHTHALMIC at 17:54

## 2020-03-04 RX ADMIN — METFORMIN HYDROCHLORIDE 500 MG: 500 TABLET ORAL at 09:22

## 2020-03-04 RX ADMIN — ROPINIROLE HYDROCHLORIDE 2 MG: 1 TABLET, FILM COATED ORAL at 21:11

## 2020-03-04 RX ADMIN — METOPROLOL TARTRATE 50 MG: 50 TABLET, FILM COATED ORAL at 09:23

## 2020-03-04 RX ADMIN — Medication 100 MG: at 13:05

## 2020-03-04 RX ADMIN — LISINOPRIL 20 MG: 20 TABLET ORAL at 17:51

## 2020-03-04 ASSESSMENT — PAIN DESCRIPTION - ORIENTATION: ORIENTATION: LOWER;RIGHT;LEFT

## 2020-03-04 ASSESSMENT — PAIN SCALES - GENERAL
PAINLEVEL_OUTOF10: 4
PAINLEVEL_OUTOF10: 5
PAINLEVEL_OUTOF10: 4
PAINLEVEL_OUTOF10: 0
PAINLEVEL_OUTOF10: 5

## 2020-03-04 ASSESSMENT — PAIN DESCRIPTION - PAIN TYPE
TYPE: CHRONIC PAIN

## 2020-03-04 ASSESSMENT — PAIN DESCRIPTION - LOCATION
LOCATION: BACK;NECK
LOCATION: BACK;NECK
LOCATION: BACK;SHOULDER
LOCATION: BACK;NECK
LOCATION: BACK;NECK

## 2020-03-04 ASSESSMENT — PAIN DESCRIPTION - DESCRIPTORS
DESCRIPTORS: ACHING
DESCRIPTORS: ACHING;SORE
DESCRIPTORS: ACHING;SORE

## 2020-03-04 NOTE — PROGRESS NOTES
Subjective: The patient complains of severe  acute on chronic progressive Parkinson's disease with confusion partially relieved by Parkinson's medication titration, PT, OT, speech and language pathology, hydration and exacerbated by recent medical illness and dehydration. I am concerned about patients blood sugars may benefit from carb controlled diet progressing to ADA restriction. ROS x10: The patient also complains of severely impaired mobility and activities of daily living. Otherwise no new problems with vision, hearing, nose, mouth, throat, dermal, cardiovascular, GI, , pulmonary, musculoskeletal, psychiatric or neurological. See Rehab H&P on Rehab chart dated . Vital signs:  /70   Pulse 59   Temp 98 °F (36.7 °C) (Oral)   Resp 17   Ht 5' 5\" (1.651 m)   Wt 140 lb 11.2 oz (63.8 kg)   SpO2 95%   BMI 23.41 kg/m²   I/O:   PO/Intake:  fair PO intake, no problems observed or reported. Bowel/Bladder:  continent, no problems noted. General:  Patient is well developed, adequately nourished, non-obese and     well kempt. HEENT:    PERRLA, hearing intact to loud voice, external inspection of ear     and nose benign. Inspection of lips, tongue and gums benign  Musculoskeletal: No significant change in strength or tone. All joints stable. Inspection and palpation of digits and nails show no clubbing,       cyanosis or inflammatory conditions. Neuro/Psychiatric: Affect: flat but pleasant. Alert and oriented to person, place and     situation with min cues. No significant change in deep tendon reflexes or     sensation severe Parkinson's rigidity  Lungs:  Diminished CTA-B. Respiration effort is normal at rest.     Heart:   S1 = S2, RRR. No loud murmurs. Abdomen:  Soft, non-tender, no enlargement of liver or spleen. Extremities:  No significant lower extremity edema or tenderness.   Skin:   Intact to general survey, no visualized or palpated

## 2020-03-04 NOTE — PROGRESS NOTES
structures, Functions, Activity limitations: Decreased functional mobility ; Decreased strength;Decreased endurance;Decreased balance;Decreased coordination;Decreased ROM; Decreased sensation;Decreased safe awareness;Decreased posture  Assessment: Pt very tired and dificulty keeping awake and enguaged with therapy today. Pt unable to maintain upright sitting position on EOB without support for longer than 2-3 minutes. PLAN OF CARE/Safety:   Safety Devices  Type of devices: Call light within reach; Left in bed;Bed alarm in place      Therapy Time:   Individual   Time In 0   Time Out 1500   Minutes 30     Minutes:30      Transfer/Bed mobility training:15      Gait trainin      Neuro re education:15     Therapeutic ex:0      Nory Gutiérrez  20 at 4:37 PM

## 2020-03-04 NOTE — PROGRESS NOTES
Pt complains of 4/10 neck pain with movement. Tylenol given. Electronically signed by Laila Gutierrez LPN on 4/8/8608 at 3:73 AM    Therapy called about pt's chronic neck pain bothering him.  notified. Electronically signed by Laila Gutierrez LPN on 6/4/3027 at 5:18 PM    Pt got up on his own and fell on his buttocks about 1825. Vitals are 97.4, 61, 18, 144/72. Pt was able to get up with assistance and able to transfer to toilet.  notified. Requested an avaysis. Electronically signed by Laila Gutierrez LPN on 5/5/8818 at 4:19 PM     Pt has a small skin tear on the back of his left arm. Daughter notified, Deshaun Ross Dr notified, hospitalist notified. Electronically signed by Laila Gutierrez LPN on 9/5/3396 at 9:74 PM

## 2020-03-04 NOTE — CARE COORDINATION
Social/Functional Status:  Social/Functional History  Lives With: Spouse  Type of Home: Apartment  Home Layout: One level  Home Access: Stairs to enter without rails  Entrance Stairs - Number of Steps: 1  Bathroom Shower/Tub: Walk-in shower  Bathroom Equipment: Grab bars in shower, Shower chair  Home Equipment: Rolling walker, 4 wheeled walker(2 of each)  ADL Assistance: Needs assistance  Homemaking Assistance: Needs assistance  Homemaking Responsibilities: No  Ambulation Assistance: Independent(with rollator)  Transfer Assistance: Independent(Pt states that wife at times would assist with STS)  Active : No(daughters accessible for driving)  Additional Comments: Per previous assessment: spouse reports she is not physically capable of assisting pt at the level he needs anymore; however, he has had a steady decline in functional mobility and falls    Spoke with patients family and explained role in the team. Patients family questions answered appropriately. Explained discharge process. Patients family stated understanding. Patient lives with wife who is ill and is physically unable to take care of patient. Prior to patients admission, wife was assisting with ADLs, financials, and medications. Per family patient has some behavior issues where he places himself on the floor and yells at family. Patient has 2 daughters that live minutes away but are unable to provide 24 care. Patient has a walk in entrance. Patient has ww, rollator, and shower chair at this time. Per daughter, family is not able to financially pay for private caregivers and do not qualify for medicaid. During this visit, patient remained asleep and did not answer any questions.  Electronically signed by Deedee Simmons RN on 3/4/2020 at 3:48 PM

## 2020-03-04 NOTE — PROGRESS NOTES
Coordination  Fine Motor: Patient engaged in B FM coordination to increase I with fasteners and small objects for ADL's and IADL's. Patient able to  pennies from table top with MOD difficulty. Patient unable to correctly count pennies 2° falling asleep. Upper Extremity Function  UE AROM: Patient engaged in B UE ROM/strengthening, B FM coordination and cognition to increase I with ADL's, IADL's and transfers. Patient able to reach in lateral planes with MOD difficulty. Patient able to reach in forward planes with MAX difficulty. Patient able to  large pegs with MIN difficulty 1 at a time. Patient able to place large pegs 1 at a time in pegboard with MAX difficulty. Patient required increased time 2° falling asleep. Plan   Plan  Times per week: 5-7x  Plan weeks: 1.5 weeks  Current Treatment Recommendations: Strengthening, Balance Training, Functional Mobility Training, Endurance Training, Patient/Caregiver Education & Training, Equipment Evaluation, Education, & procurement, Safety Education & Training, Self-Care / ADL, Home Management Training, Positioning, Neuromuscular Re-education, Cognitive/Perceptual Training  G-Code     OutComes Score                                                  AM-PAC Score             Goals  Patient Goals   Patient goals :  \"To get stronger and walk better to go home\"       Therapy Time   Individual Concurrent Group Co-treatment   Time In 1400         Time Out 1430         Minutes 30             Therapeutic activities: 30 minutes       Electronically signed by VIMAL Chandler on 3/4/20 at 3:26 PM      VIMAL Chandler

## 2020-03-04 NOTE — PROGRESS NOTES
Physical Therapy Rehab Treatment Note  [facility]  [facility]       NAME: Kings Riley  : 10/6/1930 (55 y.o.)  MRN: [id number]  CODE STATUS: DNR-CCA    Date of Service: 3/4/2020  Chart Reviewed: Yes  Family / Caregiver Present: No    Restrictions:  Restrictions/Precautions: Fall Risk       SUBJECTIVE:    Pain Screening  Patient Currently in Pain: Yes       Post Treatment Pain Screening:3/10  Pain Assessment  Pain Assessment: 0-10  Pain Level: 5  Pain Type: Chronic pain  Pain Location: Back; Shoulder  Pain Orientation: Lower;Right;Left  Pain Descriptors: Aching; Sore    OBJECTIVE:               Neuromuscular Education  Neuromuscular Comments: P-ball seated rotation and lateral rolls to improve shoulder moblity and trunk motion. Transfers  Sit to Stand: Minimal Assistance  Stand to sit: Minimal Assistance  Bed to Chair: Minimal assistance  Comment: Extensive time spent on transfer trainning to improve safety and quality of transfers, with frequent VCs for technique. Ambulation  Ambulation?: Yes  Ambulation 1  Surface: level tile  Device: Rolling Walker;Rollator  Assistance: Minimal assistance;Contact guard assistance  Quality of Gait: Fwd flexed posture with NBOs and shuffling gait, VCs to stay within VALDEZ of AD, improved quality of turns, speed and change of directions with Rollator compared to RW. Distance: Multiple short trials at this time with distance not the focus. Stairs/Curb  Stairs?: No         Activity Tolerance  Activity Tolerance: Patient Tolerated treatment well    Exercises  Heelslides: x10  Hip Flexion: Supine Marching x 10  Hip Abduction: Supine H/L Hip ABD/ADD with MRE x 10   Physio/Swiss Ball: Knee to chest ball roll -Orange- x 10   Other exercises  Other exercises 1: Bridges x 10      ASSESSMENT/COMMENTS:  Body structures, Functions, Activity limitations: Decreased functional mobility ; Decreased strength;Decreased endurance;Decreased balance;Decreased coordination;Decreased ROM; Decreased sensation;Decreased safe awareness;Decreased posture  Assessment: Slow progression with trunk mobility and participation with therpay this AM tx session, pt progressed throughout tx with much more alert and active by end of tx session. PLAN OF CARE/Safety:   Safety Devices  Type of devices:  All fall risk precautions in place      Therapy Time:   Individual   Time In 1100   Time Out 1200   Minutes 60     Minutes:60      Transfer/Bed mobility training:15      Gait training:10      Neuro re education:15     Therapeutic ex:Theresa Bey  03/04/20 at 12:28 PM

## 2020-03-05 LAB
GLUCOSE BLD-MCNC: 126 MG/DL (ref 60–115)
GLUCOSE BLD-MCNC: 132 MG/DL (ref 60–115)
GLUCOSE BLD-MCNC: 173 MG/DL (ref 60–115)
PERFORMED ON: ABNORMAL

## 2020-03-05 PROCEDURE — 1180000000 HC REHAB R&B

## 2020-03-05 PROCEDURE — 97110 THERAPEUTIC EXERCISES: CPT

## 2020-03-05 PROCEDURE — 99233 SBSQ HOSP IP/OBS HIGH 50: CPT | Performed by: PHYSICAL MEDICINE & REHABILITATION

## 2020-03-05 PROCEDURE — 97130 THER IVNTJ EA ADDL 15 MIN: CPT

## 2020-03-05 PROCEDURE — 97535 SELF CARE MNGMENT TRAINING: CPT

## 2020-03-05 PROCEDURE — 97116 GAIT TRAINING THERAPY: CPT

## 2020-03-05 PROCEDURE — 6360000002 HC RX W HCPCS: Performed by: PHYSICAL MEDICINE & REHABILITATION

## 2020-03-05 PROCEDURE — 6370000000 HC RX 637 (ALT 250 FOR IP): Performed by: PHYSICAL MEDICINE & REHABILITATION

## 2020-03-05 PROCEDURE — 97129 THER IVNTJ 1ST 15 MIN: CPT

## 2020-03-05 PROCEDURE — 97530 THERAPEUTIC ACTIVITIES: CPT

## 2020-03-05 PROCEDURE — 97112 NEUROMUSCULAR REEDUCATION: CPT

## 2020-03-05 RX ADMIN — METOPROLOL TARTRATE 50 MG: 50 TABLET, FILM COATED ORAL at 22:20

## 2020-03-05 RX ADMIN — Medication 100 MG: at 12:21

## 2020-03-05 RX ADMIN — Medication 100 MG: at 09:14

## 2020-03-05 RX ADMIN — METFORMIN HYDROCHLORIDE 500 MG: 500 TABLET ORAL at 22:19

## 2020-03-05 RX ADMIN — CLOPIDOGREL BISULFATE 75 MG: 75 TABLET ORAL at 09:14

## 2020-03-05 RX ADMIN — ASPIRIN 81 MG 81 MG: 81 TABLET ORAL at 22:20

## 2020-03-05 RX ADMIN — MENTHOL AND METHYL SALICYLATE: 7.6; 29 OINTMENT TOPICAL at 22:21

## 2020-03-05 RX ADMIN — SELEGILINE HYDROCHLORIDE 5 MG: 5 CAPSULE ORAL at 09:19

## 2020-03-05 RX ADMIN — LEVOTHYROXINE SODIUM 25 MCG: 25 TABLET ORAL at 06:47

## 2020-03-05 RX ADMIN — HYDROCODONE BITARTRATE AND ACETAMINOPHEN 1 TABLET: 5; 325 TABLET ORAL at 22:19

## 2020-03-05 RX ADMIN — ROPINIROLE HYDROCHLORIDE 2 MG: 1 TABLET, FILM COATED ORAL at 22:18

## 2020-03-05 RX ADMIN — ROSUVASTATIN CALCIUM 10 MG: 5 TABLET, COATED ORAL at 22:18

## 2020-03-05 RX ADMIN — HYDROCODONE BITARTRATE AND ACETAMINOPHEN 1 TABLET: 5; 325 TABLET ORAL at 06:47

## 2020-03-05 RX ADMIN — CARBIDOPA AND LEVODOPA 1 TABLET: 25; 100 TABLET ORAL at 16:49

## 2020-03-05 RX ADMIN — LATANOPROST 1 DROP: 50 SOLUTION OPHTHALMIC at 22:21

## 2020-03-05 RX ADMIN — SERTRALINE HYDROCHLORIDE 25 MG: 25 TABLET ORAL at 09:19

## 2020-03-05 RX ADMIN — LISINOPRIL 20 MG: 20 TABLET ORAL at 16:49

## 2020-03-05 RX ADMIN — ENOXAPARIN SODIUM 40 MG: 40 INJECTION SUBCUTANEOUS at 09:16

## 2020-03-05 RX ADMIN — AMANTADINE HYDROCHLORIDE 100 MG: 100 CAPSULE ORAL at 09:14

## 2020-03-05 RX ADMIN — LISINOPRIL 20 MG: 20 TABLET ORAL at 09:14

## 2020-03-05 RX ADMIN — CARBIDOPA AND LEVODOPA 1 TABLET: 25; 100 TABLET ORAL at 22:18

## 2020-03-05 RX ADMIN — CARBIDOPA AND LEVODOPA 1 TABLET: 25; 100 TABLET ORAL at 09:14

## 2020-03-05 RX ADMIN — MENTHOL AND METHYL SALICYLATE: 7.6; 29 OINTMENT TOPICAL at 12:21

## 2020-03-05 RX ADMIN — METFORMIN HYDROCHLORIDE 500 MG: 500 TABLET ORAL at 09:14

## 2020-03-05 RX ADMIN — CARBIDOPA AND LEVODOPA 1 TABLET: 25; 100 TABLET ORAL at 12:21

## 2020-03-05 RX ADMIN — AMLODIPINE BESYLATE 5 MG: 5 TABLET ORAL at 09:14

## 2020-03-05 RX ADMIN — METOPROLOL TARTRATE 50 MG: 50 TABLET, FILM COATED ORAL at 09:15

## 2020-03-05 RX ADMIN — PANTOPRAZOLE SODIUM 40 MG: 40 TABLET, DELAYED RELEASE ORAL at 06:42

## 2020-03-05 ASSESSMENT — PAIN SCALES - GENERAL
PAINLEVEL_OUTOF10: 0
PAINLEVEL_OUTOF10: 5
PAINLEVEL_OUTOF10: 0
PAINLEVEL_OUTOF10: 6
PAINLEVEL_OUTOF10: 0

## 2020-03-05 ASSESSMENT — PAIN DESCRIPTION - PAIN TYPE: TYPE: CHRONIC PAIN

## 2020-03-05 ASSESSMENT — PAIN DESCRIPTION - LOCATION: LOCATION: NECK

## 2020-03-05 ASSESSMENT — PAIN DESCRIPTION - DESCRIPTORS: DESCRIPTORS: ACHING;SORE

## 2020-03-05 NOTE — PROGRESS NOTES
Mobility Comments: MIN A for sit -> stand, CGA for ambulation, 0 LOB  Patient left seated in bathroom chair at sink brushing teeth with Joseph Andrade RN present, all needs met. Plan   Plan  Times per week: 5-7x  Plan weeks: 1.5 weeks  Current Treatment Recommendations: Strengthening, Balance Training, Functional Mobility Training, Endurance Training, Patient/Caregiver Education & Training, Equipment Evaluation, Education, & procurement, Safety Education & Training, Self-Care / ADL, Home Management Training, Positioning, Neuromuscular Re-education, Cognitive/Perceptual Training  Plan Comment: Con't per OT POC for d/c on 3-17-20  G-Code     OutComes Score                                                  AM-PAC Score             Goals  Patient Goals   Patient goals :  \"To get stronger and walk better to go home\"       Therapy Time   Individual Concurrent Group Co-treatment   Time In 920         Time Out 1000         Minutes 40             ADL trainin minutes       Electronically signed by VIMAL Vega on 3/5/20 at 10:49 AM      VIMAL Vega

## 2020-03-05 NOTE — PROGRESS NOTES
Physical Therapy Rehab Treatment Note  Facility/Department: [facility]  Room: R240R240-01       NAME: Roseann Carter  : 10/6/1930 (27 y.o.)  MRN: [id number]  CODE STATUS: DNR-CCA    Date of Service: 3/5/2020  Chart Reviewed: Yes  Family / Caregiver Present: No    Restrictions:  Restrictions/Precautions: Fall Risk       SUBJECTIVE: Subjective: My neck is really sore today. Pain Screening  Patient Currently in Pain: Yes  Pre Treatment Pain Screening  Intervention List: Patient able to continue with treatment  Comments / Details: No pain number given at this time. Post Treatment Pain Screening:Yes   Pain Assessment  Pain Type: Chronic pain  Pain Location: Neck  Pain Descriptors: Aching; Sore    OBJECTIVE:               Neuromuscular Education  Neuromuscular Comments: Seated rotation drills performed at bed side with cones to Improve trunk roation and core activation. Bed mobility  Rolling to Left: Minimal assistance  Rolling to Right: Minimal assistance  Supine to Sit: Moderate assistance  Sit to Supine: Minimal assistance  Comment: Performed from Mat table. Transfers  Sit to Stand: Minimal Assistance; Moderate Assistance  Stand to sit: Minimal Assistance; Moderate Assistance  Bed to Chair: Moderate assistance    Ambulation  Ambulation?: Yes  Ambulation 1  Surface: carpet  Device: Rollator  Assistance: Minimal assistance  Quality of Gait: Fwd flexed posture with NBOs and shuffling gait Heavy lean on AD for support. Distance: 40ft x 2     Stairs/Curb  Stairs?: No         Activity Tolerance  Activity Tolerance: Patient limited by fatigue    Exercises  Heelslides: x10  Hip Flexion: Supine Marching x 10  Hip Abduction: Supine H/L Hip ABD/ADD with MRE x 10   Physio/Swiss Ball: Knee to chest ball roll -Orange- x 10      ASSESSMENT/COMMENTS:  Body structures, Functions, Activity limitations: Decreased functional mobility ; Decreased strength;Decreased endurance;Decreased balance;Decreased coordination;Decreased ROM;Decreased sensation;Decreased safe awareness;Decreased posture  Assessment: Pt fatigued and limited by endurance this tx session, pt with fair particpation at this time, required frequent rest breaks. PLAN OF CARE/Safety:   Safety Devices  Type of devices: All fall risk precautions in place; Telesitter in use; Chair alarm in place; Left in chair;Call light within reach(Family memeber present)      Therapy Time:   Individual   Time In 1100   Time Out 1200   Minutes 60     Minutes:60      Transfer/Bed mobility trainin      Gait training:10      Neuro re education:15     Therapeutic ex:Leena Mahmood  20 at 12:20 PM

## 2020-03-05 NOTE — PROGRESS NOTES
Pt ate less than 25% of lunch and dinner. Pt stated he has had much of an appetite even at home. Will continue to monitor.

## 2020-03-05 NOTE — PROGRESS NOTES
strength;Decreased endurance;Decreased balance;Decreased coordination;Decreased ROM; Decreased sensation;Decreased safe awareness;Decreased posture  Assessment: Pt fatigued and limited by endurance this tx session, pt with fair particpation at this time, required frequent rest breaks. PLAN OF CARE/Safety:   Safety Devices  Type of devices: All fall risk precautions in place; Telesitter in use; Chair alarm in place; Left in chair;Call light within reach(Family memeber present)      Therapy Time:   Individual   Time In 1420   Time Out 1455   Minutes 35   Restroom: 5 min     Minutes:30      Transfer/Bed mobility training:15      Gait training:10      Neuro re education:     Therapeutic ex:Karlo Rojas  03/05/20 at 4:11 PM

## 2020-03-05 NOTE — CARE COORDINATION
Call placed to daughter to update of discharge plan. Also, call placed to care patrol to help with HHA care due to budget issues. Care patrol will be here on 3/6.  Electronically signed by Carolyn Núñez RN on 3/5/2020 at 11:42 AM

## 2020-03-05 NOTE — PROGRESS NOTES
Occupational Therapy  Facility/Department: [facility]  Daily Treatment Note  NAME: Carol Busby  : 10/6/1930  MRN: [id number]    Date of Service: 3/5/2020    Discharge Recommendations:  Continue to assess pending progress       Assessment      Activity Tolerance  Activity Tolerance: Patient limited by fatigue;Treatment limited secondary to decreased cognition  Safety Devices  Safety Devices in place: Yes  Type of devices: All fall risk precautions in place         Patient Diagnosis(es): There were no encounter diagnoses. has a past medical history of Anxiety, CAD (coronary artery disease), Cancer ([address] Chronic back pain, Depression, Essential hypertension, History of blood clots, History of kidney stones, Hyperlipidemia, Hypothyroid, Low back pain with sciatica, Parkinson disease ([address] Type 2 diabetes mellitus without retinopathy ([facility] [address] and Vertigo. has a past surgical history that includes Lithotripsy (14); Appendectomy; eye surgery; Prostate surgery; Upper gastrointestinal endoscopy; Rotator cuff repair (); Coronary angioplasty with stent; and Eye surgery (). Restrictions  Restrictions/Precautions  Restrictions/Precautions: Fall Risk     Subjective Patient sleepy and not speaking loudly or clearly enough for therapist to understand. General  Chart Reviewed: Yes  Patient assessed for rehabilitation services?: Yes  Referring Practitioner: Dr. Nasrin Greenwood  Diagnosis: Impaired mobility 2º to exacerbation of parkinson's  Pain Assessment  Pain Level: 0  Pre Treatment Pain Screening  Pain at present: 0     Orientation  Orientation  Orientation Level: Disoriented to time;Oriented to person;Oriented to place;Oriented to situation     Objective      Patient engaged in cognitive activity. Patient provided with 12 cards each with 1 month of the year written on it. Patient instructed to sort cards into sequential order. Patient able to sort 12/12 months correctly.    Patient with MAX difficulty placing sorted cards in rows. Patient provided with 10 cards with 1 of 10 different holidays written on each card. Patient instructed to place the holiday card on the corresponding month card. Patient able to correctly match 8/10 holidays to the corresponding month. Patient required increased time 2° decreased speed and processing. Patient engaged in therapeutic activity to increase B FM coordination/strengthening, B UE ROM/strengthening and cognition to increase I with ADL's, IADL's and transfers. Patient able to reach in lateral planes with MIN difficulty. Patient able to reach in forward planes with MOD difficulty to assemble blocks and bolts design. Patient able to assemble 21 piece blocks and bolts design following visual design. Patient with MOD difficulty inserting bolts into blocks. Patient with MAX difficulty threading wing nuts onto bolts requiring therapist to start initial threading. Patient noted to require step by step verbal cues for correct assembly. Patient required increased time to complete activity 2° decreased speed and difficulty. Plan   Plan  Times per week: 5-7x  Plan weeks: 1.5 weeks  Current Treatment Recommendations: Strengthening, Balance Training, Functional Mobility Training, Endurance Training, Patient/Caregiver Education & Training, Equipment Evaluation, Education, & procurement, Safety Education & Training, Self-Care / ADL, Home Management Training, Positioning, Neuromuscular Re-education, Cognitive/Perceptual Training  Plan Comment: Con't per OT POC for d/c on 3-17-20  G-Code     OutComes Score                                                  AM-PAC Score             Goals  Patient Goals   Patient goals :  \"To get stronger and walk better to go home\"       Therapy Time   Individual Concurrent Group Co-treatment   Time In 1500         Time Out 1600         Minutes 60             Therapeutic activities: 35 minutes  Cognitive Retrainin minutes Electronically signed by VIMAL Montes on 3/5/20 at 4:17 PM      VIMAL Montes

## 2020-03-05 NOTE — PROGRESS NOTES
blood sugar checks q. before meals and at bedtime dose and titrate diabetic medications to include Humalog and Glucophage, add diabetic add dietary add and titrate insulin and carbohydrate intake add ADA restriction to diet  6. Vertigo-titrate Antivert with caution due to dry mouth  7.    Essential hypertension, Mixed hyperlipidemia-vital signs every shift dose and titrate cardiac medications to include Plavix, Norvasc, Zestril, Lopressor, Nitrostat, Crestor, aspirin    Patient and his wife wished him to be DNR CCA        Yasmine Rae D.O., PM&R     Attending    [address]

## 2020-03-05 NOTE — PLAN OF CARE
Problem: Falls - Risk of:  Goal: Will remain free from falls  Description  Will remain free from falls  3/5/2020 0144 by Elmo Menendez RN  Outcome: Ongoing  3/4/2020 1903 by Ofelia Ashraf RN  Outcome: Not Met This Shift  Goal: Absence of physical injury  Description  Absence of physical injury  3/5/2020 0144 by Elmo Menendez RN  Outcome: Ongoing  3/4/2020 1903 by Ofelia Ashraf RN  Outcome: Not Met This Shift

## 2020-03-06 LAB
GLUCOSE BLD-MCNC: 106 MG/DL (ref 60–115)
GLUCOSE BLD-MCNC: 121 MG/DL (ref 60–115)
GLUCOSE BLD-MCNC: 134 MG/DL (ref 60–115)
PERFORMED ON: ABNORMAL
PERFORMED ON: ABNORMAL
PERFORMED ON: NORMAL

## 2020-03-06 PROCEDURE — 6370000000 HC RX 637 (ALT 250 FOR IP): Performed by: PHYSICAL MEDICINE & REHABILITATION

## 2020-03-06 PROCEDURE — 92507 TX SP LANG VOICE COMM INDIV: CPT

## 2020-03-06 PROCEDURE — 92523 SPEECH SOUND LANG COMPREHEN: CPT

## 2020-03-06 PROCEDURE — 97116 GAIT TRAINING THERAPY: CPT

## 2020-03-06 PROCEDURE — 97535 SELF CARE MNGMENT TRAINING: CPT

## 2020-03-06 PROCEDURE — 6360000002 HC RX W HCPCS: Performed by: PHYSICAL MEDICINE & REHABILITATION

## 2020-03-06 PROCEDURE — 97530 THERAPEUTIC ACTIVITIES: CPT

## 2020-03-06 PROCEDURE — 99232 SBSQ HOSP IP/OBS MODERATE 35: CPT | Performed by: PHYSICAL MEDICINE & REHABILITATION

## 2020-03-06 PROCEDURE — 97110 THERAPEUTIC EXERCISES: CPT

## 2020-03-06 PROCEDURE — 97112 NEUROMUSCULAR REEDUCATION: CPT

## 2020-03-06 PROCEDURE — 1180000000 HC REHAB R&B

## 2020-03-06 RX ADMIN — CARBIDOPA AND LEVODOPA 1 TABLET: 25; 100 TABLET ORAL at 12:14

## 2020-03-06 RX ADMIN — LATANOPROST 1 DROP: 50 SOLUTION OPHTHALMIC at 21:48

## 2020-03-06 RX ADMIN — HYDROCODONE BITARTRATE AND ACETAMINOPHEN 1 TABLET: 5; 325 TABLET ORAL at 21:43

## 2020-03-06 RX ADMIN — CLOPIDOGREL BISULFATE 75 MG: 75 TABLET ORAL at 09:09

## 2020-03-06 RX ADMIN — AMLODIPINE BESYLATE 5 MG: 5 TABLET ORAL at 09:10

## 2020-03-06 RX ADMIN — MENTHOL AND METHYL SALICYLATE: 7.6; 29 OINTMENT TOPICAL at 16:32

## 2020-03-06 RX ADMIN — Medication 100 MG: at 09:09

## 2020-03-06 RX ADMIN — MAGNESIUM HYDROXIDE 30 ML: 400 SUSPENSION ORAL at 16:42

## 2020-03-06 RX ADMIN — ASPIRIN 81 MG 81 MG: 81 TABLET ORAL at 21:42

## 2020-03-06 RX ADMIN — ROSUVASTATIN CALCIUM 10 MG: 5 TABLET, COATED ORAL at 21:55

## 2020-03-06 RX ADMIN — METFORMIN HYDROCHLORIDE 500 MG: 500 TABLET ORAL at 09:09

## 2020-03-06 RX ADMIN — CARBIDOPA AND LEVODOPA 1 TABLET: 25; 100 TABLET ORAL at 21:42

## 2020-03-06 RX ADMIN — METOPROLOL TARTRATE 50 MG: 50 TABLET, FILM COATED ORAL at 09:10

## 2020-03-06 RX ADMIN — MENTHOL AND METHYL SALICYLATE: 7.6; 29 OINTMENT TOPICAL at 21:38

## 2020-03-06 RX ADMIN — MENTHOL AND METHYL SALICYLATE: 7.6; 29 OINTMENT TOPICAL at 09:10

## 2020-03-06 RX ADMIN — LISINOPRIL 20 MG: 20 TABLET ORAL at 09:09

## 2020-03-06 RX ADMIN — CARBIDOPA AND LEVODOPA 1 TABLET: 25; 100 TABLET ORAL at 16:31

## 2020-03-06 RX ADMIN — LISINOPRIL 20 MG: 20 TABLET ORAL at 16:31

## 2020-03-06 RX ADMIN — SERTRALINE HYDROCHLORIDE 25 MG: 25 TABLET ORAL at 09:11

## 2020-03-06 RX ADMIN — Medication 100 MG: at 12:14

## 2020-03-06 RX ADMIN — SELEGILINE HYDROCHLORIDE 5 MG: 5 CAPSULE ORAL at 09:11

## 2020-03-06 RX ADMIN — METOPROLOL TARTRATE 50 MG: 50 TABLET, FILM COATED ORAL at 21:52

## 2020-03-06 RX ADMIN — AMANTADINE HYDROCHLORIDE 100 MG: 100 CAPSULE ORAL at 09:10

## 2020-03-06 RX ADMIN — LEVOTHYROXINE SODIUM 25 MCG: 25 TABLET ORAL at 06:52

## 2020-03-06 RX ADMIN — CARBIDOPA AND LEVODOPA 1 TABLET: 25; 100 TABLET ORAL at 09:10

## 2020-03-06 RX ADMIN — PANTOPRAZOLE SODIUM 40 MG: 40 TABLET, DELAYED RELEASE ORAL at 06:52

## 2020-03-06 RX ADMIN — ROPINIROLE HYDROCHLORIDE 2 MG: 1 TABLET, FILM COATED ORAL at 21:52

## 2020-03-06 RX ADMIN — ENOXAPARIN SODIUM 40 MG: 40 INJECTION SUBCUTANEOUS at 09:10

## 2020-03-06 RX ADMIN — METFORMIN HYDROCHLORIDE 500 MG: 500 TABLET ORAL at 21:52

## 2020-03-06 ASSESSMENT — PAIN DESCRIPTION - ORIENTATION
ORIENTATION: RIGHT;LEFT
ORIENTATION: RIGHT;POSTERIOR
ORIENTATION: LEFT;RIGHT

## 2020-03-06 ASSESSMENT — PAIN DESCRIPTION - PAIN TYPE
TYPE: CHRONIC PAIN

## 2020-03-06 ASSESSMENT — PAIN SCALES - GENERAL
PAINLEVEL_OUTOF10: 5
PAINLEVEL_OUTOF10: 0
PAINLEVEL_OUTOF10: 5
PAINLEVEL_OUTOF10: 5
PAINLEVEL_OUTOF10: 3

## 2020-03-06 ASSESSMENT — PAIN DESCRIPTION - DESCRIPTORS
DESCRIPTORS: ACHING

## 2020-03-06 ASSESSMENT — PAIN DESCRIPTION - LOCATION
LOCATION: NECK;HEAD;SHOULDER
LOCATION: NECK
LOCATION: SHOULDER;NECK
LOCATION: HEAD;NECK;SHOULDER

## 2020-03-06 ASSESSMENT — PAIN SCALES - WONG BAKER: WONGBAKER_NUMERICALRESPONSE: 2

## 2020-03-06 NOTE — PROGRESS NOTES
Assume care for pt 2345 overseeing the LPN on shift. Pt has been sleeping soundly all through out the night no complications. With incontinence or pain. Call light within reach and bed alarm on. Monitoring for safety . Frequent trips for toile ting so pt doesn't get up unassisted to go to the bathroom.

## 2020-03-06 NOTE — PROGRESS NOTES
POC Glucose 106 60 - 115 mg/dl    Performed on ACCU-CHEK        Ct Head Wo  3/2/2020    CT brain from February 13, 2014 TECHNIQUE: Multiple contiguous axial images were obtained of the brain from the skull base through the vertex. Multiplanar reformats were obtained. FINDINGS: Prominence of the sulci and ventricles compatible with mild generalized parenchymal volume loss. Gray-white matter differentiation is preserved. Areas of bilateral supratentorial white matter hypoattenuation are nonspecific but most likely related to chronic small vessel ischemic changes in a patient of this age. No left basal ganglia lacunar infarct. No acute hemorrhage or abnormal extra-axial fluid collection. No mass effect or midline shift. The visualized paranasal sinuses and mastoid air cells are clear. Chronic right nasal bone deformity. Calvarium is intact. No acute intracranial process. Ct Cervical Spine  3/2/2020 There is straightening of the normal expected cervical lordosis. There is multilevel degenerative joint disease. Prevertebral  soft tissues are  unremarkable. The disk spaces are narrowed at the C3-4, C5-C6, C6-C7 disc spaces. No acute fractures or spondylo-listhesis. .     NO ACUTE FRACTURES. Xr Chest   3/2/2020  Portable chest radiograph History: Chest pain. Technique: AP portable view of the chest obtained. Comparison: Portable chest radiograph from August 11, 2017 Findings: Atherosclerotic calcification of the thoracic aorta. The cardiomediastinal silhouette is within normal limits. No pneumothorax, pleural effusion, or consolidation. Several bilateral calcified pleural plaques again identified. No acute osseous abnormality. No acute intrathoracic process. Several bilateral calcified pleural plaques again identified. Previous extensive, complex labs, notes and diagnostics reviewed and analyzed.      ALLERGIES:    Allergies as of 03/02/2020    (No Known Allergies)      (please also verify by checking MAR)      Yesterday I evaluated this patient for periodic reassessment of medical and functional status. The patient was discussed in detail at the treatment team meeting focusing on current medical issues, progress in therapies, social issues, psychological issues, barriers to progress and strategies to address these barriers, and discharge planning. See the hand written addendum to rehab progress note. The patient continues to be high risk for future disability and their medical and rehabilitation prognosis continue to be good and therefore, we will continue the patient's rehabilitation course as planned. The patient's tentative discharge date was set. Patient and family education was discussed. The patient was made aware of the team discussion regarding their progress. Discharge plans were discussed along with barriers to progress and strategies to address these barriers, patient encouraged to continue to discuss discharge plans with . Complex Physical Medicine & Rehab Issues Assess & Plan:   1. Severe abnormality of gait and mobility and impaired self-care and ADL's secondary to progressive Parkinson's disease. Functional and medical status reassessed regarding patients ability to participate in therapies and patient found to be able to participate in acute intensive comprehensive inpatient rehabilitation program including PT/OT to improve balance, ambulation, ADLs, and to improve the P/AROM. Therapeutic modifications regarding activities in therapies, place, amount of time per day and intensity of therapy made daily. In bed therapies or bedside therapies prn.   2. Bowel severe parkinsonian related constipation and Bladder dysfunction:  frequent toileting, ambulate to bathroom with assistance, check post void residuals. Check for C.difficile x1 if >2 loose stools in 24 hours, continue bowel & bladder program.  Monitor bowel and bladder function. Lactinex 2 PO every AC.

## 2020-03-06 NOTE — PLAN OF CARE
Problem: Falls - Risk of:  Goal: Will remain free from falls  Description  Will remain free from falls  Outcome: Ongoing  Goal: Absence of physical injury  Description  Absence of physical injury  Outcome: Ongoing     Problem: SAFETY  Goal: LTG - patient will adhere to hip precautions during ADL's and transfers  Outcome: Ongoing  Goal: LTG - Patient will demonstrate safety requirements appropriate to situation/environment  Outcome: Ongoing  Goal: LTG - patient will utilize safety techniques  Outcome: Ongoing  Goal: STG - patient locks brakes on wheelchair  Outcome: Ongoing  Goal: STG - Patient uses call light consistently to request assistance with transfers  Outcome: Ongoing  Goal: STG - patient uses gait belt during all transfers  Outcome: Ongoing     Problem:  Activity:  Goal: Ability to avoid complications of mobility impairment will improve  Description  Ability to avoid complications of mobility impairment will improve  Outcome: Ongoing  Goal: Range of joint motion will improve  Description  Range of joint motion will improve  Outcome: Ongoing  Goal: Ability to ambulate will improve  Description  Ability to ambulate will improve  Outcome: Ongoing  Goal: Muscle strength will improve  Description  Muscle strength will improve  Outcome: Ongoing     Problem: Health Behavior:  Goal: Identification of resources available to assist in meeting health care needs will improve  Description  Identification of resources available to assist in meeting health care needs will improve  Outcome: Ongoing     Problem: Safety:  Goal: Ability to remain free from injury will improve  Description  Ability to remain free from injury will improve  Outcome: Ongoing     Problem: Coping:  Goal: Ability to remain calm will improve  Description  Ability to remain calm will improve  Outcome: Ongoing     Problem: IP BALANCE  Goal: LTG - Patient will maintain balance to allow for safe/functional mobility  Outcome: Ongoing     Problem: IP COMMUNICATION/DYSARTHRIA  Goal: LTG - patient will improve expressive language skills to allow for communication of wants and needs in daily activities  Outcome: Ongoing  Goal: LTG - Patient will improve receptive language skills to allow for completion of daily activities  Outcome: Ongoing     Problem: Pain:  Goal: Pain level will decrease  Description  Pain level will decrease  Outcome: Ongoing  Goal: Control of acute pain  Description  Control of acute pain  Outcome: Ongoing  Goal: Control of chronic pain  Description  Control of chronic pain  Outcome: Ongoing     Problem: IP SWALLOWING  Goal: LTG - patient will tolerate the least restrictive diet consistency to allow for safe consumption of daily meals  Outcome: Ongoing

## 2020-03-06 NOTE — PROGRESS NOTES
Physical Therapy Rehab Treatment Note  Facility/Department: [facility]  [facility]       NAME: Paula Carbajal  : 10/6/1930 (80 y.o.)  MRN: [id number]  CODE STATUS: DNR-CCA    Date of Service: 3/6/2020  Chart Reviewed: Yes  Family / Caregiver Present: No    Restrictions:  Restrictions/Precautions: Fall Risk       SUBJECTIVE:    Pain Screening  Patient Currently in Pain: Yes       Post Treatment Pain Screenin/10  Pain Assessment  Pain Assessment: 0-10  Pain Level: 5  Pain Type: Chronic pain  Pain Location: Neck;Head;Shoulder  Pain Orientation: Right;Left  Pain Descriptors: Aching    OBJECTIVE:               Neuromuscular Education  Neuromuscular Comments: Seated rotation drills performed sitting on Mat table with cones to Improve trunk roation and core activation. Bed mobility  Rolling to Left: Minimal assistance  Rolling to Right: Stand by assistance  Supine to Sit: Moderate assistance  Sit to Supine: Minimal assistance  Comment: Increased time and effort to perform bed mobilty from Mat table. Transfers  Sit to Stand: Minimal Assistance; Moderate Assistance  Stand to sit: Minimal Assistance; Moderate Assistance  Bed to Chair: Maximum assistance  Stand Pivot Transfers: Maximum Assistance  Comment: High level of VCs needed to get pt to weight shift forward in order to improve quality of transfers. Ambulation  Ambulation?: Yes  Ambulation 1  Surface: carpet;level tile  Device: Rollator  Assistance: Minimal assistance  Quality of Gait: Fwd flexed posture with heavy lean on AD for support, slow shuffling gait pattern with decreased stride length, no heel strike or toe off. Gait Deviations: Slow Brenda;Decreased step length;Decreased step height;Shuffles  Distance: multiple short trials for quality of gait and approach to chair, distance not the focus at this time.                Activity Tolerance  Activity Tolerance: Patient limited by fatigue    Exercises  Heelslides: x10  Hip Flexion: Supine

## 2020-03-06 NOTE — PROGRESS NOTES
Pt resting in bed impulsive and climbing out of bed. avaysis camera in room for safety. meds given [per orders. frequent checks to room fall precautions in place.

## 2020-03-06 NOTE — PROGRESS NOTES
trainin minutes  Therapeutic activities: 35 minutes       Electronically signed by VIMAL Pastor on 3/6/20 at 4:15 PM      VIMAL Pastor

## 2020-03-06 NOTE — PROGRESS NOTES
some difficulties participating this session secondary to lethargy and hearing aids not working. ST removed the left hearing aid and spoke directly in the patient's left ear. ST does feel patient's scores were affected by patient's lethargy and difficulty hearing. Auditory comprehension: Patient followed 1-step directions with 100% accuracy, 2-step directions with 80% accuracy, and answered yes/no questions I with 86% accuracy. Higher level auditory comprehension was difficult to assess secondary to patient being hard of hearing. Verbal Expression: Patient produced speech automatics I with 100% accuracy. Patient repeated words I with 75% accuracy and high probability phrases with 62% accuracy. Naming skills were completed as follows: Responsive naming I with 85% accuracy, sentence completion 75% I, Divergent naming 33% I (paitent began to fall asleep), Descriptive naming I 40% accuracy, and category naming I with 40% accuracy. ST had to intermittently cue the patient to remain awake during the last 10 minutes of the session. As noted above ST does suspect scores were affected by the patient's lethargy and reduced ability to hear. ST was unable to assess reading comprehension and written expression. Aphasia therapy is not recommended at this time. St to continue with current POC. Treatment/Activity Tolerance:  Patient limited by fatigue    Plan:  Continue per POC    Pain:  Patient demonstrated no s/s of pain. Patient/Caregiver Education:  Patient educated on session and progression towards goals. Safety Devices:   All fall risk precautions in place and Chair alarm in place      [id number] Deepak Doyle ([facility]):    SWALLOWING  Rating:  DNT    SPOKEN LANGUAGE COMPREHENSION  Ratin    SPOKEN LANGUAGE EXPRESSION  Ratin    MOTOR SPEECH  Rating:  DNT    PROBLEM SOLVING  Rating:  DNT    MEMORY  Rating:  DNT          Therapy Time  SLP Individual Minutes  Time In: 1000  Time Out:

## 2020-03-07 LAB
BLOOD CULTURE, ROUTINE: NORMAL
CULTURE, BLOOD 2: NORMAL
GLUCOSE BLD-MCNC: 110 MG/DL (ref 60–115)
GLUCOSE BLD-MCNC: 141 MG/DL (ref 60–115)
PERFORMED ON: ABNORMAL
PERFORMED ON: NORMAL

## 2020-03-07 PROCEDURE — 6360000002 HC RX W HCPCS: Performed by: PHYSICAL MEDICINE & REHABILITATION

## 2020-03-07 PROCEDURE — 6370000000 HC RX 637 (ALT 250 FOR IP): Performed by: PHYSICAL MEDICINE & REHABILITATION

## 2020-03-07 PROCEDURE — 97535 SELF CARE MNGMENT TRAINING: CPT

## 2020-03-07 PROCEDURE — 97116 GAIT TRAINING THERAPY: CPT

## 2020-03-07 PROCEDURE — 99232 SBSQ HOSP IP/OBS MODERATE 35: CPT | Performed by: PHYSICAL MEDICINE & REHABILITATION

## 2020-03-07 PROCEDURE — 97110 THERAPEUTIC EXERCISES: CPT

## 2020-03-07 PROCEDURE — 1180000000 HC REHAB R&B

## 2020-03-07 PROCEDURE — 97530 THERAPEUTIC ACTIVITIES: CPT

## 2020-03-07 PROCEDURE — 97112 NEUROMUSCULAR REEDUCATION: CPT

## 2020-03-07 RX ADMIN — LEVOTHYROXINE SODIUM 25 MCG: 25 TABLET ORAL at 05:34

## 2020-03-07 RX ADMIN — ENOXAPARIN SODIUM 40 MG: 40 INJECTION SUBCUTANEOUS at 10:06

## 2020-03-07 RX ADMIN — CARBIDOPA AND LEVODOPA 1 TABLET: 25; 100 TABLET ORAL at 20:34

## 2020-03-07 RX ADMIN — CLOPIDOGREL BISULFATE 75 MG: 75 TABLET ORAL at 10:05

## 2020-03-07 RX ADMIN — Medication 100 MG: at 15:04

## 2020-03-07 RX ADMIN — CARBIDOPA AND LEVODOPA 1 TABLET: 25; 100 TABLET ORAL at 10:04

## 2020-03-07 RX ADMIN — METOPROLOL TARTRATE 50 MG: 50 TABLET, FILM COATED ORAL at 20:32

## 2020-03-07 RX ADMIN — CARBIDOPA AND LEVODOPA 1 TABLET: 25; 100 TABLET ORAL at 15:04

## 2020-03-07 RX ADMIN — SERTRALINE HYDROCHLORIDE 25 MG: 25 TABLET ORAL at 10:05

## 2020-03-07 RX ADMIN — SELEGILINE HYDROCHLORIDE 5 MG: 5 CAPSULE ORAL at 10:04

## 2020-03-07 RX ADMIN — METFORMIN HYDROCHLORIDE 500 MG: 500 TABLET ORAL at 20:34

## 2020-03-07 RX ADMIN — MENTHOL AND METHYL SALICYLATE: 7.6; 29 OINTMENT TOPICAL at 15:04

## 2020-03-07 RX ADMIN — CARBIDOPA AND LEVODOPA 1 TABLET: 25; 100 TABLET ORAL at 18:29

## 2020-03-07 RX ADMIN — ASPIRIN 81 MG 81 MG: 81 TABLET ORAL at 20:34

## 2020-03-07 RX ADMIN — HYPROMELLOSE 2910 1 DROP: 5 SOLUTION OPHTHALMIC at 18:29

## 2020-03-07 RX ADMIN — ROSUVASTATIN CALCIUM 10 MG: 5 TABLET, COATED ORAL at 20:34

## 2020-03-07 RX ADMIN — METOPROLOL TARTRATE 50 MG: 50 TABLET, FILM COATED ORAL at 10:04

## 2020-03-07 RX ADMIN — METFORMIN HYDROCHLORIDE 500 MG: 500 TABLET ORAL at 10:04

## 2020-03-07 RX ADMIN — AMANTADINE HYDROCHLORIDE 100 MG: 100 CAPSULE ORAL at 10:04

## 2020-03-07 RX ADMIN — LISINOPRIL 20 MG: 20 TABLET ORAL at 18:29

## 2020-03-07 RX ADMIN — PANTOPRAZOLE SODIUM 40 MG: 40 TABLET, DELAYED RELEASE ORAL at 05:34

## 2020-03-07 RX ADMIN — LATANOPROST 1 DROP: 50 SOLUTION OPHTHALMIC at 20:34

## 2020-03-07 RX ADMIN — ROPINIROLE HYDROCHLORIDE 2 MG: 1 TABLET, FILM COATED ORAL at 20:34

## 2020-03-07 RX ADMIN — MENTHOL AND METHYL SALICYLATE: 7.6; 29 OINTMENT TOPICAL at 20:37

## 2020-03-07 RX ADMIN — LISINOPRIL 20 MG: 20 TABLET ORAL at 10:05

## 2020-03-07 RX ADMIN — AMLODIPINE BESYLATE 5 MG: 5 TABLET ORAL at 10:05

## 2020-03-07 RX ADMIN — HYDROCODONE BITARTRATE AND ACETAMINOPHEN 1 TABLET: 5; 325 TABLET ORAL at 20:34

## 2020-03-07 ASSESSMENT — PAIN DESCRIPTION - PAIN TYPE
TYPE: CHRONIC PAIN
TYPE: CHRONIC PAIN

## 2020-03-07 ASSESSMENT — PAIN DESCRIPTION - LOCATION
LOCATION: NECK;HEAD
LOCATION: NECK;HEAD

## 2020-03-07 ASSESSMENT — PAIN SCALES - GENERAL
PAINLEVEL_OUTOF10: 0
PAINLEVEL_OUTOF10: 4
PAINLEVEL_OUTOF10: 0
PAINLEVEL_OUTOF10: 2
PAINLEVEL_OUTOF10: 0

## 2020-03-07 ASSESSMENT — PAIN DESCRIPTION - DESCRIPTORS: DESCRIPTORS: ACHING

## 2020-03-07 NOTE — PROGRESS NOTES
Occupational Therapy  Facility/Department: [facility]  Daily Treatment Note  NAME: Megan Kimble  : 10/6/1930  MRN: [id number]    Date of Service: 3/7/2020    Discharge Recommendations:  Continue to assess pending progress  OT Equipment Recommendations  Other: Continue to assess    Assessment   Performance deficits / Impairments: Decreased ADL status; Decreased functional mobility ; Decreased strength;Decreased endurance;Decreased balance;Decreased high-level IADLs;Decreased fine motor control;Decreased coordination;Decreased safe awareness;Decreased cognition  Activity Tolerance  Activity Tolerance: Patient limited by fatigue  Activity Tolerance: Pt falling asleep throughout treatment session  Safety Devices  Safety Devices in place: Yes  Type of devices: All fall risk precautions in place         Patient Diagnosis(es): There were no encounter diagnoses. has a past medical history of Anxiety, CAD (coronary artery disease), Cancer ([facility] [address] Chronic back pain, Depression, Essential hypertension, History of blood clots, History of kidney stones, Hyperlipidemia, Hypothyroid, Low back pain with sciatica, Parkinson disease ([facility] [address] Type 2 diabetes mellitus without retinopathy ([facility] [address] and Vertigo. has a past surgical history that includes Lithotripsy (14); Appendectomy; eye surgery; Prostate surgery; Upper gastrointestinal endoscopy; Rotator cuff repair (); Coronary angioplasty with stent; and Eye surgery ().     Restrictions  Restrictions/Precautions  Restrictions/Precautions: Fall Risk  Subjective   General  Chart Reviewed: Yes  Patient assessed for rehabilitation services?: Yes  Referring Practitioner: Dr. Nasim Salazar  Diagnosis: Impaired mobility 2º to exacerbation of parkinson's  Pain Assessment  Pain Level: 0  Pre Treatment Pain Screening  Pain at present: 0  Scale Used: Numeric Score   Orientation     Objective        Pt completed therapeutic activities table top to increase BUE strength/endurance for functional tasks. Pt removed pegs from vertical board with corresponding rings to promote repetitive forward functional reach. Pt placed rings on horizontal dowel at lowest height. Pt required intermittent verbal cues to attend to task due to fatigue. Pt required increased time to complete task. Plan   Plan  Times per week: 5-7x  Plan weeks: 1.5 weeks  Current Treatment Recommendations: Strengthening, Balance Training, Functional Mobility Training, Endurance Training, Patient/Caregiver Education & Training, Equipment Evaluation, Education, & procurement, Safety Education & Training, Self-Care / ADL, Home Management Training, Positioning, Neuromuscular Re-education, Cognitive/Perceptual Training  Plan Comment: Con't per OT POC for d/c on 3-17-20    Goals  Patient Goals   Patient goals : \"To get stronger and walk better to go home\"       Therapy Time   Individual Concurrent Group Co-treatment   Time In 1435         Time Out 1500         Minutes 25             Pt missed 5 min due to transport error. Pt returned to room vs being brought to OT.    Therapeutic activities: 25 minutes    Radha Mora OT    Electronically signed by Radha Mora OT on 3/7/2020 at 4:16 PM

## 2020-03-08 LAB
GLUCOSE BLD-MCNC: 127 MG/DL (ref 60–115)
GLUCOSE BLD-MCNC: 130 MG/DL (ref 60–115)
PERFORMED ON: ABNORMAL
PERFORMED ON: ABNORMAL

## 2020-03-08 PROCEDURE — 6370000000 HC RX 637 (ALT 250 FOR IP): Performed by: PHYSICAL MEDICINE & REHABILITATION

## 2020-03-08 PROCEDURE — 99232 SBSQ HOSP IP/OBS MODERATE 35: CPT | Performed by: PHYSICAL MEDICINE & REHABILITATION

## 2020-03-08 PROCEDURE — 6360000002 HC RX W HCPCS: Performed by: PHYSICAL MEDICINE & REHABILITATION

## 2020-03-08 PROCEDURE — 1180000000 HC REHAB R&B

## 2020-03-08 RX ORDER — ACETAMINOPHEN 500 MG
1000 TABLET ORAL NIGHTLY
Status: DISCONTINUED | OUTPATIENT
Start: 2020-03-08 | End: 2020-03-13 | Stop reason: HOSPADM

## 2020-03-08 RX ADMIN — CARBIDOPA AND LEVODOPA 1 TABLET: 25; 100 TABLET ORAL at 20:42

## 2020-03-08 RX ADMIN — CARBIDOPA AND LEVODOPA 1 TABLET: 25; 100 TABLET ORAL at 17:07

## 2020-03-08 RX ADMIN — ROSUVASTATIN CALCIUM 10 MG: 5 TABLET, COATED ORAL at 20:40

## 2020-03-08 RX ADMIN — METFORMIN HYDROCHLORIDE 500 MG: 500 TABLET ORAL at 20:42

## 2020-03-08 RX ADMIN — METFORMIN HYDROCHLORIDE 500 MG: 500 TABLET ORAL at 09:24

## 2020-03-08 RX ADMIN — LEVOTHYROXINE SODIUM 25 MCG: 25 TABLET ORAL at 05:55

## 2020-03-08 RX ADMIN — METOPROLOL TARTRATE 50 MG: 50 TABLET, FILM COATED ORAL at 09:24

## 2020-03-08 RX ADMIN — CARBIDOPA AND LEVODOPA 1 TABLET: 25; 100 TABLET ORAL at 09:24

## 2020-03-08 RX ADMIN — MENTHOL AND METHYL SALICYLATE: 7.6; 29 OINTMENT TOPICAL at 09:25

## 2020-03-08 RX ADMIN — SERTRALINE HYDROCHLORIDE 25 MG: 25 TABLET ORAL at 09:28

## 2020-03-08 RX ADMIN — LISINOPRIL 20 MG: 20 TABLET ORAL at 17:07

## 2020-03-08 RX ADMIN — ASPIRIN 81 MG 81 MG: 81 TABLET ORAL at 20:42

## 2020-03-08 RX ADMIN — PANTOPRAZOLE SODIUM 40 MG: 40 TABLET, DELAYED RELEASE ORAL at 05:55

## 2020-03-08 RX ADMIN — HYDROCODONE BITARTRATE AND ACETAMINOPHEN 1 TABLET: 5; 325 TABLET ORAL at 20:41

## 2020-03-08 RX ADMIN — LATANOPROST 1 DROP: 50 SOLUTION OPHTHALMIC at 20:42

## 2020-03-08 RX ADMIN — Medication 100 MG: at 13:55

## 2020-03-08 RX ADMIN — ROPINIROLE HYDROCHLORIDE 2 MG: 1 TABLET, FILM COATED ORAL at 20:40

## 2020-03-08 RX ADMIN — TRAMADOL HYDROCHLORIDE 25 MG: 50 TABLET, FILM COATED ORAL at 01:00

## 2020-03-08 RX ADMIN — MENTHOL AND METHYL SALICYLATE: 7.6; 29 OINTMENT TOPICAL at 14:06

## 2020-03-08 RX ADMIN — CARBIDOPA AND LEVODOPA 1 TABLET: 25; 100 TABLET ORAL at 13:55

## 2020-03-08 RX ADMIN — SELEGILINE HYDROCHLORIDE 5 MG: 5 CAPSULE ORAL at 09:28

## 2020-03-08 RX ADMIN — HYDROCODONE BITARTRATE AND ACETAMINOPHEN 1 TABLET: 5; 325 TABLET ORAL at 05:55

## 2020-03-08 RX ADMIN — HYPROMELLOSE 2910 1 DROP: 5 SOLUTION OPHTHALMIC at 09:25

## 2020-03-08 RX ADMIN — METOPROLOL TARTRATE 50 MG: 50 TABLET, FILM COATED ORAL at 20:40

## 2020-03-08 RX ADMIN — AMLODIPINE BESYLATE 5 MG: 5 TABLET ORAL at 09:24

## 2020-03-08 RX ADMIN — AMANTADINE HYDROCHLORIDE 100 MG: 100 CAPSULE ORAL at 09:24

## 2020-03-08 RX ADMIN — Medication 100 MG: at 09:24

## 2020-03-08 RX ADMIN — ENOXAPARIN SODIUM 40 MG: 40 INJECTION SUBCUTANEOUS at 09:24

## 2020-03-08 RX ADMIN — LISINOPRIL 20 MG: 20 TABLET ORAL at 09:24

## 2020-03-08 RX ADMIN — CLOPIDOGREL BISULFATE 75 MG: 75 TABLET ORAL at 09:24

## 2020-03-08 ASSESSMENT — PAIN SCALES - GENERAL
PAINLEVEL_OUTOF10: 0
PAINLEVEL_OUTOF10: 6
PAINLEVEL_OUTOF10: 4
PAINLEVEL_OUTOF10: 0
PAINLEVEL_OUTOF10: 0

## 2020-03-08 ASSESSMENT — PAIN DESCRIPTION - LOCATION: LOCATION: NECK;HEAD

## 2020-03-08 ASSESSMENT — PAIN DESCRIPTION - PAIN TYPE: TYPE: CHRONIC PAIN

## 2020-03-08 NOTE — PLAN OF CARE
Problem: Falls - Risk of:  Goal: Will remain free from falls  Description: Will remain free from falls  Outcome: Ongoing  Goal: Absence of physical injury  Description: Absence of physical injury  Outcome: Ongoing

## 2020-03-08 NOTE — PROGRESS NOTES
past 24 hour(s))   POCT Glucose    Collection Time: 03/07/20  4:39 PM   Result Value Ref Range    POC Glucose 141 (H) 60 - 115 mg/dl    Performed on ACCU-CHEK    POCT Glucose    Collection Time: 03/08/20  6:48 AM   Result Value Ref Range    POC Glucose 127 (H) 60 - 115 mg/dl    Performed on ACCU-CHEK        Ct Head Wo  3/2/2020   FINDINGS: Prominence of the sulci and ventricles compatible with mild generalized parenchymal volume loss. Gray-white matter differentiation is preserved. Areas of bilateral supratentorial white matter hypoattenuation are nonspecific but most likely related to chronic small vessel ischemic changes in a patient of this age. No left basal ganglia lacunar infarct. No acute hemorrhage or abnormal extra-axial fluid collection. No mass effect or midline shift. The visualized paranasal sinuses and mastoid air cells are clear. Chronic right nasal bone deformity. Calvarium is intact. No acute intracranial process. Ct Cervical Spine  3/2/2020 There is straightening of the normal expected cervical lordosis. There is multilevel degenerative joint disease. Prevertebral  soft tissues are  unremarkable. The disk spaces are narrowed at the C3-4, C5-C6, C6-C7 disc spaces. No acute fractures or spondylo-listhesis. .     NO ACUTE FRACTURES. Xr Chest   3/2/2020  Portable chest radiograph History: Chest pain. Technique: AP portable view of the chest obtained. Comparison: Portable chest radiograph from August 11, 2017 Findings: Atherosclerotic calcification of the thoracic aorta. The cardiomediastinal silhouette is within normal limits. No pneumothorax, pleural effusion, or consolidation. Several bilateral calcified pleural plaques again identified. No acute osseous abnormality. No acute intrathoracic process. Several bilateral calcified pleural plaques again identified. Previous extensive, complex labs, notes and diagnostics reviewed and analyzed.      ALLERGIES:    Allergies as of 03/02/2020    (No Known Allergies)      (please also verify by checking [facility])     Complex Physical Medicine & Rehab Issues Assess & Plan:   1. Severe abnormality of gait and mobility and impaired self-care and ADL's secondary to progressive Parkinson's disease. Functional and medical status reassessed regarding patients ability to participate in therapies and patient found to be able to participate in acute intensive comprehensive inpatient rehabilitation program including PT/OT to improve balance, ambulation, ADLs, and to improve the P/AROM. Therapeutic modifications regarding activities in therapies, place, amount of time per day and intensity of therapy made daily. In bed therapies or bedside therapies prn.   2. Bowel severe parkinsonian related constipation and Bladder dysfunction:  frequent toileting, ambulate to bathroom with assistance, check post void residuals. Check for C.difficile x1 if >2 loose stools in 24 hours, continue bowel & bladder program.  Monitor bowel and bladder function. Lactinex 2 PO every AC. MOM prn, Brown Bomb prn, Glycerin suppository prn, enema prn. Add scheduled milk of magnesia consider lactulose and fleets enema. Increase fruits in diet. 3. Severe Parkinson's related muscle stiffness in neck and back pain as well as generalized OA pain: reassess pain every shift and prior to and after each therapy session, give prn Tylenol and Norco with caution, modalities prn in therapy, Lidoderm, K-pad prn.   4. Skin healing and breakdown risk: Monitor closely because of rigidity from Parkinson's disease continue side-to-side turns. Continue pressure relief program.  Daily skin exams and reports from nursing.   5. Severe fatigue due to nutritional and hydration deficiency: Titrate vitamin B12 vitamin D and CoQ10 continue to monitor I&Os, calorie counts prn, dietary consult prn.  6. Acute episodic insomnia with situational adjustment disorder: Scheduled Tylenol at at bedtime, toilet

## 2020-03-08 NOTE — PLAN OF CARE
Problem: Falls - Risk of:  Goal: Will remain free from falls  Description: Will remain free from falls  Outcome: Ongoing  Goal: Absence of physical injury  Description: Absence of physical injury  Outcome: Ongoing     Problem: SAFETY  Goal: LTG - patient will adhere to hip precautions during ADL's and transfers  Outcome: Ongoing  Goal: LTG - Patient will demonstrate safety requirements appropriate to situation/environment  Outcome: Ongoing  Goal: LTG - patient will utilize safety techniques  Outcome: Ongoing  Goal: STG - patient locks brakes on wheelchair  Outcome: Ongoing  Goal: STG - Patient uses call light consistently to request assistance with transfers  Outcome: Ongoing  Goal: STG - patient uses gait belt during all transfers  Outcome: Ongoing     Problem:  Activity:  Goal: Ability to avoid complications of mobility impairment will improve  Description: Ability to avoid complications of mobility impairment will improve  Outcome: Ongoing  Goal: Range of joint motion will improve  Description: Range of joint motion will improve  Outcome: Ongoing  Goal: Ability to ambulate will improve  Description: Ability to ambulate will improve  Outcome: Ongoing  Goal: Muscle strength will improve  Description: Muscle strength will improve  Outcome: Ongoing     Problem: Health Behavior:  Goal: Identification of resources available to assist in meeting health care needs will improve  Description: Identification of resources available to assist in meeting health care needs will improve  Outcome: Ongoing     Problem: Safety:  Goal: Ability to remain free from injury will improve  Description: Ability to remain free from injury will improve  Outcome: Ongoing     Problem: Coping:  Goal: Ability to remain calm will improve  Description: Ability to remain calm will improve  Outcome: Ongoing     Problem: IP BALANCE  Goal: LTG - Patient will maintain balance to allow for safe/functional mobility  Outcome: Ongoing     Problem: IP COMMUNICATION/DYSARTHRIA  Goal: LTG - patient will improve expressive language skills to allow for communication of wants and needs in daily activities  Outcome: Ongoing  Goal: LTG - Patient will improve receptive language skills to allow for completion of daily activities  Outcome: Ongoing     Problem: Pain:  Goal: Pain level will decrease  Description: Pain level will decrease  Outcome: Ongoing  Goal: Control of acute pain  Description: Control of acute pain  Outcome: Ongoing  Goal: Control of chronic pain  Description: Control of chronic pain  Outcome: Ongoing     Problem: IP SWALLOWING  Goal: LTG - patient will tolerate the least restrictive diet consistency to allow for safe consumption of daily meals  Outcome: Ongoing

## 2020-03-09 LAB
GLUCOSE BLD-MCNC: 106 MG/DL (ref 60–115)
GLUCOSE BLD-MCNC: 134 MG/DL (ref 60–115)
GLUCOSE BLD-MCNC: 98 MG/DL (ref 60–115)
PERFORMED ON: ABNORMAL
PERFORMED ON: NORMAL
PERFORMED ON: NORMAL

## 2020-03-09 PROCEDURE — 1180000000 HC REHAB R&B

## 2020-03-09 PROCEDURE — 6360000002 HC RX W HCPCS: Performed by: PHYSICAL MEDICINE & REHABILITATION

## 2020-03-09 PROCEDURE — 97535 SELF CARE MNGMENT TRAINING: CPT

## 2020-03-09 PROCEDURE — 6370000000 HC RX 637 (ALT 250 FOR IP): Performed by: NURSE PRACTITIONER

## 2020-03-09 PROCEDURE — 97530 THERAPEUTIC ACTIVITIES: CPT

## 2020-03-09 PROCEDURE — 99232 SBSQ HOSP IP/OBS MODERATE 35: CPT | Performed by: PHYSICAL MEDICINE & REHABILITATION

## 2020-03-09 PROCEDURE — 97129 THER IVNTJ 1ST 15 MIN: CPT

## 2020-03-09 PROCEDURE — 99222 1ST HOSP IP/OBS MODERATE 55: CPT | Performed by: PSYCHIATRY & NEUROLOGY

## 2020-03-09 PROCEDURE — 97116 GAIT TRAINING THERAPY: CPT

## 2020-03-09 PROCEDURE — 97110 THERAPEUTIC EXERCISES: CPT

## 2020-03-09 PROCEDURE — 6370000000 HC RX 637 (ALT 250 FOR IP): Performed by: PHYSICAL MEDICINE & REHABILITATION

## 2020-03-09 PROCEDURE — 97130 THER IVNTJ EA ADDL 15 MIN: CPT

## 2020-03-09 RX ORDER — QUETIAPINE FUMARATE 25 MG/1
25 TABLET, FILM COATED ORAL NIGHTLY
Status: DISCONTINUED | OUTPATIENT
Start: 2020-03-09 | End: 2020-03-13 | Stop reason: HOSPADM

## 2020-03-09 RX ORDER — LACTULOSE 10 G/15ML
20 SOLUTION ORAL 2 TIMES DAILY PRN
Status: DISCONTINUED | OUTPATIENT
Start: 2020-03-09 | End: 2020-03-13 | Stop reason: HOSPADM

## 2020-03-09 RX ADMIN — QUETIAPINE FUMARATE 25 MG: 25 TABLET ORAL at 20:48

## 2020-03-09 RX ADMIN — LISINOPRIL 20 MG: 20 TABLET ORAL at 08:09

## 2020-03-09 RX ADMIN — CARBIDOPA AND LEVODOPA 1 TABLET: 25; 100 TABLET ORAL at 12:32

## 2020-03-09 RX ADMIN — Medication 100 MG: at 12:32

## 2020-03-09 RX ADMIN — SERTRALINE HYDROCHLORIDE 25 MG: 25 TABLET ORAL at 08:09

## 2020-03-09 RX ADMIN — CARBIDOPA AND LEVODOPA 1 TABLET: 25; 100 TABLET ORAL at 08:09

## 2020-03-09 RX ADMIN — ROSUVASTATIN CALCIUM 10 MG: 5 TABLET, COATED ORAL at 20:48

## 2020-03-09 RX ADMIN — CARBIDOPA AND LEVODOPA 1 TABLET: 25; 100 TABLET ORAL at 17:04

## 2020-03-09 RX ADMIN — MAGNESIUM HYDROXIDE 30 ML: 400 SUSPENSION ORAL at 06:42

## 2020-03-09 RX ADMIN — ACETAMINOPHEN 1000 MG: 500 TABLET ORAL at 20:48

## 2020-03-09 RX ADMIN — AMANTADINE HYDROCHLORIDE 100 MG: 100 CAPSULE ORAL at 08:09

## 2020-03-09 RX ADMIN — HYPROMELLOSE 2910 1 DROP: 5 SOLUTION OPHTHALMIC at 20:48

## 2020-03-09 RX ADMIN — TRAMADOL HYDROCHLORIDE 25 MG: 50 TABLET, FILM COATED ORAL at 01:56

## 2020-03-09 RX ADMIN — METFORMIN HYDROCHLORIDE 500 MG: 500 TABLET ORAL at 20:47

## 2020-03-09 RX ADMIN — CARBIDOPA AND LEVODOPA 1 TABLET: 25; 100 TABLET ORAL at 20:48

## 2020-03-09 RX ADMIN — LISINOPRIL 20 MG: 20 TABLET ORAL at 17:05

## 2020-03-09 RX ADMIN — SELEGILINE HYDROCHLORIDE 5 MG: 5 CAPSULE ORAL at 08:09

## 2020-03-09 RX ADMIN — LATANOPROST 1 DROP: 50 SOLUTION OPHTHALMIC at 20:48

## 2020-03-09 RX ADMIN — METOPROLOL TARTRATE 50 MG: 50 TABLET, FILM COATED ORAL at 20:48

## 2020-03-09 RX ADMIN — METFORMIN HYDROCHLORIDE 500 MG: 500 TABLET ORAL at 08:09

## 2020-03-09 RX ADMIN — AMLODIPINE BESYLATE 5 MG: 5 TABLET ORAL at 08:09

## 2020-03-09 RX ADMIN — ROPINIROLE HYDROCHLORIDE 2 MG: 1 TABLET, FILM COATED ORAL at 20:47

## 2020-03-09 RX ADMIN — ASPIRIN 81 MG 81 MG: 81 TABLET ORAL at 20:48

## 2020-03-09 RX ADMIN — PANTOPRAZOLE SODIUM 40 MG: 40 TABLET, DELAYED RELEASE ORAL at 06:42

## 2020-03-09 RX ADMIN — ENOXAPARIN SODIUM 40 MG: 40 INJECTION SUBCUTANEOUS at 08:09

## 2020-03-09 RX ADMIN — ACETAMINOPHEN 1000 MG: 500 TABLET ORAL at 01:56

## 2020-03-09 RX ADMIN — MENTHOL AND METHYL SALICYLATE: 7.6; 29 OINTMENT TOPICAL at 08:10

## 2020-03-09 RX ADMIN — METOPROLOL TARTRATE 50 MG: 50 TABLET, FILM COATED ORAL at 08:09

## 2020-03-09 RX ADMIN — CLOPIDOGREL BISULFATE 75 MG: 75 TABLET ORAL at 08:09

## 2020-03-09 RX ADMIN — HYDROCODONE BITARTRATE AND ACETAMINOPHEN 1 TABLET: 5; 325 TABLET ORAL at 20:48

## 2020-03-09 RX ADMIN — MENTHOL AND METHYL SALICYLATE: 7.6; 29 OINTMENT TOPICAL at 20:49

## 2020-03-09 RX ADMIN — LEVOTHYROXINE SODIUM 25 MCG: 25 TABLET ORAL at 06:42

## 2020-03-09 RX ADMIN — Medication 100 MG: at 08:09

## 2020-03-09 ASSESSMENT — PAIN SCALES - GENERAL
PAINLEVEL_OUTOF10: 4
PAINLEVEL_OUTOF10: 2
PAINLEVEL_OUTOF10: 0
PAINLEVEL_OUTOF10: 6
PAINLEVEL_OUTOF10: 4

## 2020-03-09 ASSESSMENT — PAIN DESCRIPTION - FREQUENCY
FREQUENCY: INTERMITTENT
FREQUENCY: CONTINUOUS

## 2020-03-09 ASSESSMENT — PAIN DESCRIPTION - ORIENTATION
ORIENTATION: POSTERIOR
ORIENTATION: POSTERIOR

## 2020-03-09 ASSESSMENT — PAIN SCALES - WONG BAKER: WONGBAKER_NUMERICALRESPONSE: 2

## 2020-03-09 ASSESSMENT — PAIN DESCRIPTION - LOCATION
LOCATION: NECK

## 2020-03-09 ASSESSMENT — PAIN DESCRIPTION - ONSET: ONSET: ON-GOING

## 2020-03-09 ASSESSMENT — ENCOUNTER SYMPTOMS
VOMITING: 0
CHEST TIGHTNESS: 0
NAUSEA: 0
WHEEZING: 0
TROUBLE SWALLOWING: 0
COUGH: 0
SHORTNESS OF BREATH: 0
COLOR CHANGE: 0

## 2020-03-09 ASSESSMENT — PAIN DESCRIPTION - DESCRIPTORS
DESCRIPTORS: ACHING
DESCRIPTORS: ACHING;SHARP
DESCRIPTORS: ACHING

## 2020-03-09 ASSESSMENT — PAIN - FUNCTIONAL ASSESSMENT: PAIN_FUNCTIONAL_ASSESSMENT: ACTIVITIES ARE NOT PREVENTED

## 2020-03-09 ASSESSMENT — PAIN DESCRIPTION - PAIN TYPE
TYPE: CHRONIC PAIN
TYPE: CHRONIC PAIN

## 2020-03-09 ASSESSMENT — PAIN DESCRIPTION - PROGRESSION: CLINICAL_PROGRESSION: NOT CHANGED

## 2020-03-09 NOTE — CONSULTS
[facility] Neurology Consult Note  Name: Pippa Moore  Age: 80 y.o. Gender: male  CodeStatus: DNR-CCA  Allergies: No Known Allergies    Chief Complaint:No chief complaint on file. Primary Care Provider: José Miguel Tomlinson MD  InpatientTreatment Team: Treatment Team: Attending Provider: Bunny Martinez DO; Consulting Physician: Angela Vaughn MD; Consulting Physician: Gennaro Garcia, PhD; Registered Nurse: Kaylie Jorgensen RN; Consulting Physician: Kojo Kaufman DO; Occupational Therapist: Mulu García, OTR/L; Registered Nurse: Kingsley Strong RN; Patient Care Tech: Adan Walton  Admission Date: 3/2/2020      HPI   Pt seen and examined on rehab unit  for neurology consult. 80 yr old cauc male with PMH of vertigo, DMII, Parkinon's disease, hypothyroid, HLD, HTN, depression, prostate cancer, CAD, anxiety. Pt was sent to [facility] [place] [facility] on 3/2/20 by his PCP due to change in MS with increasing agitation and aggression x 4 days that was worse at HS. Pt with HX of parkinson's dementia. Also with multiple falls recently. Pt was last seen by Dr Radha Oviedo on 11/20/19 for Parkinson's and was doing well on carbidopa levidopa QID as well as selegiline. No med changes were needed at that time. He was noted to have excessive somnolence and depression and prozac was recommended. No Lewy Body dementia or dyskinesias at that time. Pt currently A&O x3, NAD, cooperative. Thin, frail, debilitated. Appetite poor. No focal deficits. No seizure activity. No tremors or dyskinesia. Nursing reports pt with daily hallucinations as well as agitation and behavioral disturbances at HS. NO Headache, double vision, blurry vision, difficulty with speech, difficulty with swallowing,  numbness, pain, nausea, vomiting, choking, neck pain, dizziness  Vitals:    03/09/20 0644   BP: (!) 163/75   Pulse: 64   Resp: 17   Temp: 97 °F (36.1 °C)   SpO2: 97%    Breast Cancer-related family history is not on file.   Social History     Socioeconomic History    Marital status:      Spouse name: Not on file    Number of children: Not on file    Years of education: Not on file    Highest education level: Not on file   Occupational History    Not on file   Social Needs    Financial resource strain: Not very hard    Food insecurity     Worry: Never true     Inability: Never true    Transportation needs     Medical: Yes     Non-medical: Yes   Tobacco Use    Smoking status: Former Smoker     Packs/day: 1.00     Years: 25.00     Pack years: 25.00     Types: Cigarettes     Last attempt to quit: 1970     Years since quittin.2    Smokeless tobacco: Never Used   Substance and Sexual Activity    Alcohol use: Yes     Comment: 2 per week    Drug use: No    Sexual activity: Not on file   Lifestyle    Physical activity     Days per week: 0 days     Minutes per session: 0 min    Stress:  Only a little   Relationships    Social connections     Talks on phone: More than three times a week     Gets together: Once a week     Attends Rastafarian service: 1 to 4 times per year     Active member of club or organization: No     Attends meetings of clubs or organizations: Never     Relationship status:     Intimate partner violence     Fear of current or ex partner: No     Emotionally abused: No     Physically abused: No     Forced sexual activity: No   Other Topics Concern    Not on file   Social History Narrative         Lives With: Spouse    Type of Home: House One level    Home Access: Stairs to enter without rails - Number of Steps: 1    Bathroom Shower/Tub: Walk-in shower    Bathroom Equipment: Grab bars in shower, Shower chair    Home Equipment: Rolling walker, 4 wheeled walker    ADL Assistance: Needs assistance(per spouse, pt is capable of toileting and dressing but often gets angry if spouse doesn't assist, and he often ignores self care/bathing)    Homemaking Assistance: Needs assistance    Homemaking Responsibilities: No    Ambulation

## 2020-03-09 NOTE — PROGRESS NOTES
deep tendon reflexes or     sensation severe Parkinson's rigidity  Lungs:  Diminished CTA-B. Respiration effort is normal at rest.     Heart:   S1 = S2, RRR. No loud murmurs. Abdomen:  Soft, non-tender, no enlargement of liver or spleen. Extremities:  No significant lower extremity edema or tenderness. Skin:   Intact to general survey, no visualized or palpated problems. Rehabilitation:  Physical therapy: FIMS:  Bed Mobility:      Transfers: Sit to Stand: Contact guard assistance, Stand by assistance  Stand to sit: Contact guard assistance, Stand by assistance  Bed to Chair: Maximum assistance  Squat Pivot Transfers: Maximum Assistance, Ambulation 1  Surface: carpet  Device: Rollator  Assistance: Contact guard assistance  Quality of Gait: Fed flexed posture with downward gaze, decreased stride length and foot clearance, VCs to increase stride length with good responce, shuffling pattern with turns. Gait Deviations: Slow Brenda, Decreased step length, Decreased step height, Shuffles  Distance: 50' x 2   Comments: Slow turns with poor approach to chair with pt needing VCs to increase foot clearance to decrease shuffling pattern with turns.  ,      FIMS:  ,  , Assessment: Pt overall more alert today with therapy, quality of gait improved following mobility drills with improved ability to weight shift, stride length improved with VCs with minimal carry over, shuffling pattern with turns and poor approach to chair. Occupational therapy: FIMS:   ,  , Assessment: Pt is an 81 y/o male from home with spouse who presents to [facility] with the above deficits which impact his ability to independently perform ADLs and IADLs. Pt would benefit from OT services to maximize independence and safety during self care.     Speech therapy: FIMS:        Lab/X-ray studies reviewed, analyzed and discussed with patient and staff:   Recent Results (from the past 24 hour(s))   POCT Glucose    Collection Time: 03/08/20  4:42 PM MAR)     Complex Physical Medicine & Rehab Issues Assess & Plan:   1. Severe abnormality of gait and mobility and impaired self-care and ADL's secondary to progressive Parkinson's disease. Functional and medical status reassessed regarding patients ability to participate in therapies and patient found to be able to participate in acute intensive comprehensive inpatient rehabilitation program including PT/OT to improve balance, ambulation, ADLs, and to improve the P/AROM. Therapeutic modifications regarding activities in therapies, place, amount of time per day and intensity of therapy made daily. In bed therapies or bedside therapies prn.   2. Bowel severe parkinsonian related constipation and Bladder dysfunction:  frequent toileting, ambulate to bathroom with assistance, check post void residuals. Check for C.difficile x1 if >2 loose stools in 24 hours, continue bowel & bladder program.  Monitor bowel and bladder function. Lactinex 2 PO every AC. MOM prn, Brown Bomb prn, Glycerin suppository prn, enema prn. Add scheduled milk of magnesia consider lactulose and fleets enema. Increase fruits in diet. 3. Severe Parkinson's related muscle stiffness in neck and back pain as well as generalized OA pain: reassess pain every shift and prior to and after each therapy session, give prn Tylenol and Norco with caution, modalities prn in therapy, Lidoderm, K-pad prn.   4. Skin healing and breakdown risk: Monitor closely because of rigidity from Parkinson's disease continue side-to-side turns. Continue pressure relief program.  Daily skin exams and reports from nursing.   5. Severe fatigue due to nutritional and hydration deficiency: Titrate vitamin B12 vitamin D and CoQ10 continue to monitor I&Os, calorie counts prn, dietary consult prn.  6. Acute episodic insomnia with situational adjustment disorder: Scheduled Tylenol at at bedtime, toilet prior to bedtime, consider eggcrate mattress prn Ambien, monitor for day Crestor, aspirin  8.  Sun downing-add to talk in improve sleep hygiene add Tylenol at at bedtime    Patient and his wife wished him to be DNR CCA        Cele Ocampo D.O., PM&R     Attending    [address]

## 2020-03-09 NOTE — PROGRESS NOTES
x20  Knee Short Arc Quad: x15  Physio/Swiss Ball: Knee to chest ball roll -Orange- x 15  Neurodevelopmental Techniques: Therapist assist with LTR to promote trunk rotation   Other exercises  Other exercises 1: Bridges x5, unable to clear hips off table     ASSESSMENT/COMMENTS:  Assessment: Increased time and effort required throught entire tx this PM secondary to fatigue. Frequent VC's/TC's need for technique, to remain on task and for encouragement. Ther ex utilized to increase BLE and core strength while improving overall functional mobility. Counting helps pt maintain a steady gait pattern. PLAN OF CARE/Safety:   Plan Comment: Cont. POC  Safety Devices  Type of devices: Left in chair;Chair alarm in place;Call light within reach; Telesitter in use      Therapy Time:   Individual   Time In 1400   Time Out 1500   Minutes 60     Minutes:      Transfer/Bed mobility trainin      Gait training: 15      Neuro re education: 0     Therapeutic ex: [address], RESHMA, 20 at 3:58 PM

## 2020-03-09 NOTE — PROGRESS NOTES
Wicho Arora is an 80 y.o.  male. Blood pressure (!) 160/82, pulse 62, temperature 97 °F (36.1 °C), temperature source Oral, resp. rate 17, height 5' 5\" (1.651 m), weight 140 lb 11.2 oz (63.8 kg), SpO2 96 %, RA. Pt c/o neck pain, scheduled Norco, Tylenol 1000 mg and PRN Tramadol 25 mg effective. LBM 3/5/20, prn MOM given, awaiting results. Stress incontinence/urgency, q 2hr toileting in place, improvement noted. Pt slept on and off, d/t pain in neck-slept for 5 hours with scheduled Norco and Tylenol and then was given Tramadol around 3 am, pt stayed up for an hour and returned to bed and slept. Skin tears noted to bilateral arms, dry dressings in place. [place]- bilateral hearing aides. AVASYS in place for confusion and fall precautions-pt can be aggressive at times. Poor appetite, refused snack. Pt daughter stated he was hallucinating-seeing people and grabbing at things that weren't there, pt only alert and oriented to self. Dtr stated this happened 3 weeks ago PCP checked  Urine and labs were negative. Will continue to monitor.    Nusrat Barry RN  3/9/2020

## 2020-03-09 NOTE — PROGRESS NOTES
transfers from Left to right when using squat pivot compated to stand pivot transfers. PLAN OF CARE/Safety:   Safety Devices  Type of devices: Left in chair;Chair alarm in place;Call light within reach; Telesitter in use      Therapy Time:   Individual   Time In 1100   Time Out 1130   Minutes 30     Minutes:30      Transfer/Bed mobility training:15      Gait training:10      Neuro re education:5     Therapeutic ex:0      Gino Diamond  03/09/20 at 11:40 AM

## 2020-03-09 NOTE — PLAN OF CARE
Problem: Falls - Risk of:  Goal: Will remain free from falls  Description: Will remain free from falls  Outcome: Ongoing  Goal: Absence of physical injury  Description: Absence of physical injury  Outcome: Ongoing     Problem: SAFETY  Goal: LTG - patient will adhere to hip precautions during ADL's and transfers  Outcome: Ongoing  Goal: LTG - Patient will demonstrate safety requirements appropriate to situation/environment  Outcome: Ongoing  Goal: LTG - patient will utilize safety techniques  Outcome: Ongoing  Goal: STG - patient locks brakes on wheelchair  Outcome: Ongoing  Goal: STG - Patient uses call light consistently to request assistance with transfers  Outcome: Ongoing  Goal: STG - patient uses gait belt during all transfers  Outcome: Ongoing     Problem:  Activity:  Goal: Ability to avoid complications of mobility impairment will improve  Description: Ability to avoid complications of mobility impairment will improve  Outcome: Ongoing  Goal: Range of joint motion will improve  Description: Range of joint motion will improve  Outcome: Ongoing  Goal: Ability to ambulate will improve  Description: Ability to ambulate will improve  Outcome: Ongoing  Goal: Muscle strength will improve  Description: Muscle strength will improve  Outcome: Ongoing     Problem: Health Behavior:  Goal: Identification of resources available to assist in meeting health care needs will improve  Description: Identification of resources available to assist in meeting health care needs will improve  Outcome: Ongoing     Problem: Safety:  Goal: Ability to remain free from injury will improve  Description: Ability to remain free from injury will improve  Outcome: Ongoing     Problem: Coping:  Goal: Ability to remain calm will improve  Description: Ability to remain calm will improve  Outcome: Ongoing     Problem: IP BALANCE  Goal: LTG - Patient will maintain balance to allow for safe/functional mobility  Outcome: Ongoing     Problem: IP

## 2020-03-10 LAB
GLUCOSE BLD-MCNC: 106 MG/DL (ref 60–115)
GLUCOSE BLD-MCNC: 120 MG/DL (ref 60–115)
PERFORMED ON: ABNORMAL
PERFORMED ON: NORMAL

## 2020-03-10 PROCEDURE — 6370000000 HC RX 637 (ALT 250 FOR IP): Performed by: NURSE PRACTITIONER

## 2020-03-10 PROCEDURE — 6360000002 HC RX W HCPCS: Performed by: PHYSICAL MEDICINE & REHABILITATION

## 2020-03-10 PROCEDURE — 97110 THERAPEUTIC EXERCISES: CPT

## 2020-03-10 PROCEDURE — 97535 SELF CARE MNGMENT TRAINING: CPT

## 2020-03-10 PROCEDURE — 1180000000 HC REHAB R&B

## 2020-03-10 PROCEDURE — 97130 THER IVNTJ EA ADDL 15 MIN: CPT

## 2020-03-10 PROCEDURE — 97116 GAIT TRAINING THERAPY: CPT

## 2020-03-10 PROCEDURE — 99232 SBSQ HOSP IP/OBS MODERATE 35: CPT | Performed by: PHYSICAL MEDICINE & REHABILITATION

## 2020-03-10 PROCEDURE — 97112 NEUROMUSCULAR REEDUCATION: CPT

## 2020-03-10 PROCEDURE — 6370000000 HC RX 637 (ALT 250 FOR IP): Performed by: PHYSICAL MEDICINE & REHABILITATION

## 2020-03-10 PROCEDURE — 6370000000 HC RX 637 (ALT 250 FOR IP): Performed by: INTERNAL MEDICINE

## 2020-03-10 PROCEDURE — 97530 THERAPEUTIC ACTIVITIES: CPT

## 2020-03-10 PROCEDURE — 97129 THER IVNTJ 1ST 15 MIN: CPT

## 2020-03-10 RX ORDER — DOCUSATE SODIUM 100 MG/1
100 CAPSULE, LIQUID FILLED ORAL DAILY
Status: DISCONTINUED | OUTPATIENT
Start: 2020-03-10 | End: 2020-03-13 | Stop reason: HOSPADM

## 2020-03-10 RX ORDER — NEOMYCIN SULFATE, POLYMYXIN B SULFATE AND DEXAMETHASONE 3.5; 10000; 1 MG/ML; [USP'U]/ML; MG/ML
1 SUSPENSION/ DROPS OPHTHALMIC EVERY 6 HOURS SCHEDULED
Status: DISCONTINUED | OUTPATIENT
Start: 2020-03-10 | End: 2020-03-13 | Stop reason: HOSPADM

## 2020-03-10 RX ORDER — SENNA PLUS 8.6 MG/1
1 TABLET ORAL NIGHTLY
Status: DISCONTINUED | OUTPATIENT
Start: 2020-03-10 | End: 2020-03-11

## 2020-03-10 RX ADMIN — CARBIDOPA AND LEVODOPA 1 TABLET: 25; 100 TABLET ORAL at 20:51

## 2020-03-10 RX ADMIN — METOPROLOL TARTRATE 50 MG: 50 TABLET, FILM COATED ORAL at 08:38

## 2020-03-10 RX ADMIN — LATANOPROST 1 DROP: 50 SOLUTION OPHTHALMIC at 20:54

## 2020-03-10 RX ADMIN — QUETIAPINE FUMARATE 25 MG: 25 TABLET ORAL at 20:50

## 2020-03-10 RX ADMIN — CARBIDOPA AND LEVODOPA 1 TABLET: 25; 100 TABLET ORAL at 12:32

## 2020-03-10 RX ADMIN — DOCUSATE SODIUM 100 MG: 100 CAPSULE, LIQUID FILLED ORAL at 08:38

## 2020-03-10 RX ADMIN — LEVOTHYROXINE SODIUM 25 MCG: 25 TABLET ORAL at 06:30

## 2020-03-10 RX ADMIN — MENTHOL AND METHYL SALICYLATE: 7.6; 29 OINTMENT TOPICAL at 20:54

## 2020-03-10 RX ADMIN — METFORMIN HYDROCHLORIDE 500 MG: 500 TABLET ORAL at 08:39

## 2020-03-10 RX ADMIN — PANTOPRAZOLE SODIUM 40 MG: 40 TABLET, DELAYED RELEASE ORAL at 06:30

## 2020-03-10 RX ADMIN — AMANTADINE HYDROCHLORIDE 100 MG: 100 CAPSULE ORAL at 08:40

## 2020-03-10 RX ADMIN — MENTHOL AND METHYL SALICYLATE: 7.6; 29 OINTMENT TOPICAL at 08:41

## 2020-03-10 RX ADMIN — CARBIDOPA AND LEVODOPA 1 TABLET: 25; 100 TABLET ORAL at 17:04

## 2020-03-10 RX ADMIN — HYPROMELLOSE 2910 1 DROP: 5 SOLUTION OPHTHALMIC at 12:33

## 2020-03-10 RX ADMIN — ASPIRIN 81 MG 81 MG: 81 TABLET ORAL at 20:50

## 2020-03-10 RX ADMIN — ROPINIROLE HYDROCHLORIDE 2 MG: 1 TABLET, FILM COATED ORAL at 20:50

## 2020-03-10 RX ADMIN — SELEGILINE HYDROCHLORIDE 5 MG: 5 CAPSULE ORAL at 08:37

## 2020-03-10 RX ADMIN — CLOPIDOGREL BISULFATE 75 MG: 75 TABLET ORAL at 08:38

## 2020-03-10 RX ADMIN — METFORMIN HYDROCHLORIDE 500 MG: 500 TABLET ORAL at 20:51

## 2020-03-10 RX ADMIN — Medication 100 MG: at 12:32

## 2020-03-10 RX ADMIN — ACETAMINOPHEN 1000 MG: 500 TABLET ORAL at 20:50

## 2020-03-10 RX ADMIN — NEOMYCIN SULFATE, POLYMYXIN B SULFATE AND DEXAMETHASONE 1 DROP: 3.5; 10000; 1 SUSPENSION OPHTHALMIC at 17:04

## 2020-03-10 RX ADMIN — SERTRALINE HYDROCHLORIDE 25 MG: 25 TABLET ORAL at 08:39

## 2020-03-10 RX ADMIN — AMLODIPINE BESYLATE 5 MG: 5 TABLET ORAL at 08:37

## 2020-03-10 RX ADMIN — Medication 100 MG: at 08:37

## 2020-03-10 RX ADMIN — CARBIDOPA AND LEVODOPA 1 TABLET: 25; 100 TABLET ORAL at 08:41

## 2020-03-10 RX ADMIN — ROSUVASTATIN CALCIUM 10 MG: 5 TABLET, COATED ORAL at 20:58

## 2020-03-10 RX ADMIN — ENOXAPARIN SODIUM 40 MG: 40 INJECTION SUBCUTANEOUS at 08:40

## 2020-03-10 RX ADMIN — SENNOSIDES 8.6 MG: 8.6 TABLET, FILM COATED ORAL at 20:50

## 2020-03-10 RX ADMIN — METOPROLOL TARTRATE 50 MG: 50 TABLET, FILM COATED ORAL at 20:50

## 2020-03-10 RX ADMIN — LISINOPRIL 20 MG: 20 TABLET ORAL at 08:42

## 2020-03-10 RX ADMIN — HYDROCODONE BITARTRATE AND ACETAMINOPHEN 1 TABLET: 5; 325 TABLET ORAL at 20:50

## 2020-03-10 RX ADMIN — LISINOPRIL 20 MG: 20 TABLET ORAL at 17:04

## 2020-03-10 ASSESSMENT — PAIN SCALES - GENERAL
PAINLEVEL_OUTOF10: 0
PAINLEVEL_OUTOF10: 2
PAINLEVEL_OUTOF10: 2
PAINLEVEL_OUTOF10: 0

## 2020-03-10 ASSESSMENT — PAIN DESCRIPTION - LOCATION
LOCATION: HEAD
LOCATION: HEAD

## 2020-03-10 ASSESSMENT — PAIN SCALES - WONG BAKER: WONGBAKER_NUMERICALRESPONSE: 0

## 2020-03-10 NOTE — PROGRESS NOTES
lisinopril  20 mg Oral BID    metFORMIN  500 mg Oral BID    metoprolol tartrate  50 mg Oral BID    pantoprazole  40 mg Oral QAM AC    rosuvastatin  10 mg Oral Nightly    rOPINIRole  2 mg Oral Nightly    selegiline  5 mg Oral Daily    sertraline  25 mg Oral Daily    latanoprost  1 drop Both Eyes Nightly    enoxaparin  40 mg Subcutaneous Daily    aspirin  81 mg Oral Nightly       LABS Reviewed    IMAGING Reviewed    Shobha Akbar CNP  [facility] Hospitalist

## 2020-03-10 NOTE — PROGRESS NOTES
Physical Therapy Rehab Treatment Note  Facility/Department: [facility]  [facility]       NAME: Ashlee Lopez  : 10/6/1930 (80 y.o.)  MRN: [id number]  CODE STATUS: DNR-CCA    Date of Service: 3/10/2020  Chart Reviewed: Yes  Family / Caregiver Present: No    Restrictions:  Restrictions/Precautions: Fall Risk       SUBJECTIVE: Subjective: I'm good, no pain  Pain Screening  Patient Currently in Pain: Denies       Post Treatment Pain Screenin/10       OBJECTIVE:               Neuromuscular Education  Neuromuscular Comments: Balance activities with ADLs at bathroom sink with pt able to maintain balance with reaching tasks while washing hands. Pt requried CGA only for safety. Bed mobility  Bridging: Stand by assistance(unable to clear hip from Mat surface, active enguagement. )    Transfers  Sit to Stand: Contact guard assistance  Stand to sit: Contact guard assistance  Bed to Chair: Moderate assistance  Squat Pivot Transfers: Moderate Assistance    Ambulation  Ambulation?: Yes  Ambulation 1  Surface: carpet  Device: Rollator  Assistance: Contact guard assistance  Quality of Gait: Fwd flexed posture with festinating gait pattern that increase with turns and approach to chair. Distance: 15'     Stairs/Curb  Stairs?: No              Exercises  Heelslides: x12  Hip Flexion: Supine Marching x 15  Hip Abduction: Supine x12 / Hip ADD ball squeeze x20  Physio/Swiss Ball: Knee to chest ball roll -Orange- x 15  Neurodevelopmental Techniques: Therapist assist with LTR to promote trunk rotation   Other exercises  Other exercises 1: Bridges 2 x10 , unable to clear hips off table     ASSESSMENT/COMMENTS:  Body structures, Functions, Activity limitations: Decreased functional mobility ; Decreased strength;Decreased endurance;Decreased balance;Decreased coordination;Decreased ROM; Decreased sensation;Decreased safe awareness;Decreased posture  Assessment: Pt required increaseed time to complete activities with increased

## 2020-03-10 NOTE — PROGRESS NOTES
Physical Therapy Rehab Treatment Note  [facility]Department[facility]  [facility]       NAME: Paula Carbajal  : 10/6/1930 (37 y.o.)  MRN: [id number]  CODE STATUS: DNR-CCA    Date of Service: 3/10/2020  Chart Reviewed: Yes  Family / Caregiver Present: No    Restrictions:  Restrictions/Precautions: Fall Risk       SUBJECTIVE: Subjective: I got a little nap in, it wasn't enough. Pain Screening  Patient Currently in Pain: Denies       Post Treatment Pain Screenin/10       OBJECTIVE:               Neuromuscular Education  Neuromuscular Comments: Balance activities with ADLs at bathroom sink with pt able to maintain balance with reaching tasks while washing hands. Pt requried CGA only for safety. Bed mobility  Bridging: Stand by assistance(unable to clear hip from Mat surface, active enguagement. )    Transfers  Sit to Stand: Minimal Assistance; Moderate Assistance;Contact guard assistance  Stand to sit: Contact guard assistance  Bed to Chair: Moderate assistance  Squat Pivot Transfers: Moderate Assistance  Car Transfer: Minimal Assistance; Moderate Assistance  Comment: Retro push back with sit-stand causing WC to tip, Mod A to keep pt safe. VCs to correct technique  with CGA for safety. Min A to get into car and Mod A to get out with increased support needed to stand from a lower position. Ambulation  Ambulation?: Yes  Ambulation 1  Surface: carpet  Device: Rollator  Assistance: Contact guard assistance  Quality of Gait: Fwd flexed posture with festinating gait pattern that increase with turns and approach to chair. Gait Deviations: Slow Brenda;Decreased step length;Decreased step height;Shuffles  Distance: 50' x 2    Stairs/Curb  Stairs?: Yes  Stairs  Curbs: 2\"  Device: 4 wheeled walker  Assistance: Minimal assistance  Comment: Min A for cues for proper sequencing and assistance to get front wheels of Rollator up on curb. No LOB at this attempt.           Activity Tolerance  Activity Tolerance:

## 2020-03-10 NOTE — PROGRESS NOTES
Pain: Yes        Objective  Treatment focus on increasing coordination of B UE's for increased ease of ADL's. Patient engaged in placing large pegs in and out of the activity form board with supervision. Patient would fall asleep. Patient engaged in UE isometric exercises resisting ross palm to palm, x10 with each UE. Iam Roque Plan   Plan  Times per week: 5-7x  Plan weeks: 1.5 weeks  Current Treatment Recommendations: Strengthening, Balance Training, Functional Mobility Training, Endurance Training, Patient/Caregiver Education & Training, Equipment Evaluation, Education, & procurement, Safety Education & Training, Self-Care / ADL, Home Management Training, Positioning, Neuromuscular Re-education, Cognitive/Perceptual Training  Plan Comment: Con't per OT POC for d/c on 3-17-20                           Goals  Patient Goals   Patient goals :  \"To get stronger and walk better to go home\"       Therapy Time   Individual Concurrent Group Co-treatment   Time In 1500         Time Out 1530         Minutes 30                 CAMELIA Sanders  Electronically signed by VIMAL Sanders on 3/10/20 at 3:37 PM EDT

## 2020-03-10 NOTE — PROGRESS NOTES
place and     situation with min cues. No significant change in deep tendon reflexes or     sensation severe Parkinson's rigidity  Lungs:  Diminished CTA-B. Respiration effort is normal at rest.     Heart:   S1 = S2, RRR. No loud murmurs. Abdomen:  Soft, non-tender, no enlargement of liver or spleen. Extremities:  No significant lower extremity edema or tenderness. Skin:   Intact to general survey, no visualized or palpated problems. Rehabilitation:  Physical therapy: FIMS:  Bed Mobility: Scooting: Moderate assistance    Transfers: Sit to Stand: Contact guard assistance  Stand to sit: Contact guard assistance  Bed to Chair: Minimal assistance, Moderate assistance  Squat Pivot Transfers: Maximum Assistance, Ambulation 1  Surface: carpet  Device: Rollator  Assistance: Contact guard assistance  Quality of Gait: Heavy FF posture, festinating gait, shuffling gait that increases with turns, frequent vc's throughout for safety and sequencing of LE  Gait Deviations: Slow Brenda, Decreased step length, Decreased step height, Shuffles  Distance: 13' x2  Comments: distance limited by fatigue and destination, vc's for approach to chair as pt tends to turn too soon,      FIMS:  ,  , Assessment: Increased time and effort required throught entire tx this PM secondary to fatigue. Frequent VC's/TC's need for technique, to remain on task and for encouragement. Ther ex utilized to increase BLE and core strength while improving overall functional mobility. Counting helps pt maintain a steady gait pattern. Occupational therapy: FIMS:   ,  , Assessment: Pt is an 79 y/o male from home with spouse who presents to [id number] [place] [address] with the above deficits which impact his ability to independently perform ADLs and IADLs. Pt would benefit from OT services to maximize independence and safety during self care.     Speech therapy: FIMS:        Lab/X-ray studies reviewed, analyzed and discussed with patient and staff:   Recent Results (from again identified. Previous extensive, complex labs, notes and diagnostics reviewed and analyzed. ALLERGIES:    Allergies as of 03/02/2020    (No Known Allergies)      (please also verify by checking [facility])     Complex Physical Medicine & Rehab Issues Assess & Plan:   1. Severe abnormality of gait and mobility and impaired self-care and ADL's secondary to progressive Parkinson's disease. Functional and medical status reassessed regarding patients ability to participate in therapies and patient found to be able to participate in acute intensive comprehensive inpatient rehabilitation program including PT/OT to improve balance, ambulation, ADLs, and to improve the P/AROM. Therapeutic modifications regarding activities in therapies, place, amount of time per day and intensity of therapy made daily. In bed therapies or bedside therapies prn.   2. Bowel severe parkinsonian related constipation and Bladder dysfunction:  frequent toileting, ambulate to bathroom with assistance, check post void residuals. Check for C.difficile x1 if >2 loose stools in 24 hours, continue bowel & bladder program.  Monitor bowel and bladder function. Lactinex 2 PO every AC. MOM prn, Brown Bomb prn, Glycerin suppository prn, enema prn. Add scheduled milk of magnesia consider lactulose and fleets enema. Increase fruits in diet. Add lactulose twice daily  3. Severe Parkinson's related muscle stiffness in neck and back pain as well as generalized OA pain: reassess pain every shift and prior to and after each therapy session, give prn Tylenol and Norco with caution, modalities prn in therapy, Lidoderm, K-pad prn.   4. Skin healing and breakdown risk: Monitor closely because of rigidity from Parkinson's disease continue side-to-side turns. Continue pressure relief program.  Daily skin exams and reports from nursing.   5. Severe fatigue due to nutritional and hydration deficiency: Titrate vitamin B12 vitamin D and CoQ10 continue to monitor I&Os, calorie counts prn, dietary consult prn.  6. Acute episodic insomnia with situational adjustment disorder: Scheduled Tylenol at at bedtime, toilet prior to bedtime, consider eggcrate mattress prn Ambien, monitor for day time sedation. 7. Falls risk elevated: Add video monitor as patient is impulsive monitor for any signs of injury as patient was impulsive and fell on 3/5/2020. So far no signs of injury. Patient to use call light to get nursing assistance to get up, bed and chair alarm. Increased frequency of nursing checks and cueing for safety. 8. Elevated DVT risk: progressive activities in PT, continue prophylaxis LINDEN hose, elevation and Lovenox. 9. Complex discharge planning: Discharge 3/17/2020 home with his wife with help from his daughters who live locally. He will likely need 24-hour care at home. Until then continue weekly team meeting every Thursdays to assess progress towards goals, discuss and address social, psychological and medical comorbidities and to address difficulties they may be having progressing in therapy. Patient and family education is in progress. The patient is to follow-up with their family physician after discharge. Complex Active General Medical Issues that complicate care Assess & Plan:    1. Vitamin D deficiency-add high-dose vitamin D  2. Depression anxiety-add rec therapy rehabilitation psychology emotional support to be given daily titrate Zoloft  3. Hypothyroidism-titrate Synthroid  4. Spinal stenosis of lumbar region with neurogenic claudication, Thoracic back pain, Low back pain with sciatica-Lidoderm, egg crate, lowest effected dose of Norco  5.    Type 2 diabetes mellitus without retinopathy and hyperglycemia-fingerstick blood sugar checks q. before meals and at bedtime dose and titrate diabetic medications to include Humalog and Glucophage, add diabetic add dietary add and titrate insulin and carbohydrate intake add ADA restriction to

## 2020-03-10 NOTE — PROGRESS NOTES
Occupational Therapy  Facility/Department: [facility]  Daily Treatment Note  NAME: Kings Riley  : 10/6/1930  MRN: [id number]    Date of Service: 3/10/2020    Discharge Recommendations:  Continue to assess pending progress       Assessment      Activity Tolerance  Activity Tolerance: Patient limited by fatigue  Safety Devices  Safety Devices in place: Yes  Type of devices: All fall risk precautions in place  Restraints  Initially in place: No         Patient Diagnosis(es): There were no encounter diagnoses. has a past medical history of Anxiety, CAD (coronary artery disease), Cancer ([address] Chronic back pain, Depression, Essential hypertension, History of blood clots, History of kidney stones, Hyperlipidemia, Hypothyroid, Low back pain with sciatica, Parkinson disease ([address] Type 2 diabetes mellitus without retinopathy ([facility] [address] and Vertigo. has a past surgical history that includes Lithotripsy (14); Appendectomy; eye surgery; Prostate surgery; Upper gastrointestinal endoscopy; Rotator cuff repair (); Coronary angioplasty with stent; and Eye surgery (). Restrictions  Restrictions/Precautions  Restrictions/Precautions: Fall Risk  Subjective \"I'm not as tired today. \"  Pt reports 0/10 pain during session. General  Chart Reviewed: Yes  Patient assessed for rehabilitation services?: Yes  Referring Practitioner: Dr. Corinne Rao  Diagnosis: Impaired mobility 2º to exacerbation of parkinson's      Orientation     Objective  Pt completed acts at table top to increase bilateral UE function for task completion. Pt completed placing clothespins on horizontal yardstick and placing 56 marbles on 100 hole pegboard to increase bilateral hand function for task completion while seated at table top. Pt required min/mod verbal cues to remain on task.             Plan   Plan  Times per week: 5-7x  Plan weeks: 1.5 weeks  Current Treatment Recommendations: Strengthening, Balance Training, Functional Mobility Training, Endurance Training, Patient/Caregiver Education & Training, Equipment Evaluation, Education, & procurement, Safety Education & Training, Self-Care / ADL, Home Management Training, Positioning, Neuromuscular Re-education, Cognitive/Perceptual Training  Plan Comment: Con't per OT POC for d/c on 3-17-20  G-Code     OutComes Score                                                  AM-PAC Score             Goals  Patient Goals   Patient goals :  \"To get stronger and walk better to go home\"       Therapy Time   Individual Concurrent Group Co-treatment   Time In 1030         Time Out 1100         Minutes 30              Therapeutic activities: 30 minutes  [id number] OTR/L Electronically signed by [id number] OTR/L on 0/61/06 at 1:56 PM EDT

## 2020-03-11 LAB
GLUCOSE BLD-MCNC: 111 MG/DL (ref 60–115)
GLUCOSE BLD-MCNC: 79 MG/DL (ref 60–115)
PERFORMED ON: NORMAL
PERFORMED ON: NORMAL

## 2020-03-11 PROCEDURE — 6370000000 HC RX 637 (ALT 250 FOR IP): Performed by: INTERNAL MEDICINE

## 2020-03-11 PROCEDURE — 97530 THERAPEUTIC ACTIVITIES: CPT

## 2020-03-11 PROCEDURE — 6360000002 HC RX W HCPCS: Performed by: PHYSICAL MEDICINE & REHABILITATION

## 2020-03-11 PROCEDURE — 6370000000 HC RX 637 (ALT 250 FOR IP): Performed by: PHYSICAL MEDICINE & REHABILITATION

## 2020-03-11 PROCEDURE — 97535 SELF CARE MNGMENT TRAINING: CPT

## 2020-03-11 PROCEDURE — 97112 NEUROMUSCULAR REEDUCATION: CPT

## 2020-03-11 PROCEDURE — 6370000000 HC RX 637 (ALT 250 FOR IP): Performed by: NURSE PRACTITIONER

## 2020-03-11 PROCEDURE — 97116 GAIT TRAINING THERAPY: CPT

## 2020-03-11 PROCEDURE — 1180000000 HC REHAB R&B

## 2020-03-11 PROCEDURE — 99232 SBSQ HOSP IP/OBS MODERATE 35: CPT | Performed by: PHYSICAL MEDICINE & REHABILITATION

## 2020-03-11 PROCEDURE — 99232 SBSQ HOSP IP/OBS MODERATE 35: CPT | Performed by: PSYCHIATRY & NEUROLOGY

## 2020-03-11 RX ADMIN — SERTRALINE HYDROCHLORIDE 25 MG: 25 TABLET ORAL at 08:06

## 2020-03-11 RX ADMIN — CARBIDOPA AND LEVODOPA 1 TABLET: 25; 100 TABLET ORAL at 08:09

## 2020-03-11 RX ADMIN — NEOMYCIN SULFATE, POLYMYXIN B SULFATE AND DEXAMETHASONE 1 DROP: 3.5; 10000; 1 SUSPENSION OPHTHALMIC at 16:53

## 2020-03-11 RX ADMIN — CARBIDOPA AND LEVODOPA 1 TABLET: 25; 100 TABLET ORAL at 13:13

## 2020-03-11 RX ADMIN — NEOMYCIN SULFATE, POLYMYXIN B SULFATE AND DEXAMETHASONE 1 DROP: 3.5; 10000; 1 SUSPENSION OPHTHALMIC at 13:14

## 2020-03-11 RX ADMIN — AMANTADINE HYDROCHLORIDE 100 MG: 100 CAPSULE ORAL at 08:08

## 2020-03-11 RX ADMIN — ROSUVASTATIN CALCIUM 10 MG: 5 TABLET, COATED ORAL at 22:11

## 2020-03-11 RX ADMIN — ACETAMINOPHEN 1000 MG: 500 TABLET ORAL at 22:12

## 2020-03-11 RX ADMIN — LISINOPRIL 20 MG: 20 TABLET ORAL at 16:52

## 2020-03-11 RX ADMIN — NEOMYCIN SULFATE, POLYMYXIN B SULFATE AND DEXAMETHASONE 1 DROP: 3.5; 10000; 1 SUSPENSION OPHTHALMIC at 06:19

## 2020-03-11 RX ADMIN — CARBIDOPA AND LEVODOPA 1 TABLET: 25; 100 TABLET ORAL at 22:11

## 2020-03-11 RX ADMIN — SELEGILINE HYDROCHLORIDE 5 MG: 5 CAPSULE ORAL at 08:06

## 2020-03-11 RX ADMIN — ASPIRIN 81 MG 81 MG: 81 TABLET ORAL at 22:11

## 2020-03-11 RX ADMIN — DOCUSATE SODIUM 100 MG: 100 CAPSULE, LIQUID FILLED ORAL at 08:08

## 2020-03-11 RX ADMIN — ENOXAPARIN SODIUM 40 MG: 40 INJECTION SUBCUTANEOUS at 08:14

## 2020-03-11 RX ADMIN — ROPINIROLE HYDROCHLORIDE 2 MG: 1 TABLET, FILM COATED ORAL at 22:11

## 2020-03-11 RX ADMIN — CLOPIDOGREL BISULFATE 75 MG: 75 TABLET ORAL at 08:06

## 2020-03-11 RX ADMIN — QUETIAPINE FUMARATE 25 MG: 25 TABLET ORAL at 22:11

## 2020-03-11 RX ADMIN — Medication 100 MG: at 13:13

## 2020-03-11 RX ADMIN — Medication 100 MG: at 08:07

## 2020-03-11 RX ADMIN — LISINOPRIL 20 MG: 20 TABLET ORAL at 08:09

## 2020-03-11 RX ADMIN — CYANOCOBALAMIN 1000 MCG: 1000 INJECTION, SOLUTION INTRAMUSCULAR; SUBCUTANEOUS at 08:09

## 2020-03-11 RX ADMIN — METFORMIN HYDROCHLORIDE 500 MG: 500 TABLET ORAL at 08:09

## 2020-03-11 RX ADMIN — HYDROCODONE BITARTRATE AND ACETAMINOPHEN 1 TABLET: 5; 325 TABLET ORAL at 10:43

## 2020-03-11 RX ADMIN — LATANOPROST 1 DROP: 50 SOLUTION OPHTHALMIC at 22:17

## 2020-03-11 RX ADMIN — METOPROLOL TARTRATE 50 MG: 50 TABLET, FILM COATED ORAL at 22:12

## 2020-03-11 RX ADMIN — PANTOPRAZOLE SODIUM 40 MG: 40 TABLET, DELAYED RELEASE ORAL at 06:19

## 2020-03-11 RX ADMIN — NEOMYCIN SULFATE, POLYMYXIN B SULFATE AND DEXAMETHASONE 1 DROP: 3.5; 10000; 1 SUSPENSION OPHTHALMIC at 22:17

## 2020-03-11 RX ADMIN — HYDROCODONE BITARTRATE AND ACETAMINOPHEN 1 TABLET: 5; 325 TABLET ORAL at 22:12

## 2020-03-11 RX ADMIN — MENTHOL AND METHYL SALICYLATE: 7.6; 29 OINTMENT TOPICAL at 22:18

## 2020-03-11 RX ADMIN — AMLODIPINE BESYLATE 5 MG: 5 TABLET ORAL at 08:08

## 2020-03-11 RX ADMIN — METOPROLOL TARTRATE 50 MG: 50 TABLET, FILM COATED ORAL at 08:08

## 2020-03-11 RX ADMIN — CARBIDOPA AND LEVODOPA 1 TABLET: 25; 100 TABLET ORAL at 16:52

## 2020-03-11 RX ADMIN — LEVOTHYROXINE SODIUM 25 MCG: 25 TABLET ORAL at 06:19

## 2020-03-11 ASSESSMENT — ENCOUNTER SYMPTOMS
VOMITING: 0
CHEST TIGHTNESS: 0
COLOR CHANGE: 0
WHEEZING: 0
SHORTNESS OF BREATH: 0
NAUSEA: 0
COUGH: 0
TROUBLE SWALLOWING: 0

## 2020-03-11 ASSESSMENT — PAIN SCALES - GENERAL
PAINLEVEL_OUTOF10: 5
PAINLEVEL_OUTOF10: 6
PAINLEVEL_OUTOF10: 0
PAINLEVEL_OUTOF10: 4

## 2020-03-11 ASSESSMENT — PAIN DESCRIPTION - LOCATION: LOCATION: NECK;JAW

## 2020-03-11 ASSESSMENT — PAIN DESCRIPTION - PAIN TYPE: TYPE: CHRONIC PAIN

## 2020-03-11 ASSESSMENT — PAIN DESCRIPTION - DESCRIPTORS: DESCRIPTORS: ACHING;TIGHTNESS

## 2020-03-11 NOTE — PROGRESS NOTES
General: He is not in acute distress. Appearance: He is not diaphoretic. HENT:      Head: Normocephalic and atraumatic. Eyes:      Extraocular Movements: Extraocular movements intact. Pupils: Pupils are equal, round, and reactive to light. Cardiovascular:      Rate and Rhythm: Normal rate and regular rhythm. Pulmonary:      Effort: Pulmonary effort is normal. No respiratory distress. Breath sounds: Normal breath sounds. Abdominal:      General: Bowel sounds are normal. There is no distension. Palpations: Abdomen is soft. Tenderness: There is no abdominal tenderness. Skin:     General: Skin is warm and dry. Neurological:      Mental Status: He is alert and oriented to person, place, and time. Cranial Nerves: No cranial nerve deficit. Motor: Weakness present. No seizure activity.       Gait: Gait abnormal.      Comments: Confused at times         Medications:  Reviewed    Infusion Medications:    dextrose       Scheduled Medications:    docusate sodium  100 mg Oral Daily    neomycin-polymyxin-dexameth  1 drop Both Eyes 4 times per day    QUEtiapine  25 mg Oral Nightly    acetaminophen  1,000 mg Oral Nightly    coenzyme Q10  100 mg Oral BID AC    cyanocobalamin  1,000 mcg Intramuscular Weekly    HYDROcodone 5 mg - acetaminophen  1 tablet Oral Nightly    analgesic ointment   Topical TID    lidocaine  3 patch Transdermal Daily    amantadine  100 mg Oral Daily    amLODIPine  5 mg Oral Daily    carbidopa-levodopa  1 tablet Oral 4x Daily    clopidogrel  75 mg Oral Daily    levothyroxine  25 mcg Oral Daily    lisinopril  20 mg Oral BID    metFORMIN  500 mg Oral BID    metoprolol tartrate  50 mg Oral BID    pantoprazole  40 mg Oral QAM AC    rosuvastatin  10 mg Oral Nightly    rOPINIRole  2 mg Oral Nightly    selegiline  5 mg Oral Daily    sertraline  25 mg Oral Daily    latanoprost  1 drop Both Eyes Nightly    enoxaparin  40 mg Subcutaneous Daily   

## 2020-03-11 NOTE — PROGRESS NOTES
spleen. Extremities:  No significant lower extremity edema or tenderness. Skin:   Intact to general survey, no visualized or palpated problems. Rehabilitation:  Physical therapy: FIMS:  Bed Mobility: Scooting: Moderate assistance    Transfers: Sit to Stand: Minimal Assistance, Moderate Assistance, Contact guard assistance  Stand to sit: Contact guard assistance  Bed to Chair: Moderate assistance  Squat Pivot Transfers: Moderate Assistance, Ambulation 1  Surface: carpet  Device: Rollator  Assistance: Contact guard assistance  Quality of Gait: Fwd flexed posture with festinating gait pattern that increase with turns and approach to chair. Gait Deviations: Slow Brenda, Decreased step length, Decreased step height, Shuffles  Distance: 50' x 2  Comments: distance limited by fatigue and destination, vc's for approach to chair as pt tends to turn too soon, Stairs  Curbs: 2\"  Device: 4 wheeled walker  Assistance: Minimal assistance  Comment: Min A for cues for proper sequencing and assistance to get front wheels of Rollator up on curb. No LOB at this attempt. FIMS:  ,  , Assessment: Pt with min A to enter car and Mod A to exit car as pt required additional help to stand from and low seated position. Pt improved with quality of gait with each attempt with improved stride length and speed, to continues to struggle with turns on approach to chair as pt makes wide turn with suffling gait pattern causing pt to back up from longer distances. Occupational therapy: FIMS:   ,  , Assessment: Pt is an 81 y/o male from home with spouse who presents to [facility] with the above deficits which impact his ability to independently perform ADLs and IADLs. Pt would benefit from OT services to maximize independence and safety during self care.     Speech therapy: FIMS:        Lab/X-ray studies reviewed, analyzed and discussed with patient and staff:   Recent Results (from the past 24 hour(s))   POCT Glucose    Collection Time: titrate diabetic medications to include Humalog and Glucophage, add diabetic add dietary add and titrate insulin and carbohydrate intake add ADA restriction to diet  6. Vertigo-titrate Antivert with caution due to dry mouth  7. Essential hypertension, Mixed hyperlipidemia-vital signs every shift dose and titrate cardiac medications to include Plavix, Norvasc, Zestril, Lopressor, Nitrostat, Crestor, aspirin  8.  Sun downing-add to talk in improve sleep hygiene add Tylenol at at bedtime    Patient and his wife wished him to be DNR CCA        Helder Amezcua D.O., PM&R     Attending    [address]

## 2020-03-11 NOTE — PROGRESS NOTES
Pt is AO x 2-3 and pleasant, c/o headache pain while in therapy that was relieved by PRN Norco, pt's bilat eyes have yellow discharge surrounding eyes and reddened conjunctiva that appears to have improved from yesterday with the addition of Maxitrol eyedrops pt reports that his eyes feel better from yesterday, assisted pt with cleansing of eyes with a wet washcloth, [facility] 3/11 two episodes of diarrhea in the morning thus d/c of Senna at HS no further episodes during the day, call light within reach for pt safety chair alarm engaged for pt safety ELAINE in room for pt poor safety awareness Electronically signed by Waldo Logan RN on 3/11/2020 at 6:33 PM

## 2020-03-11 NOTE — PLAN OF CARE
Problem: Falls - Risk of:  Goal: Will remain free from falls  Description: Will remain free from falls  3/11/2020 1206 by Marcelle Mercedes RN  Outcome: Ongoing  3/11/2020 0254 by Kamar Vides RN  Outcome: Ongoing  Goal: Absence of physical injury  Description: Absence of physical injury  3/11/2020 1206 by Marcelle Mercedes RN  Outcome: Ongoing  3/11/2020 0254 by Kamar Vides RN  Outcome: Ongoing     Problem: SAFETY  Goal: LTG - patient will adhere to hip precautions during ADL's and transfers  3/11/2020 1206 by Marcelle Mercedes RN  Outcome: Ongoing  3/11/2020 0254 by Kamar Vides RN  Outcome: Ongoing  Goal: LTG - Patient will demonstrate safety requirements appropriate to situation/environment  3/11/2020 1206 by Marcelle Mercedes RN  Outcome: Ongoing  3/11/2020 0254 by Kamar Vides RN  Outcome: Ongoing  Goal: LTG - patient will utilize safety techniques  3/11/2020 1206 by Marcelle Mercedes RN  Outcome: Ongoing  3/11/2020 0254 by Kamar Vides RN  Outcome: Ongoing  Goal: STG - patient locks brakes on wheelchair  3/11/2020 1206 by Marcelle Mercedes RN  Outcome: Ongoing  3/11/2020 0254 by Kamar Vides RN  Outcome: Ongoing  Goal: STG - Patient uses call light consistently to request assistance with transfers  3/11/2020 1206 by Marcelle Mercedes RN  Outcome: Ongoing  3/11/2020 0254 by Kamar Vides RN  Outcome: Ongoing  Goal: STG - patient uses gait belt during all transfers  3/11/2020 1206 by Marcelle Mercedes RN  Outcome: Ongoing  3/11/2020 0254 by Kamar Vides RN  Outcome: Ongoing     Problem:  Activity:  Goal: Ability to avoid complications of mobility impairment will improve  Description: Ability to avoid complications of mobility impairment will improve  3/11/2020 1206 by Marcelle Mercedes RN  Outcome: Ongoing  3/11/2020 0254 by Kamar Vides RN  Outcome: Ongoing  Goal: Range of joint motion will improve  Description: Range of joint motion will completion of daily activities  3/11/2020 1206 by Beata Bar RN  Outcome: Ongoing  3/11/2020 0254 by Nellie Haddad RN  Outcome: Ongoing     Problem: Pain:  Goal: Pain level will decrease  Description: Pain level will decrease  3/11/2020 1206 by Beata Bar RN  Outcome: Ongoing  3/11/2020 0254 by Nellie Haddad RN  Outcome: Ongoing  Goal: Control of acute pain  Description: Control of acute pain  3/11/2020 1206 by Beata Bar RN  Outcome: Ongoing  3/11/2020 0254 by Nellie Haddad RN  Outcome: Ongoing  Goal: Control of chronic pain  Description: Control of chronic pain  3/11/2020 1206 by eBata Bar RN  Outcome: Ongoing  3/11/2020 0254 by Nellie Haddad RN  Outcome: Ongoing     Problem: IP SWALLOWING  Goal: LTG - patient will tolerate the least restrictive diet consistency to allow for safe consumption of daily meals  3/11/2020 1206 by Beata aBr RN  Outcome: Ongoing  3/11/2020 0254 by Nellie Haddad RN  Outcome: Ongoing

## 2020-03-12 LAB
GLUCOSE BLD-MCNC: 112 MG/DL (ref 60–115)
GLUCOSE BLD-MCNC: 125 MG/DL (ref 60–115)
GLUCOSE BLD-MCNC: 85 MG/DL (ref 60–115)
PERFORMED ON: ABNORMAL
PERFORMED ON: NORMAL
PERFORMED ON: NORMAL

## 2020-03-12 PROCEDURE — 6370000000 HC RX 637 (ALT 250 FOR IP): Performed by: NURSE PRACTITIONER

## 2020-03-12 PROCEDURE — 1180000000 HC REHAB R&B

## 2020-03-12 PROCEDURE — 99233 SBSQ HOSP IP/OBS HIGH 50: CPT | Performed by: PHYSICAL MEDICINE & REHABILITATION

## 2020-03-12 PROCEDURE — 97535 SELF CARE MNGMENT TRAINING: CPT

## 2020-03-12 PROCEDURE — 97129 THER IVNTJ 1ST 15 MIN: CPT

## 2020-03-12 PROCEDURE — 97530 THERAPEUTIC ACTIVITIES: CPT

## 2020-03-12 PROCEDURE — 6360000002 HC RX W HCPCS: Performed by: PHYSICAL MEDICINE & REHABILITATION

## 2020-03-12 PROCEDURE — 6370000000 HC RX 637 (ALT 250 FOR IP): Performed by: PHYSICAL MEDICINE & REHABILITATION

## 2020-03-12 PROCEDURE — 6370000000 HC RX 637 (ALT 250 FOR IP): Performed by: INTERNAL MEDICINE

## 2020-03-12 PROCEDURE — 97116 GAIT TRAINING THERAPY: CPT

## 2020-03-12 PROCEDURE — 97130 THER IVNTJ EA ADDL 15 MIN: CPT

## 2020-03-12 PROCEDURE — 97110 THERAPEUTIC EXERCISES: CPT

## 2020-03-12 RX ADMIN — ROPINIROLE HYDROCHLORIDE 2 MG: 1 TABLET, FILM COATED ORAL at 22:18

## 2020-03-12 RX ADMIN — SERTRALINE HYDROCHLORIDE 25 MG: 25 TABLET ORAL at 11:05

## 2020-03-12 RX ADMIN — SELEGILINE HYDROCHLORIDE 5 MG: 5 CAPSULE ORAL at 11:05

## 2020-03-12 RX ADMIN — LISINOPRIL 20 MG: 20 TABLET ORAL at 08:39

## 2020-03-12 RX ADMIN — AMLODIPINE BESYLATE 5 MG: 5 TABLET ORAL at 08:38

## 2020-03-12 RX ADMIN — ACETAMINOPHEN 650 MG: 325 TABLET ORAL at 14:39

## 2020-03-12 RX ADMIN — METOPROLOL TARTRATE 50 MG: 50 TABLET, FILM COATED ORAL at 08:37

## 2020-03-12 RX ADMIN — AMANTADINE HYDROCHLORIDE 100 MG: 100 CAPSULE ORAL at 08:38

## 2020-03-12 RX ADMIN — METFORMIN HYDROCHLORIDE 500 MG: 500 TABLET ORAL at 08:38

## 2020-03-12 RX ADMIN — LEVOTHYROXINE SODIUM 25 MCG: 25 TABLET ORAL at 06:24

## 2020-03-12 RX ADMIN — MENTHOL AND METHYL SALICYLATE: 7.6; 29 OINTMENT TOPICAL at 14:40

## 2020-03-12 RX ADMIN — PANTOPRAZOLE SODIUM 40 MG: 40 TABLET, DELAYED RELEASE ORAL at 06:24

## 2020-03-12 RX ADMIN — ACETAMINOPHEN 1000 MG: 500 TABLET ORAL at 22:18

## 2020-03-12 RX ADMIN — NEOMYCIN SULFATE, POLYMYXIN B SULFATE AND DEXAMETHASONE 1 DROP: 3.5; 10000; 1 SUSPENSION OPHTHALMIC at 06:25

## 2020-03-12 RX ADMIN — CLOPIDOGREL BISULFATE 75 MG: 75 TABLET ORAL at 08:40

## 2020-03-12 RX ADMIN — QUETIAPINE FUMARATE 25 MG: 25 TABLET ORAL at 22:18

## 2020-03-12 RX ADMIN — LATANOPROST 1 DROP: 50 SOLUTION OPHTHALMIC at 22:20

## 2020-03-12 RX ADMIN — NEOMYCIN SULFATE, POLYMYXIN B SULFATE AND DEXAMETHASONE 1 DROP: 3.5; 10000; 1 SUSPENSION OPHTHALMIC at 17:03

## 2020-03-12 RX ADMIN — ENOXAPARIN SODIUM 40 MG: 40 INJECTION SUBCUTANEOUS at 08:37

## 2020-03-12 RX ADMIN — ASPIRIN 81 MG 81 MG: 81 TABLET ORAL at 22:18

## 2020-03-12 RX ADMIN — METOPROLOL TARTRATE 50 MG: 50 TABLET, FILM COATED ORAL at 22:18

## 2020-03-12 RX ADMIN — CARBIDOPA AND LEVODOPA 1 TABLET: 25; 100 TABLET ORAL at 22:18

## 2020-03-12 RX ADMIN — ROSUVASTATIN CALCIUM 10 MG: 5 TABLET, COATED ORAL at 22:18

## 2020-03-12 RX ADMIN — CARBIDOPA AND LEVODOPA 1 TABLET: 25; 100 TABLET ORAL at 17:03

## 2020-03-12 RX ADMIN — LISINOPRIL 20 MG: 20 TABLET ORAL at 17:03

## 2020-03-12 RX ADMIN — DOCUSATE SODIUM 100 MG: 100 CAPSULE, LIQUID FILLED ORAL at 08:39

## 2020-03-12 RX ADMIN — CARBIDOPA AND LEVODOPA 1 TABLET: 25; 100 TABLET ORAL at 12:23

## 2020-03-12 RX ADMIN — METFORMIN HYDROCHLORIDE 500 MG: 500 TABLET ORAL at 22:18

## 2020-03-12 RX ADMIN — MENTHOL AND METHYL SALICYLATE: 7.6; 29 OINTMENT TOPICAL at 22:20

## 2020-03-12 RX ADMIN — Medication 100 MG: at 08:38

## 2020-03-12 RX ADMIN — Medication 100 MG: at 12:23

## 2020-03-12 RX ADMIN — HYDROCODONE BITARTRATE AND ACETAMINOPHEN 1 TABLET: 5; 325 TABLET ORAL at 22:18

## 2020-03-12 RX ADMIN — CARBIDOPA AND LEVODOPA 1 TABLET: 25; 100 TABLET ORAL at 08:38

## 2020-03-12 ASSESSMENT — PAIN SCALES - WONG BAKER: WONGBAKER_NUMERICALRESPONSE: 0

## 2020-03-12 ASSESSMENT — PAIN SCALES - GENERAL
PAINLEVEL_OUTOF10: 2
PAINLEVEL_OUTOF10: 5
PAINLEVEL_OUTOF10: 0
PAINLEVEL_OUTOF10: 4

## 2020-03-12 ASSESSMENT — PAIN DESCRIPTION - LOCATION: LOCATION: NECK

## 2020-03-12 ASSESSMENT — PAIN DESCRIPTION - PAIN TYPE: TYPE: CHRONIC PAIN

## 2020-03-12 NOTE — PROGRESS NOTES
Pt sleeping tonight without any issues. Avasys in place. PT did walk tonight to toilet with wheeled walker to toilet with one assist. Left upper arm stared bleeding were old scab from skin tear was. Area cleansed and redressed. Pt's eyes crusty from drainage. Eyes cleansed with warm washcloth and hs eye drops were  applied. Will monitor on rounds, Call light in reach and bed alarm on. Electronically signed by Glo Estrada.  April Lisa on 3/12/2020 at 1:32 AM

## 2020-03-12 NOTE — PROGRESS NOTES
min cues. No significant change in deep tendon reflexes or     sensation severe Parkinson's rigidity  Lungs:  Diminished CTA-B. Respiration effort is normal at rest.     Heart:   S1 = S2, RRR. No loud murmurs. Abdomen:  Soft, non-tender, no enlargement of liver or spleen. Extremities:  No significant lower extremity edema or tenderness. Skin:   Intact to general survey, no visualized or palpated problems. Rehabilitation:  Physical therapy: FIMS:  Bed Mobility: Scooting: Moderate assistance    Transfers: Sit to Stand: Contact guard assistance, Minimal Assistance  Stand to sit: Contact guard assistance  Bed to Chair: Moderate assistance  Squat Pivot Transfers: Moderate Assistance, Ambulation 1  Surface: carpet  Device: Rollator  Assistance: Contact guard assistance  Quality of Gait: Fwd flexed posture with downward gaze, festinating gait pattern that increases with turns and approach to chair. Gait Deviations: Slow Brenda, Decreased step length, Decreased step height, Shuffles  Distance: 50' x 2   Comments: distance limited by fatigue and destination, vc's for approach to chair as pt tends to turn too soon, Stairs  Curbs: 2\"  Device: 4 wheeled walker  Assistance: Minimal assistance  Comment: Min A for cues for proper sequencing and assistance to get front wheels of Rollator up on curb. No LOB at this attempt. FIMS:  ,  , Assessment: Pt continues to required heavy cues for ambulation with increased difficutly with turns and approach to chair. Occupational therapy: FIMS:   ,  , Assessment: Pt is an 81 y/o male from home with spouse who presents to [address] [facility] with the above deficits which impact his ability to independently perform ADLs and IADLs. Pt would benefit from OT services to maximize independence and safety during self care.     Speech therapy: FIMS:        Lab/X-ray studies reviewed, analyzed and discussed with patient and staff:   Recent Results (from the past 24 hour(s))   POCT Glucose (please also verify by checking MAR)     Today I evaluated this patient for periodic reassessment of medical and functional status. The patient was discussed in detail at the treatment team meeting focusing on current medical issues, progress in therapies, social issues, psychological issues, barriers to progress and strategies to address these barriers, and discharge planning. See the hand written addendum to rehab progress note. The patient continues to be high risk for future disability and their medical and rehabilitation prognosis continue to be good and therefore, we will continue the patient's rehabilitation course as planned. The patient's tentative discharge date was set. Patient and family education was discussed. The patient was made aware of the team discussion regarding their progress. Complex Physical Medicine & Rehab Issues Assess & Plan:   1. Severe abnormality of gait and mobility and impaired self-care and ADL's secondary to progressive Parkinson's disease. Functional and medical status reassessed regarding patients ability to participate in therapies and patient found to be able to participate in acute intensive comprehensive inpatient rehabilitation program including PT/OT to improve balance, ambulation, ADLs, and to improve the P/AROM. Therapeutic modifications regarding activities in therapies, place, amount of time per day and intensity of therapy made daily. In bed therapies or bedside therapies prn.   2. Bowel severe parkinsonian related constipation and Bladder dysfunction:  frequent toileting, ambulate to bathroom with assistance, check post void residuals. Check for C.difficile x1 if >2 loose stools in 24 hours, continue bowel & bladder program.  Monitor bowel and bladder function. Lactinex 2 PO every AC. MOM prn, Brown Bomb prn, Glycerin suppository prn, enema prn. Add scheduled milk of magnesia consider lactulose and fleets enema. Increase fruits in diet.    Adjust lactulose twice daily-to PRN as he is now having loose stools  3. Severe Parkinson's related muscle stiffness in neck and back pain as well as generalized OA pain: reassess pain every shift and prior to and after each therapy session, give prn Tylenol and Norco with caution, modalities prn in therapy, Lidoderm, K-pad prn.   4. Skin healing and breakdown risk: Monitor closely because of rigidity from Parkinson's disease continue side-to-side turns. Continue pressure relief program.  Daily skin exams and reports from nursing. 5. Severe fatigue due to nutritional and hydration deficiency: Titrate vitamin B12 vitamin D and CoQ10 continue to monitor I&Os, calorie counts prn, dietary consult prn.  6. Acute episodic insomnia with situational adjustment disorder: Scheduled Tylenol at at bedtime, toilet prior to bedtime, consider eggcrate mattress prn Ambien, monitor for day time sedation. 7. Falls risk elevated: Add video monitor as patient is impulsive monitor for any signs of injury as patient was impulsive and fell on 3/5/2020. So far no signs of injury. Patient to use call light to get nursing assistance to get up, bed and chair alarm. Increased frequency of nursing checks and cueing for safety. 8. Elevated DVT risk: progressive activities in PT, continue prophylaxis LINDEN hose, elevation and Lovenox. 9. Complex discharge planning: Discharge 3/13/2020 to a skilled level of care or transitioning to long-term care hopefully at the [facility] he had previously been living home with his wife with help from his daughters who live locally. He will likely need 24-hour care at home. Until then continue weekly team meeting every Thursdays to assess progress towards goals, discuss and address social, psychological and medical comorbidities and to address difficulties they may be having progressing in therapy. Patient and family education is in progress.   The patient is to follow-up with their family physician after discharge. Complex Active General Medical Issues that complicate care Assess & Plan:    1. Vitamin D deficiency-add high-dose vitamin D  2. Depression anxiety-add rec therapy rehabilitation psychology emotional support to be given daily titrate Zoloft  3. Hypothyroidism-titrate Synthroid  4. Spinal stenosis of lumbar region with neurogenic claudication, Thoracic back pain, Low back pain with sciatica-Lidoderm, egg crate, lowest effected dose of Norco  5. Type 2 diabetes mellitus without retinopathy and hyperglycemia-fingerstick blood sugar checks q. before meals and at bedtime dose and titrate diabetic medications to include Humalog and Glucophage, add diabetic add dietary add and titrate insulin and carbohydrate intake add ADA restriction to diet  6. Vertigo-titrate Antivert with caution due to dry mouth  7. Essential hypertension, Mixed hyperlipidemia-vital signs every shift dose and titrate cardiac medications to include Plavix, Norvasc, Zestril, Lopressor, Nitrostat, Crestor, aspirin  8.  Sun downing-add to talk in improve sleep hygiene add Tylenol at at bedtime    Patient and his wife wished him to be DNR POORNIMA Mehta D.O., PM&R     Attending    [address]

## 2020-03-12 NOTE — PROGRESS NOTES
fall risk precautions in place      [address] [facility] ([facility]):    SWALLOWING  Rating:  DNT    SPOKEN LANGUAGE COMPREHENSION  Ratin    SPOKEN LANGUAGE EXPRESSION  Rating:  DNT    MOTOR SPEECH  Rating:  DNT    PROBLEM SOLVING  Ratin    MEMORY  Rating:  DNT    Therapy Time   Time in: 1000   Time out: 1030   Total Minutes: 30 Minutes      Signature: Electronically signed by Adeola Scott.  Student SLP, on 3/12/2020 at 12:20 PM

## 2020-03-12 NOTE — PROGRESS NOTES
weeks  Current Treatment Recommendations: Strengthening, Balance Training, Functional Mobility Training, Endurance Training, Patient/Caregiver Education & Training, Equipment Evaluation, Education, & procurement, Safety Education & Training, Self-Care / ADL, Home Management Training, Positioning, Neuromuscular Re-education, Cognitive/Perceptual Training  Plan Comment: Con't per OT POC for d/c on 3-17-20  G-Code     OutComes Score                                                  AM-PAC Score             Goals  Patient Goals   Patient goals :  \"To get stronger and walk better to go home\"       Therapy Time   Individual Concurrent Group Co-treatment   Time In 1030         Time Out 1100         Minutes 30              Therapeutic activities: 30 minutes    [address], OTR/L Electronically signed by [address] OTR/L on 5/10/48 at 1:30 PM EDT

## 2020-03-13 VITALS
WEIGHT: 140.7 LBS | DIASTOLIC BLOOD PRESSURE: 70 MMHG | RESPIRATION RATE: 18 BRPM | SYSTOLIC BLOOD PRESSURE: 139 MMHG | BODY MASS INDEX: 23.44 KG/M2 | HEIGHT: 65 IN | TEMPERATURE: 97 F | HEART RATE: 61 BPM | OXYGEN SATURATION: 94 %

## 2020-03-13 LAB
GLUCOSE BLD-MCNC: 93 MG/DL (ref 60–115)
PERFORMED ON: NORMAL

## 2020-03-13 PROCEDURE — 6360000002 HC RX W HCPCS: Performed by: PHYSICAL MEDICINE & REHABILITATION

## 2020-03-13 PROCEDURE — 6370000000 HC RX 637 (ALT 250 FOR IP): Performed by: INTERNAL MEDICINE

## 2020-03-13 PROCEDURE — 99238 HOSP IP/OBS DSCHRG MGMT 30/<: CPT | Performed by: PHYSICAL MEDICINE & REHABILITATION

## 2020-03-13 PROCEDURE — 97535 SELF CARE MNGMENT TRAINING: CPT

## 2020-03-13 PROCEDURE — 6370000000 HC RX 637 (ALT 250 FOR IP): Performed by: PHYSICAL MEDICINE & REHABILITATION

## 2020-03-13 RX ORDER — HYDROCODONE BITARTRATE AND ACETAMINOPHEN 5; 325 MG/1; MG/1
1 TABLET ORAL EVERY 6 HOURS PRN
Qty: 12 TABLET | Refills: 0 | Status: SHIPPED | OUTPATIENT
Start: 2020-03-13 | End: 2020-03-13 | Stop reason: HOSPADM

## 2020-03-13 RX ORDER — HYDROCODONE BITARTRATE AND ACETAMINOPHEN 5; 325 MG/1; MG/1
1 TABLET ORAL EVERY 8 HOURS PRN
Qty: 9 TABLET | Refills: 0 | Status: SHIPPED | OUTPATIENT
Start: 2020-03-13 | End: 2020-03-16

## 2020-03-13 RX ORDER — NEOMYCIN SULFATE, POLYMYXIN B SULFATE AND DEXAMETHASONE 3.5; 10000; 1 MG/ML; [USP'U]/ML; MG/ML
1 SUSPENSION/ DROPS OPHTHALMIC 2 TIMES DAILY
Qty: 1 ML | Refills: 0 | Status: SHIPPED | OUTPATIENT
Start: 2020-03-13 | End: 2020-03-20

## 2020-03-13 RX ORDER — UBIDECARENONE 100 MG
100 CAPSULE ORAL
Qty: 60 CAPSULE | Refills: 0 | Status: SHIPPED | OUTPATIENT
Start: 2020-03-13

## 2020-03-13 RX ORDER — HYDROCODONE BITARTRATE AND ACETAMINOPHEN 5; 325 MG/1; MG/1
1 TABLET ORAL EVERY 6 HOURS PRN
Qty: 12 TABLET | Refills: 0
Start: 2020-03-13 | End: 2020-03-13

## 2020-03-13 RX ADMIN — NEOMYCIN SULFATE, POLYMYXIN B SULFATE AND DEXAMETHASONE 1 DROP: 3.5; 10000; 1 SUSPENSION OPHTHALMIC at 06:06

## 2020-03-13 RX ADMIN — CLOPIDOGREL BISULFATE 75 MG: 75 TABLET ORAL at 10:10

## 2020-03-13 RX ADMIN — AMANTADINE HYDROCHLORIDE 100 MG: 100 CAPSULE ORAL at 10:09

## 2020-03-13 RX ADMIN — NEOMYCIN SULFATE, POLYMYXIN B SULFATE AND DEXAMETHASONE 1 DROP: 3.5; 10000; 1 SUSPENSION OPHTHALMIC at 13:07

## 2020-03-13 RX ADMIN — HYDROCODONE BITARTRATE AND ACETAMINOPHEN 1 TABLET: 5; 325 TABLET ORAL at 10:10

## 2020-03-13 RX ADMIN — PANTOPRAZOLE SODIUM 40 MG: 40 TABLET, DELAYED RELEASE ORAL at 05:37

## 2020-03-13 RX ADMIN — Medication 100 MG: at 13:07

## 2020-03-13 RX ADMIN — METOPROLOL TARTRATE 50 MG: 50 TABLET, FILM COATED ORAL at 10:10

## 2020-03-13 RX ADMIN — CARBIDOPA AND LEVODOPA 1 TABLET: 25; 100 TABLET ORAL at 13:07

## 2020-03-13 RX ADMIN — ENOXAPARIN SODIUM 40 MG: 40 INJECTION SUBCUTANEOUS at 10:11

## 2020-03-13 RX ADMIN — MENTHOL AND METHYL SALICYLATE: 7.6; 29 OINTMENT TOPICAL at 13:07

## 2020-03-13 RX ADMIN — SERTRALINE HYDROCHLORIDE 25 MG: 25 TABLET ORAL at 10:10

## 2020-03-13 RX ADMIN — CARBIDOPA AND LEVODOPA 1 TABLET: 25; 100 TABLET ORAL at 10:11

## 2020-03-13 RX ADMIN — LISINOPRIL 20 MG: 20 TABLET ORAL at 10:10

## 2020-03-13 RX ADMIN — METFORMIN HYDROCHLORIDE 500 MG: 500 TABLET ORAL at 10:10

## 2020-03-13 RX ADMIN — SELEGILINE HYDROCHLORIDE 5 MG: 5 CAPSULE ORAL at 10:09

## 2020-03-13 RX ADMIN — MENTHOL AND METHYL SALICYLATE: 7.6; 29 OINTMENT TOPICAL at 10:12

## 2020-03-13 RX ADMIN — AMLODIPINE BESYLATE 5 MG: 5 TABLET ORAL at 10:09

## 2020-03-13 RX ADMIN — NEOMYCIN SULFATE, POLYMYXIN B SULFATE AND DEXAMETHASONE 1 DROP: 3.5; 10000; 1 SUSPENSION OPHTHALMIC at 00:28

## 2020-03-13 RX ADMIN — Medication 100 MG: at 10:09

## 2020-03-13 RX ADMIN — LEVOTHYROXINE SODIUM 25 MCG: 25 TABLET ORAL at 05:37

## 2020-03-13 RX ADMIN — DOCUSATE SODIUM 100 MG: 100 CAPSULE, LIQUID FILLED ORAL at 10:10

## 2020-03-13 ASSESSMENT — PAIN SCALES - GENERAL
PAINLEVEL_OUTOF10: 4
PAINLEVEL_OUTOF10: 4

## 2020-03-13 NOTE — DISCHARGE SUMMARY
Subjective: The patient complains of severe  acute on chronic progressive Parkinson's disease with confusion partially relieved by Parkinson's medication titration, PT, OT, speech and language pathology, hydration and exacerbated by recent medical illness and dehydration. I am concerned about patients blood sugars may benefit from carb controlled diet progressing to ADA restriction. Patient was enrolled in an acute course Rehab program and progressed well-but still needs skilled level rehab to address their mobility and ADL adeficits. Discharge is set-Patient will be discharged Skilled nursing facility to complete their rehabilitation process and achieve greater independence. He is plateaued in therapy and is for [facility]. ROS x10: The patient also complains of severely impaired mobility and activities of daily living. Otherwise no new problems with vision, hearing, nose, mouth, throat, dermal, cardiovascular, GI, , pulmonary, musculoskeletal, psychiatric or neurological. See Rehab H&P on Rehab chart dated . Vital signs:  /70   Pulse 61   Temp 97 °F (36.1 °C) (Oral)   Resp 18   Ht 5' 5\" (1.651 m)   Wt 140 lb 11.2 oz (63.8 kg)   SpO2 94%   BMI 23.41 kg/m²   I/O:   PO/Intake:  fair PO intake, no problems observed or reported. Bowel/Bladder: Severe constipation now with loose stools due to laxatives continent, toilet frequently assist with nursing  General:  Patient is well developed, adequately nourished, non-obese and     well kempt. HEENT:    PERRLA, hearing intact to loud voice, external inspection of ear     and nose benign. Inspection of lips, tongue and gums benign  Musculoskeletal: No significant change in strength or tone. All joints stable. Inspection and palpation of digits and nails show no clubbing,       cyanosis or inflammatory conditions. Neuro/Psychiatric: Affect: flat but pleasant.   Alert and oriented to person, place and     situation with min cues. No significant change in deep tendon reflexes or     sensation severe Parkinson's rigidity  Lungs:  Diminished CTA-B. Respiration effort is normal at rest.     Heart:   S1 = S2, RRR. No loud murmurs. Abdomen:  Soft, non-tender, no enlargement of liver or spleen. Extremities:  No significant lower extremity edema or tenderness. Skin:   Intact to general survey, no visualized or palpated problems. Rehabilitation:  Physical therapy: FIMS:  Bed Mobility: Scooting: Minimal assistance    Transfers: Sit to Stand: Minimal Assistance(pt slightly retro with standing )  Stand to sit: Minimal Assistance(assist to guide hips to chair and proper approach to chair needed)  Bed to Chair: Minimal assistance  Squat Pivot Transfers: Moderate Assistance, Ambulation 1  Surface: carpet, uneven, level tile, ramp  Device: Rollator  Assistance: Minimal assistance, Moderate assistance  Quality of Gait: FF posture, downward gaze, festinating gait that increases with ascending ramp,turns, and approach to chair, VC for bigger steps with short lived carry over, inconsistent roberto carlos. Gait Deviations: Slow Roberto Carlos, Decreased step length, Decreased step height, Shuffles  Distance: 100' with seated rest break on rollator once pt fatigued. then another 80' with rest break to follow   Comments: pt needed assistance ascending ramp as he started to significantly shuffle his feet and roberto carlso also decreased significantly. increased fatigue with ramp. TC for proximity to rollator with decent of ramp. fatigued following needing a rest break , Stairs  Curbs: 4\"  Device: (rollator)  Assistance: Moderate assistance  Comment: Mod A and VC/TC needed for proper sequencing and assistance for rollator placement on curb. no LOB but increased effort needed to perform    FIMS:  ,  , Assessment: Pt continues to required heavy cues for ambulation with increased difficutly with turns and approach to chair. chest obtained. Comparison: Portable chest radiograph from August 11, 2017 Findings: Atherosclerotic calcification of the thoracic aorta. The cardiomediastinal silhouette is within normal limits. No pneumothorax, pleural effusion, or consolidation. Several bilateral calcified pleural plaques again identified. No acute osseous abnormality. No acute intrathoracic process. Several bilateral calcified pleural plaques again identified. Previous extensive, complex labs, notes and diagnostics reviewed and analyzed. ALLERGIES:    Allergies as of 03/02/2020    (No Known Allergies)      (please also verify by checking MAR)      Yesterday I evaluated this patient for periodic reassessment of medical and functional status. The patient was discussed in detail at the treatment team meeting focusing on current medical issues, progress in therapies, social issues, psychological issues, barriers to progress and strategies to address these barriers, and discharge planning. See the hand written addendum to rehab progress note. The patient continues to be high risk for future disability and their medical and rehabilitation prognosis continue to be good and therefore, we will continue the patient's rehabilitation course as planned. The patient's tentative discharge date was set. Patient and family education was discussed. The patient was made aware of the team discussion regarding their progress. Discharge plans were discussed along with barriers to progress and strategies to address these barriers, patient encouraged to continue to discuss discharge plans with . Complex Physical Medicine & Rehab Issues Assess & Plan:   1. Severe abnormality of gait and mobility and impaired self-care and ADL's secondary to progressive Parkinson's disease.   Functional and medical status have stabilized and plateaued after acute rehab at [facility].  2. Bowel severe parkinsonian related constipation and

## 2020-03-13 NOTE — PROGRESS NOTES
liquids    SWALLOWING  Ratin    SPOKEN LANGUAGE COMPREHENSION  Ratin    SPOKEN LANGUAGE EXPRESSION  Ratin    PROBLEM SOLVING  Ratin    MEMORY  Rating: 3      Functional Status at time of Discharge:    · Cognition: Patient demonstrates moderate cognitive deficits. · Communication: Patient demonstrates minimal communication deficits. · Language: Patient demonstrates minimal language deficits. · Swallow: Patient demonstrates no dysphagia.                                  Patient is discharged to Skilled nursing facility               [] Recommend continued speech therapy   [x] Speech Therapy is no longer warranted      Signature:  Jori Cowden, CCC-SLP, Date: 3/13/2020, Time: 1:50 PM

## 2020-03-15 ENCOUNTER — OFFICE VISIT (OUTPATIENT)
Dept: GERIATRIC MEDICINE | Age: 85
End: 2020-03-15
Payer: MEDICARE

## 2020-03-15 PROCEDURE — G8484 FLU IMMUNIZE NO ADMIN: HCPCS | Performed by: FAMILY MEDICINE

## 2020-03-15 PROCEDURE — 1123F ACP DISCUSS/DSCN MKR DOCD: CPT | Performed by: FAMILY MEDICINE

## 2020-03-15 PROCEDURE — 99306 1ST NF CARE HIGH MDM 50: CPT | Performed by: FAMILY MEDICINE

## 2020-03-16 ENCOUNTER — TELEPHONE (OUTPATIENT)
Dept: NEUROLOGY | Age: 85
End: 2020-03-16

## 2020-03-16 LAB
ALBUMIN SERPL-MCNC: 4.2 G/DL (ref 3.5–4.6)
ALP BLD-CCNC: 64 U/L (ref 35–104)
ALT SERPL-CCNC: 7 U/L (ref 0–41)
ANION GAP SERPL CALCULATED.3IONS-SCNC: 12 MEQ/L (ref 9–15)
AST SERPL-CCNC: 23 U/L (ref 0–40)
BILIRUB SERPL-MCNC: 0.4 MG/DL (ref 0.2–0.7)
BUN BLDV-MCNC: 34 MG/DL (ref 8–23)
CALCIUM SERPL-MCNC: 10.2 MG/DL (ref 8.5–9.9)
CHLORIDE BLD-SCNC: 104 MEQ/L (ref 95–107)
CO2: 24 MEQ/L (ref 20–31)
CREAT SERPL-MCNC: 1.55 MG/DL (ref 0.7–1.2)
GFR AFRICAN AMERICAN: 51.3
GFR NON-AFRICAN AMERICAN: 42.4
GLOBULIN: 2.9 G/DL (ref 2.3–3.5)
GLUCOSE BLD-MCNC: 140 MG/DL (ref 70–99)
HCT VFR BLD CALC: 42.8 % (ref 42–52)
HEMOGLOBIN: 14.4 G/DL (ref 14–18)
MCH RBC QN AUTO: 28.8 PG (ref 27–31.3)
MCHC RBC AUTO-ENTMCNC: 33.6 % (ref 33–37)
MCV RBC AUTO: 85.9 FL (ref 80–100)
PDW BLD-RTO: 15.1 % (ref 11.5–14.5)
PLATELET # BLD: 227 K/UL (ref 130–400)
POTASSIUM SERPL-SCNC: 4.9 MEQ/L (ref 3.4–4.9)
RBC # BLD: 4.98 M/UL (ref 4.7–6.1)
SODIUM BLD-SCNC: 140 MEQ/L (ref 135–144)
T4 FREE: 1.23 NG/DL (ref 0.84–1.68)
TOTAL PROTEIN: 7.1 G/DL (ref 6.3–8)
TSH SERPL DL<=0.05 MIU/L-ACNC: 2.47 UIU/ML (ref 0.44–3.86)
VITAMIN D 25-HYDROXY: 12.3 NG/ML (ref 30–100)
WBC # BLD: 8.4 K/UL (ref 4.8–10.8)

## 2020-03-16 NOTE — TELEPHONE ENCOUNTER
[facility] [address] [facility]. Angelica Dillard the nurse there states that patient was taken off seroquel when he was there. She states since admission to nursing home he has been combative and had to be a 1 to 1 every day. Wants to know if he can possibly be a candidate for nuplazid. Please advise.

## 2020-03-19 LAB
ANION GAP SERPL CALCULATED.3IONS-SCNC: 13 MEQ/L (ref 9–15)
BUN BLDV-MCNC: 36 MG/DL (ref 8–23)
CALCIUM SERPL-MCNC: 9.7 MG/DL (ref 8.5–9.9)
CHLORIDE BLD-SCNC: 102 MEQ/L (ref 95–107)
CO2: 21 MEQ/L (ref 20–31)
CREAT SERPL-MCNC: 1.52 MG/DL (ref 0.7–1.2)
GFR AFRICAN AMERICAN: 52.5
GFR NON-AFRICAN AMERICAN: 43.4
GLUCOSE BLD-MCNC: 153 MG/DL (ref 70–99)
POTASSIUM SERPL-SCNC: 5.1 MEQ/L (ref 3.4–4.9)
SODIUM BLD-SCNC: 136 MEQ/L (ref 135–144)

## 2020-03-24 ENCOUNTER — VIRTUAL VISIT (OUTPATIENT)
Dept: GERIATRIC MEDICINE | Age: 85
End: 2020-03-24
Payer: MEDICARE

## 2020-03-24 PROCEDURE — 1123F ACP DISCUSS/DSCN MKR DOCD: CPT | Performed by: NURSE PRACTITIONER

## 2020-03-24 PROCEDURE — 99308 SBSQ NF CARE LOW MDM 20: CPT | Performed by: NURSE PRACTITIONER

## 2020-03-26 LAB
ANION GAP SERPL CALCULATED.3IONS-SCNC: 12 MEQ/L (ref 9–15)
BUN BLDV-MCNC: 34 MG/DL (ref 8–23)
CALCIUM SERPL-MCNC: 9.5 MG/DL (ref 8.5–9.9)
CHLORIDE BLD-SCNC: 102 MEQ/L (ref 95–107)
CO2: 25 MEQ/L (ref 20–31)
CREAT SERPL-MCNC: 1.13 MG/DL (ref 0.7–1.2)
GFR AFRICAN AMERICAN: >60
GFR NON-AFRICAN AMERICAN: >60
GLUCOSE BLD-MCNC: 131 MG/DL (ref 70–99)
POTASSIUM SERPL-SCNC: 4.5 MEQ/L (ref 3.4–4.9)
SODIUM BLD-SCNC: 139 MEQ/L (ref 135–144)

## 2020-03-27 VITALS
HEIGHT: 60 IN | SYSTOLIC BLOOD PRESSURE: 100 MMHG | DIASTOLIC BLOOD PRESSURE: 50 MMHG | WEIGHT: 139.8 LBS | BODY MASS INDEX: 27.45 KG/M2 | OXYGEN SATURATION: 100 % | RESPIRATION RATE: 16 BRPM | HEART RATE: 61 BPM | TEMPERATURE: 97.1 F

## 2020-03-27 NOTE — PROGRESS NOTES
tablet twice daily; Lopressor 25 mg twice daily; nitroglycerin/Nitrostat 0.3 mg tablet sublingual place 1 under the tongue q. 5 minutes x 3 for chest pain, Protonix 40 mg q.h.s., Requip 2 mg tablet nightly; selegiline 5 mg tablet once daily; Zoloft 25 mg tablet once daily; timolol 0.5% ophthalmic solution place one drop into both eyes daily, travoprost 0.004% ophthalmic place 1 drop into both eyes bilaterally, vitamin D 2000 unit capsule one capsule by mouth once daily. REVIEW OF SYSTEMS:  The patient has a history of syncopal and ataxic gait, but denies having dizziness prior to him having a fall. He does have an abnormal gait and chronic low back and mid back pain, but denies having been multiple episodes of falls; according to him he only had one fall. His appetite is fair and denies having odynophagia, dysphagia, Nausea, vomiting, abdominal pain, diarrhea, constipation, pedal edema, skin rash, depression, anxiety or delusion. However staff member reports that patient has been seen exhibiting features suggestive of having hallucination. PHYSICAL EXAMINATION:  His vital signs include blood pressure 100/50 mmHg, heart rate is 61 beats per minute, respiratory rate is 16 per minute, temperature is 97.1 and he is saturating at 100% on room air. He is 5 feet 5 inches tall. Weight is 139.8 pounds. The patient is thin and chronically ill-appearing elderly  male, who is alert and not pale or febrile to the touch. Head is normocephalic and atraumatic with pupils equal, round, and reactive to light and accommodation. He has moist oral mucosa. Neck is supple without JVD or palpable thyromegaly. Lungs are clear to auscultations bilaterally. He has audible heart Tones, S1 and S2, with regular rate and rhythm; questionable systolic ejection murmur. Abdomen is round soft, nontender. There is no joint swelling or pedal edema, but there are arthritic change is noted in the fingers of the hand.   Skin

## 2020-03-31 VITALS
RESPIRATION RATE: 14 BRPM | TEMPERATURE: 97.5 F | HEART RATE: 55 BPM | DIASTOLIC BLOOD PRESSURE: 80 MMHG | OXYGEN SATURATION: 98 % | SYSTOLIC BLOOD PRESSURE: 116 MMHG

## 2020-04-10 ENCOUNTER — VIRTUAL VISIT (OUTPATIENT)
Dept: GERIATRIC MEDICINE | Age: 85
End: 2020-04-10
Payer: MEDICARE

## 2020-04-10 PROCEDURE — 1123F ACP DISCUSS/DSCN MKR DOCD: CPT | Performed by: NURSE PRACTITIONER

## 2020-04-10 PROCEDURE — 99309 SBSQ NF CARE MODERATE MDM 30: CPT | Performed by: NURSE PRACTITIONER

## 2020-04-15 VITALS
DIASTOLIC BLOOD PRESSURE: 73 MMHG | HEART RATE: 61 BPM | TEMPERATURE: 97.7 F | RESPIRATION RATE: 18 BRPM | SYSTOLIC BLOOD PRESSURE: 110 MMHG | OXYGEN SATURATION: 98 %

## 2020-04-15 PROBLEM — M62.81 GENERALIZED MUSCLE WEAKNESS: Status: ACTIVE | Noted: 2020-04-15
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
## 2020-04-15 LAB
BACTERIA: NEGATIVE /HPF
BILIRUBIN URINE: NEGATIVE
BLOOD, URINE: NEGATIVE
CLARITY: CLEAR
COLOR: YELLOW
EPITHELIAL CELLS, UA: ABNORMAL /HPF (ref 0–5)
GLUCOSE URINE: NEGATIVE MG/DL
HYALINE CASTS: ABNORMAL /HPF (ref 0–5)
KETONES, URINE: ABNORMAL MG/DL
LEUKOCYTE ESTERASE, URINE: NEGATIVE
NITRITE, URINE: NEGATIVE
PH UA: 6 (ref 5–9)
PROTEIN UA: 100 MG/DL
RBC UA: ABNORMAL /HPF (ref 0–5)
SPECIFIC GRAVITY UA: 1.02 (ref 1–1.03)
UROBILINOGEN, URINE: 1 E.U./DL
WBC UA: ABNORMAL /HPF (ref 0–5)

## 2020-04-15 NOTE — PROGRESS NOTES
deena    Due to this being a TeleHealth encounter, evaluation of the following organ systems is limited: Vitals/Constitutional/EENT/Resp/CV/GI//MS/Neuro/Skin/Heme-Lymph-Imm. ASSESSMENT/PLAN:  1. Parkinson's disease ([facility] [address]  Concern symptoms are at baseline. Continue medications as ordered. 2. Late onset Alzheimer's disease without behavioral disturbance ([facility] [address]  Alzheimer's disease is more advanced, resident does not remember his wife is , which occurred 1 week ago. Continue medications as ordered provide emotional support as needed. 3. Gait abnormality / Fall, subsequent encounter  Gait is still unsteady, balance is off, resident has had fall without injury. PT has been discontinued as of yesterday. 4. Generalized muscle weakness  Muscle weakness has improved. Power grade 3-4 out of 5 upper and lower extremities. Will initiate PT OT as needed in the future. 5. Essential hypertension  Blood pressure within acceptable range. Continue medications as ordered. 6. Chronic midline low back pain with right-sided sciatica  Resident does continue to complain of low back pain, generalized pain, arthritic pain. Continue PRN pain medications and Norco as ordered. An  electronic signature was used to authenticate this note. --FELICIANO Dykes CNP on 4/15/2020 at 2:20 PM    Reviewed labs:  Yes  }    Pursuant to the emergency declaration under the [facility] waiver authority and the [employer] Act, this Virtual  Visit was conducted, with patient's consent, to reduce the patient's risk of exposure to COVID-19 and provide continuity of care for an established patient. Services were provided through a video synchronous discussion virtually to substitute for in-person clinic visit.

## 2020-04-17 LAB — URINE CULTURE, ROUTINE: NORMAL

## 2020-04-23 ENCOUNTER — HOSPITAL ENCOUNTER (EMERGENCY)
Age: 85
Discharge: HOME | End: 2020-04-23
Attending: STUDENT IN AN ORGANIZED HEALTH CARE EDUCATION/TRAINING PROGRAM
Payer: MEDICARE

## 2020-04-23 ENCOUNTER — APPOINTMENT (OUTPATIENT)
Dept: CT IMAGING | Age: 85
End: 2020-04-23
Payer: MEDICARE

## 2020-04-23 ENCOUNTER — OFFICE VISIT (OUTPATIENT)
Dept: GERIATRIC MEDICINE | Age: 85
End: 2020-04-23
Payer: MEDICARE

## 2020-04-23 ENCOUNTER — APPOINTMENT (OUTPATIENT)
Dept: GENERAL RADIOLOGY | Age: 85
End: 2020-04-23
Payer: MEDICARE

## 2020-04-23 VITALS
OXYGEN SATURATION: 99 % | WEIGHT: 144.2 LBS | HEIGHT: 67 IN | SYSTOLIC BLOOD PRESSURE: 142 MMHG | BODY MASS INDEX: 22.63 KG/M2 | HEART RATE: 58 BPM | RESPIRATION RATE: 19 BRPM | TEMPERATURE: 97.5 F | DIASTOLIC BLOOD PRESSURE: 64 MMHG

## 2020-04-23 PROCEDURE — 70486 CT MAXILLOFACIAL W/O DYE: CPT

## 2020-04-23 PROCEDURE — 99309 SBSQ NF CARE MODERATE MDM 30: CPT | Performed by: INTERNAL MEDICINE

## 2020-04-23 PROCEDURE — 1123F ACP DISCUSS/DSCN MKR DOCD: CPT | Performed by: INTERNAL MEDICINE

## 2020-04-23 PROCEDURE — 99285 EMERGENCY DEPT VISIT HI MDM: CPT

## 2020-04-23 PROCEDURE — 70450 CT HEAD/BRAIN W/O DYE: CPT

## 2020-04-23 PROCEDURE — 73030 X-RAY EXAM OF SHOULDER: CPT

## 2020-04-23 PROCEDURE — 6830039000 HC L3 TRAUMA ALERT

## 2020-04-23 PROCEDURE — 72125 CT NECK SPINE W/O DYE: CPT

## 2020-04-23 ASSESSMENT — ENCOUNTER SYMPTOMS
EYE DISCHARGE: 0
RHINORRHEA: 0
SHORTNESS OF BREATH: 0
ABDOMINAL PAIN: 0
SORE THROAT: 0
ABDOMINAL DISTENTION: 0
COLOR CHANGE: 0
CONSTIPATION: 0

## 2020-04-23 ASSESSMENT — PAIN DESCRIPTION - LOCATION: LOCATION: NOSE;SHOULDER

## 2020-04-23 ASSESSMENT — PAIN SCALES - GENERAL: PAINLEVEL_OUTOF10: 5

## 2020-04-23 NOTE — ED TRIAGE NOTES
Pt to ER [facility] from [facility] for c/o unwitnessed fall, per staff at [facility] a STNA saw pt out of the corner of their eyes stumble while ambulating in hallway and pt hit face on wall, pt is on plavix, squad vitals 58, 145/75, accucheck 189, 20G IV placed in LFA, pt arrives a&ox2, pt has c/o pain in his nose and left shoulder

## 2020-04-23 NOTE — ED PROVIDER NOTES
pain    LATANOPROST (XALATAN) 0.005 % OPHTHALMIC SOLUTION    Place 1 drop into both eyes nightly    LEVOTHYROXINE (SYNTHROID) 25 MCG TABLET    Take 25 mcg by mouth Daily    LIDOCAINE (LIDODERM)    Place 1 patch onto the skin nightly    LISINOPRIL (PRINIVIL;ZESTRIL) 10 MG TABLET    Take 20 mg by mouth 2 times daily     MECLIZINE (ANTIVERT) 25 MG CHEW    Take 12.5 mg by mouth as needed. METFORMIN HCL PO    Take 500 mg by mouth 2 times daily     METOPROLOL (LOPRESSOR) 25 MG TABLET    Take 50 mg by mouth 2 times daily 50mg in am, 25 mg in hs    NITROGLYCERIN (NITROSTAT) 0.3 MG SL TABLET    Place 0.3 mg under the tongue every 5 minutes as needed for Chest pain. PANTOPRAZOLE SODIUM (PROTONIX) 40 MG PACK PACKET    Take 40 mg by mouth every morning (before breakfast)    ROPINIROLE (REQUIP) 2 MG TABLET    Take 2 mg by mouth nightly    ROSUVASTATIN CALCIUM (CRESTOR PO)    Take 10 mg by mouth nightly     SELEGILINE (ELDEPRYL) 5 MG TABLET    TAKE ONE TABLET BY MOUTH DAILY    SERTRALINE (ZOLOFT) 25 MG TABLET    Take 1 tablet by mouth daily    TIMOLOL (BETIMOL) 0.5 % OPHTHALMIC SOLUTION    Place 1 drop into both eyes daily    TRAVOPROST, BENZALKONIUM, (TRAVATAN) 0.004 % OPHTHALMIC SOLUTION    Place 1 drop into both eyes nightly. ALLERGIES     Patient has no known allergies. FAMILY HISTORY     History reviewed. No pertinent family history.        SOCIAL HISTORY       Social History     Socioeconomic History    Marital status:      Spouse name: None    Number of children: None    Years of education: None    Highest education level: None   Occupational History    None   Social Needs    Financial resource strain: Not very hard    Food insecurity     Worry: Never true     Inability: Never true    Transportation needs     Medical: Yes     Non-medical: Yes   Tobacco Use    Smoking status: Former Smoker     Packs/day: 1.00     Years: 25.00     Pack years: 25.00     Types: Cigarettes     Last attempt to quit: Pulse Resp SpO2 Height Weight   04/23/20 1005 04/23/20 1005 04/23/20 1005 04/23/20 1005 04/23/20 1005 04/23/20 1005 04/23/20 1010 04/23/20 1010   (!) 146/68 97.5 °F (36.4 °C) Oral 56 19 99 % 5' 7\" (1.702 m) 144 lb 3.2 oz (65.4 kg)       Physical Exam  Constitutional:       General: He is not in acute distress. Appearance: He is well-developed. He is not ill-appearing, toxic-appearing or diaphoretic. HENT:      Head: Normocephalic. No raccoon eyes, Mota's sign, abrasion or contusion. Comments: Patient has no visible cut scrapes abrasions noted to head, he does have some areas of ecchymosis to the occipital region of the scalp, which are yellow in color. There is no pain on palpation. There is no depressions or deformity. Right Ear: Tympanic membrane normal.      Left Ear: Tympanic membrane normal.      Ears:      Comments: Tympanic membrane's are intact, no bleeding or discharge is noted from either ear. Nose: Nose normal.      Comments: Patient has minor deformity noted to the midportion of the bridge of the nose, there is no crepitus. There is minor bleeding noted to the left nares. There is no active bleeding at this time. Eyes:      Extraocular Movements: Extraocular movements intact. Pupils: Pupils are equal, round, and reactive to light. Neck:      Musculoskeletal: Neck supple. Vascular: No JVD. Trachea: No tracheal deviation. Comments: Neck is supple, there is no midline tenderness, no crepitus, no step-offs or deformity. Full range of motion with minimal pain. Patient states he does have pain to the SCM on the right side, no scrapes abrasions edema or ecchymosis is noted  Cardiovascular:      Rate and Rhythm: Normal rate. Pulmonary:      Effort: Pulmonary effort is normal. No respiratory distress. Breath sounds: No wheezing or rales.       Comments: Lung sounds are clear in all fields, no wheeze rales rhonchi heard, no accessory muscle use, no signs or Co-signsthis chart in the absence of a cardiologist.        RADIOLOGY:   Adaline Colander such as CT, Ultrasound and MRI are read by the radiologist. Plain radiographic images are visualized and preliminarily interpreted by the emergency physician with the below findings:    Left shoulder xray: no acute fx or subluxation    Interpretation per the Radiologist below, if available at the time ofthis note:    XR SHOULDER LEFT (MIN 2 VIEWS)   Final Result      No acute osseous abnormality. CT Head WO Contrast   Final Result      No acute intracranial process. All CT scans at this facility use dose modulation, iterative reconstruction, and/or weight based dosing when appropriate to reduce radiation dose to as low as reasonably achievable. CT FACIAL BONES WO CONTRAST   Final Result      No acute facial bone fracture. All CT scans at this facility use dose modulation, iterative reconstruction, and/or weight based dosing when appropriate to reduce radiation dose to as low as reasonably achievable. CT CERVICAL SPINE WO CONTRAST   Final Result   1. STRAIGHTENING OF THE C-SPINE MAY BE POSITIONAL OR FROM MUSCLE SPASMS IN NECK. 2. DEGENERATIVE AND ARTHRITIC CHANGES THROUGHOUT THE C-SPINE AS DESCRIBED. 3. NO ACUTE FRACTURE, SUBLUXATION OR DISLOCATION INVOLVING THE CERVICAL SPINE. All CT scans at this facility use dose modulation, iterative reconstruction, and/or weight based dosing when appropriate to reduce radiation dose to as low as reasonably achievable. ED BEDSIDE ULTRASOUND:   Performed by ED Physician - none    LABS:  Labs Reviewed - No data to display    All other labs were within normal range or not returned as of this dictation.     EMERGENCY DEPARTMENT COURSE and DIFFERENTIAL DIAGNOSIS/MDM:   Vitals:    Vitals:    04/23/20 1005 04/23/20 1010   BP: (!) 146/68    Pulse: 56    Resp: 19    Temp: 97.5 °F (36.4 °C)    TempSrc: Oral    SpO2: 99%    Weight:  144 lb

## 2020-04-24 ENCOUNTER — VIRTUAL VISIT (OUTPATIENT)
Dept: GERIATRIC MEDICINE | Age: 85
End: 2020-04-24
Payer: MEDICARE

## 2020-04-24 PROCEDURE — 99310 SBSQ NF CARE HIGH MDM 45: CPT | Performed by: NURSE PRACTITIONER

## 2020-04-24 PROCEDURE — 1123F ACP DISCUSS/DSCN MKR DOCD: CPT | Performed by: NURSE PRACTITIONER

## 2020-04-25 LAB
ANION GAP SERPL CALCULATED.3IONS-SCNC: 13 MEQ/L (ref 9–15)
BACTERIA: ABNORMAL /HPF
BASOPHILS ABSOLUTE: 0.1 K/UL (ref 0–0.2)
BASOPHILS RELATIVE PERCENT: 0.8 %
BILIRUBIN URINE: NEGATIVE
BLOOD, URINE: ABNORMAL
BUN BLDV-MCNC: 37 MG/DL (ref 8–23)
CALCIUM SERPL-MCNC: 10.3 MG/DL (ref 8.5–9.9)
CHLORIDE BLD-SCNC: 100 MEQ/L (ref 95–107)
CLARITY: ABNORMAL
CO2: 27 MEQ/L (ref 20–31)
COLOR: YELLOW
CREAT SERPL-MCNC: 1.6 MG/DL (ref 0.7–1.2)
CRYSTALS, UA: ABNORMAL /HPF
EOSINOPHILS ABSOLUTE: 0.4 K/UL (ref 0–0.7)
EOSINOPHILS RELATIVE PERCENT: 4.1 %
EPITHELIAL CELLS, UA: ABNORMAL /HPF (ref 0–5)
GFR AFRICAN AMERICAN: 49.4
GFR NON-AFRICAN AMERICAN: 40.8
GLUCOSE BLD-MCNC: 158 MG/DL (ref 70–99)
GLUCOSE URINE: NEGATIVE MG/DL
HCT VFR BLD CALC: 45 % (ref 42–52)
HEMOGLOBIN: 14.7 G/DL (ref 14–18)
HYALINE CASTS: ABNORMAL /HPF (ref 0–5)
KETONES, URINE: ABNORMAL MG/DL
LEUKOCYTE ESTERASE, URINE: ABNORMAL
LYMPHOCYTES ABSOLUTE: 1 K/UL (ref 1–4.8)
LYMPHOCYTES RELATIVE PERCENT: 12 %
MCH RBC QN AUTO: 28.6 PG (ref 27–31.3)
MCHC RBC AUTO-ENTMCNC: 32.8 % (ref 33–37)
MCV RBC AUTO: 87.2 FL (ref 80–100)
MONOCYTES ABSOLUTE: 0.5 K/UL (ref 0.2–0.8)
MONOCYTES RELATIVE PERCENT: 6.1 %
NEUTROPHILS ABSOLUTE: 6.6 K/UL (ref 1.4–6.5)
NEUTROPHILS RELATIVE PERCENT: 77 %
NITRITE, URINE: NEGATIVE
PDW BLD-RTO: 15.2 % (ref 11.5–14.5)
PH UA: 5 (ref 5–9)
PLATELET # BLD: 237 K/UL (ref 130–400)
POTASSIUM SERPL-SCNC: 4.6 MEQ/L (ref 3.4–4.9)
PROTEIN UA: 30 MG/DL
RBC # BLD: 5.15 M/UL (ref 4.7–6.1)
RBC UA: ABNORMAL /HPF (ref 0–5)
SODIUM BLD-SCNC: 140 MEQ/L (ref 135–144)
SPECIFIC GRAVITY UA: 1.02 (ref 1–1.03)
UROBILINOGEN, URINE: 1 E.U./DL
WBC # BLD: 8.6 K/UL (ref 4.8–10.8)
WBC UA: ABNORMAL /HPF (ref 0–5)

## 2020-04-26 LAB
ORGANISM: ABNORMAL
URINE CULTURE, ROUTINE: ABNORMAL

## 2020-04-28 ENCOUNTER — VIRTUAL VISIT (OUTPATIENT)
Dept: GERIATRIC MEDICINE | Age: 85
End: 2020-04-28
Payer: MEDICARE

## 2020-04-28 LAB
ANION GAP SERPL CALCULATED.3IONS-SCNC: 18 MEQ/L (ref 9–15)
BUN BLDV-MCNC: 35 MG/DL (ref 8–23)
CALCIUM SERPL-MCNC: 8.7 MG/DL (ref 8.5–9.9)
CHLORIDE BLD-SCNC: 110 MEQ/L (ref 95–107)
CO2: 18 MEQ/L (ref 20–31)
CREAT SERPL-MCNC: 1.24 MG/DL (ref 0.7–1.2)
GFR AFRICAN AMERICAN: >60
GFR NON-AFRICAN AMERICAN: 54.8
GLUCOSE BLD-MCNC: 107 MG/DL (ref 70–99)
POTASSIUM SERPL-SCNC: 4.1 MEQ/L (ref 3.4–4.9)
SODIUM BLD-SCNC: 146 MEQ/L (ref 135–144)
VITAMIN D 25-HYDROXY: 28.1 NG/ML (ref 30–100)

## 2020-04-28 PROCEDURE — 99309 SBSQ NF CARE MODERATE MDM 30: CPT | Performed by: NURSE PRACTITIONER

## 2020-04-28 PROCEDURE — 1123F ACP DISCUSS/DSCN MKR DOCD: CPT | Performed by: NURSE PRACTITIONER

## 2020-04-30 ENCOUNTER — OFFICE VISIT (OUTPATIENT)
Dept: GERIATRIC MEDICINE | Age: 85
End: 2020-04-30
Payer: MEDICARE

## 2020-04-30 VITALS
HEIGHT: 67 IN | BODY MASS INDEX: 18.87 KG/M2 | HEART RATE: 69 BPM | WEIGHT: 120.2 LBS | RESPIRATION RATE: 18 BRPM | TEMPERATURE: 97.5 F | OXYGEN SATURATION: 99 % | DIASTOLIC BLOOD PRESSURE: 75 MMHG | SYSTOLIC BLOOD PRESSURE: 120 MMHG

## 2020-04-30 PROBLEM — F02.80 LATE ONSET ALZHEIMER'S DISEASE WITHOUT BEHAVIORAL DISTURBANCE (HCC): Status: ACTIVE | Noted: 2020-04-30

## 2020-04-30 PROBLEM — G47.00 INSOMNIA: Status: ACTIVE | Noted: 2020-04-30

## 2020-04-30 PROBLEM — G30.1 LATE ONSET ALZHEIMER'S DISEASE WITHOUT BEHAVIORAL DISTURBANCE (HCC): Status: ACTIVE | Noted: 2020-04-30

## 2020-04-30 PROBLEM — R63.4 WEIGHT LOSS: Status: ACTIVE | Noted: 2020-04-30

## 2020-04-30 PROBLEM — M25.512 ACUTE PAIN OF LEFT SHOULDER: Status: ACTIVE | Noted: 2020-04-30

## 2020-04-30 PROCEDURE — 1123F ACP DISCUSS/DSCN MKR DOCD: CPT | Performed by: INTERNAL MEDICINE

## 2020-04-30 PROCEDURE — 99304 1ST NF CARE SF/LOW MDM 25: CPT | Performed by: INTERNAL MEDICINE

## 2020-04-30 NOTE — PROGRESS NOTES
approximately a month ago; want to discuss hospice consult with family. He has participated in therapy, made some progress. Generalized muscle weakness and abnormal gait persists. Nursing staff report decreased appetite and insomnia. Resident has lost approximately 20 pounds this month. Also report that he has been hallucinating more than usual.  He denies any chest pain chest palpitations shortness of breath cough runny nose fever nausea vomiting lightheadedness. He does have dyspnea with exertion. Vital signs stable no fever. Resident remains a high falls risk due to problem with safety awareness. Has occasional pain of extremities and lower back, denies left shoulder pain; says pain level is 0 at this time. Parkinson's tremor is at baseline. Pharmacy is requesting a review of his medications due to multiple falls. Review of Systems Pertinent positives as noted in HPI.       Allergies:  Reviewed in nursing facility records via point click [facility]  Medications:  Reviewed and reconciled in nursing facility records via point click care      PHYSICAL EXAMINATION:  [ INSTRUCTIONS:  \"[x]\" Indicates a positive item  \"[]\" Indicates a negative item  -- DELETE ALL ITEMS NOT EXAMINED]    /75   Pulse 69   Temp 97.5 °F (36.4 °C)   Resp 18   Ht 5' 7\" (1.702 m)   Wt 120 lb 3.2 oz (54.5 kg)   SpO2 99%   BMI 18.83 kg/m²        [x] Alert  [] Oriented to person/place/time    [x] No apparent distress  [] Toxic appearing    [] Face flushed appearing [x] Sclera clear  [] Lips are cyanotic      [x] Breathing appears normal  [] Appears tachypneic      [] Rash on visible skin    [] Cranial Nerves II-XII grossly intact    [x] Motor grossly intact in visible upper extremities    [x] Motor grossly intact in visible lower extremities    [] Normal Mood  [] Anxious appearing    [x] Depressed appearing  [x] Confused appearing      [x] Poor short term memory  [x] Poor long term memory    [x] OTHER:      Physical Exam twice daily. DC amlodipine. Continue other medications as ordered. CBCD, BMP in a.m.    5. Chronic midline low back pain with right-sided sciatica / Acute pain left shoulder  Resident complaining of 0/10 pain today. Has small contusion to left shoulder after fall. Discontinue Norco.  Use Tylenol as needed for pain. 6.  Weight loss  Has lost approximately 20 pounds within the last 3 to 4 weeks. Resident has decreased appetite per nursing staff. Dietitian has been consulted and has made recommendations for protein supplementation. Nursing staff report that they have noticed this decline since his wife . Hospice consult if okay with family due to functional decline and decrease in overall health state. 7. Insomnia  Most falls occur during the night, resident is not sleeping well, nursing staff asking for sleep aid. Melatonin 6 mg p.o. nightly    An  electronic signature was used to authenticate this note. --Genevieve Carbone, APRN - CNP on 2020 at 1:05 PM    Reviewed labs:  Yes  }  Time based visit: Total time spent 35 minutes greater than 50% of time spent with counseling and coordination of care. Discussed with nursing staff and therapy staff resident's current clinical condition nursing and therapy needs. Reviewed medications and made adjustments per pharmacy recommendation. Consulted with family for potential hospice services. Pursuant to the emergency declaration under the [facility], [facility] waiver authority and the [employer] Act, this Virtual  Visit was conducted, with patient's consent, to reduce the patient's risk of exposure to COVID-19 and provide continuity of care for an established patient. Services were provided through a video synchronous discussion virtually to substitute for in-person clinic visit.

## 2020-05-01 VITALS
WEIGHT: 120.2 LBS | BODY MASS INDEX: 18.87 KG/M2 | HEART RATE: 67 BPM | OXYGEN SATURATION: 100 % | RESPIRATION RATE: 16 BRPM | HEIGHT: 67 IN | TEMPERATURE: 98.4 F | SYSTOLIC BLOOD PRESSURE: 189 MMHG | DIASTOLIC BLOOD PRESSURE: 83 MMHG

## 2020-05-01 PROBLEM — R62.7 FAILURE TO THRIVE IN ADULT: Status: ACTIVE | Noted: 2020-05-01

## 2020-05-01 NOTE — PROGRESS NOTES
Name: Otilia 1923: [facility] [address]   Thaddeus hatch  2020    TELEHEALTH EVALUATION -- Audio/Visual (During MDOMM-98 public health emergency)    Due to [place] 19 outbreak, patient's office visit was converted to a virtual visit. Patient was contacted and agreed to proceed with a virtual visit via \"Doxy. me. \"  The risks and benefits of converting to a virtual visit were discussed in light of the current infectious disease epidemic. Patient also understood that insurance coverage and co-pays are up to their individual insurance plans. Chief Complaint   Patient presents with    Follow-up     Chronic conditions, adult failure to thrive, decreased appetite, weight loss       HPI:  Car Quintero is a 80 y.o. male being seen today for an audio/video evaluation for the following concern(s):     Diagnosis Orders   1. Failure to thrive in adult     2. Weight loss     3. Parkinson's disease ([facility] [address]     4. Late onset Alzheimer's disease without behavioral disturbance ([facility] [address]     5. Gait abnormality       Nursing staff are concerned about resident's functional decline. Has lost significant weight over the past month, appetite is declined. He continues to fall despite aggressive efforts of nursing staff to keep him safe without restraints; gait abnormality persists. Parkinson's disease is at baseline on current medications. His Alzheimer's disease is more advanced. Vital signs have been stable. Labs have been within acceptable range while BUN and creatinine have been somewhat elevated. Resident denies complaints of pain, shortness of breath, chest palpitations, nausea, vomiting, lightheadedness, or dizziness. Not a very reliable historian due to Alzheimer's disease; cannot recall wife  a month ago. Will discuss with staff rehab evaluation versus hospice evaluation. Review of Systems Pertinent positives as noted in HPI.       Allergies:  Reviewed in nursing

## 2020-05-05 ENCOUNTER — VIRTUAL VISIT (OUTPATIENT)
Dept: GERIATRIC MEDICINE | Age: 85
End: 2020-05-05
Payer: MEDICARE

## 2020-05-05 PROCEDURE — 99308 SBSQ NF CARE LOW MDM 20: CPT | Performed by: NURSE PRACTITIONER

## 2020-05-06 PROBLEM — K59.00 CONSTIPATION: Status: ACTIVE | Noted: 2020-05-06

## 2020-05-06 RX ORDER — LIDOCAINE 4 G/G
1 PATCH TOPICAL DAILY
Qty: 14 PATCH | Refills: 0 | Status: SHIPPED | OUTPATIENT
Start: 2020-05-06 | End: 2020-05-20

## 2020-05-06 NOTE — PROGRESS NOTES
shortness of breath, denied chest pain, denied nausea. Review of Systems Pertinent positives as noted in HPI. Allergies:  Reviewed in nursing facility records via point click care  Medications:  Reviewed and reconciled in nursing facility records via point click care      PHYSICAL EXAMINATION:  [ INSTRUCTIONS:  \"[x]\" Indicates a positive item  \"[]\" Indicates a negative item  -- DELETE ALL ITEMS NOT EXAMINED]    [x] Alert  [] Oriented to person    [x] No apparent distress  [] Toxic appearing    [] Face flushed appearing [] Sclera clear  [] Lips are cyanotic      [x] Breathing appears normal  [] Appears tachypneic      [] Rash on visible skin    [] Cranial Nerves II-XII grossly intact    [] Motor grossly intact in visible upper extremities    [] Motor grossly intact in visible lower extremities    [] Normal Mood  [] Anxious appearing    [x] Depressed appearing  [x] Confused appearing      [x] Poor short term memory  [] Poor long term memory    [x] OTHER:      Physical Exam Constitutional:  up in wheelchair, well-developed, elderly, frail  ENT:  conjunctive/lids normal, MMM, no runny nose  Pulmonary/Chest:  breathing unlabored, chest excursion symmetrical, no use of accessory muscles, no shortness of breath, no dyspnea. Lung sounds clear per nursing staff  Cardiovascular:  heart rate normal per point click care vital signs, no edema  Abd/GI:  abdomen, round, non-distended, positive bowel sounds per nursing staff  MS/Extremities:  LOPEZ, ROM limited, no clubbing, no cyanosis  Psych:  A/O x 3, cooperative with care, no anxiety, appropriate mood and affect,   Skin:  color normal, contusion to bridge of nose is healed, skin tear to the right elbow, abrasion right elbow, left shoulder contusion resolved    Due to this being a TeleHealth encounter, evaluation of the following organ systems is limited: Vitals/Constitutional/EENT/Resp/CV/GI//MS/Neuro/Skin/Heme-Lymph-Imm. ASSESSMENT/PLAN:  1.  Insomnia, unspecified

## 2020-05-12 ENCOUNTER — VIRTUAL VISIT (OUTPATIENT)
Dept: GERIATRIC MEDICINE | Age: 85
End: 2020-05-12
Payer: MEDICARE

## 2020-05-12 VITALS
DIASTOLIC BLOOD PRESSURE: 74 MMHG | HEART RATE: 74 BPM | RESPIRATION RATE: 18 BRPM | SYSTOLIC BLOOD PRESSURE: 138 MMHG | TEMPERATURE: 98.5 F | OXYGEN SATURATION: 99 %

## 2020-05-12 PROBLEM — R30.0 DYSURIA: Status: ACTIVE | Noted: 2020-05-12

## 2020-05-12 PROCEDURE — 99308 SBSQ NF CARE LOW MDM 20: CPT | Performed by: NURSE PRACTITIONER

## 2020-05-12 PROCEDURE — 1123F ACP DISCUSS/DSCN MKR DOCD: CPT | Performed by: NURSE PRACTITIONER

## 2020-05-12 RX ORDER — LORAZEPAM 0.5 MG/1
0.5 TABLET ORAL EVERY 4 HOURS PRN
Qty: 60 TABLET | Refills: 0 | Status: SHIPPED | OUTPATIENT
Start: 2020-05-12 | End: 2020-06-11

## 2020-05-12 NOTE — PROGRESS NOTES
EXAMINATION:  [ INSTRUCTIONS:  \"[x]\" Indicates a positive item  \"[]\" Indicates a negative item  -- DELETE ALL ITEMS NOT EXAMINED]    /74   Pulse 74   Temp 98.5 °F (36.9 °C)   Resp 18   SpO2 99%       [x] Alert  [x] Oriented to person/place  [x] No apparent distress  [] Toxic appearing    [] Face flushed appearing [x] Sclera clear  [] Lips are cyanotic      [x] Breathing appears normal  [] Appears tachypneic      [] Rash on visible skin    [] Cranial Nerves II-XII grossly intact    [x] Motor grossly intact in visible upper extremities    [x] Motor grossly intact in visible lower extremities    [] Normal Mood  [] Anxious appearing    [] Depressed appearing  [x] Confused appearing      [x] Poor short term memory  [x] Poor long term memory    [] OTHER:      Physical Exam    Due to this being a TeleHealth encounter, evaluation of the following organ systems is limited: Vitals/Constitutional/EENT/Resp/CV/GI//MS/Neuro/Skin/Heme-Lymph-Imm. ASSESSMENT/PLAN:  1. Dysuria  Please obtain UA and C&S. 2. Constipation, unspecified constipation type  Increase senna plus to 1 p.o. twice daily. An  electronic signature was used to authenticate this note. --Genevieve Carbone, APRN - CNP on 5/12/2020 at 6:42 PM    Pursuant to the emergency declaration under the [address] [place] [address], [facility] waiver authority and the [employer] and [employer] Act, this Virtual  Visit was conducted, with patient's consent, to reduce the patient's risk of exposure to COVID-19 and provide continuity of care for an established patient. Services were provided through a video synchronous discussion virtually to substitute for in-person clinic visit.

## 2020-05-14 LAB — URINE CULTURE, ROUTINE: NORMAL
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
## 2020-05-19 ENCOUNTER — VIRTUAL VISIT (OUTPATIENT)
Dept: GERIATRIC MEDICINE | Age: 85
End: 2020-05-19
Payer: MEDICARE

## 2020-05-19 PROCEDURE — 1123F ACP DISCUSS/DSCN MKR DOCD: CPT | Performed by: NURSE PRACTITIONER

## 2020-05-19 PROCEDURE — 99308 SBSQ NF CARE LOW MDM 20: CPT | Performed by: NURSE PRACTITIONER

## 2020-05-19 ASSESSMENT — ENCOUNTER SYMPTOMS
RESPIRATORY NEGATIVE: 1
CONSTIPATION: 1

## 2020-05-19 NOTE — PROGRESS NOTES
Name: Otilia 1923: [facility]   Thaddeus hatch  5/19/2020    TELEHEALTH EVALUATION -- Audio/Visual (During LWTXQ-39 public health emergency)    Due to [place] 19 outbreak, patient's office visit was converted to a virtual visit. Patient was contacted and agreed to proceed with a virtual visit via \"Doxy. me\" and nursing assistance on site. The risks and benefits of converting to a virtual visit were discussed in light of the current infectious disease epidemic. Patient also understood that insurance coverage and co-pays are up to their individual insurance plans. Chief Complaint   Patient presents with    Blood Sugar Problem    Constipation       HPI:  Babar Hagan is a 80 y.o. male being seen today for an audio/video evaluation for the following concern(s):     Diagnosis Orders   1. Type 2 diabetes mellitus without retinopathy ([facility] [address]     2. Constipation, unspecified constipation type       See resident for acute follow-up visit. Resident is currently a hospice patient, daughter is requesting that blood sugar checks be discontinued. Resident has not required any insulin coverage, blood sugars have been within acceptable range. He is currently on metformin 500 mg twice daily. Resident is excited about having blood sugar sticks discontinued, he states \"I do not like those. \"  Family was not doing blood sugar checks while he was at home, therefore daughter is requesting that we get rid of it. Resident has history of dementia that is advanced, therefore would not be able to make these decisions request this action himself. Nursing staff also report that resident is still having days when he goes without a bowel movement. He is currently on senna S twice daily. Over the weekend resident had to be given a suppository and an enema, had a large result in bowel movement after both.     Review of Systems   Constitutional: Negative for activity change, appetite change and fatigue. Respiratory: Negative. Cardiovascular: Negative. Gastrointestinal: Positive for constipation. Genitourinary: Negative. Musculoskeletal: Positive for gait problem, myalgias, neck pain and neck stiffness. Skin: Negative. Psychiatric/Behavioral: Positive for confusion and sleep disturbance. Negative for agitation and behavioral problems. The patient is not nervous/anxious. Allergies:  Reviewed in nursing facility records via point click care  Medications:  Reviewed and reconciled in nursing facility records via point click care      PHYSICAL EXAMINATION:  [ INSTRUCTIONS:  \"[x]\" Indicates a positive item  \"[]\" Indicates a negative item  -- DELETE ALL ITEMS NOT EXAMINED]    There were no vitals taken for this visit. [x] Alert  [x] Oriented to person    [x] No apparent distress  [] Toxic appearing    [] Face flushed appearing [x] Sclera clear  [] Lips are cyanotic      [x] Breathing appears normal  [] Appears tachypneic      [] Rash on visible skin    [] Cranial Nerves II-XII grossly intact    [] Motor grossly intact in visible upper extremities    [] Motor grossly intact in visible lower extremities    [] Normal Mood  [] Anxious appearing    [] Depressed appearing  [x] Confused appearing      [x] Poor short term memory  [x] Poor long term memory    [x] OTHER:      Physical Exam Constitutional:  up in wheelchair, thin, elderly, frail  ENT:  conjunctive/lids normal, MMM, no runny nose  Pulmonary/Chest:  breathing unlabored, chest excursion symmetrical, no use of accessory muscles, no shortness of breath, no dyspnea.  Lung sounds clear diminished in the bases per nursing staff  Cardiovascular:  heart rate normal per point click care vital signs, no edema  Abd/GI:  abdomen, round, non-distended, positive bowel sounds per nursing staff  MS/Extremities:  LOPEZ, ROM limited, no clubbing, no cyanosis  Psych:  A/O x 1, cooperative with care, no anxiety, appropriate mood and affect, Due to this being a TeleHealth encounter, evaluation of the following organ systems is limited: Vitals/Constitutional/EENT/Resp/CV/GI//MS/Neuro/Skin/Heme-Lymph-Imm. ASSESSMENT/PLAN:  1. Type 2 diabetes mellitus without retinopathy ([facility] [address]  Continue Accu-Cheks. Continue metformin as ordered. 2. Constipation  Miralax 17 gms po daily. An  electronic signature was used to authenticate this note. --FELICIANO Hancock CNP on 5/19/2020 at 6:42 PM    Reviewed labs:  Yes    Pursuant to the emergency declaration under the [facility] waiver authority and the [employer] and [employer] Act, this Virtual  Visit was conducted, with patient's consent, to reduce the patient's risk of exposure to COVID-19 and provide continuity of care for an established patient. Services were provided through a video synchronous discussion virtually to substitute for in-person clinic visit.

## 2020-05-26 ENCOUNTER — VIRTUAL VISIT (OUTPATIENT)
Dept: GERIATRIC MEDICINE | Age: 85
End: 2020-05-26
Payer: MEDICARE

## 2020-05-26 PROCEDURE — 99308 SBSQ NF CARE LOW MDM 20: CPT | Performed by: NURSE PRACTITIONER

## 2020-05-26 PROCEDURE — 1123F ACP DISCUSS/DSCN MKR DOCD: CPT | Performed by: NURSE PRACTITIONER

## 2020-05-27 PROBLEM — S40.029A CONTUSION, SHOULDER AND UPPER ARM, MULTIPLE SITES: Status: ACTIVE | Noted: 2020-05-27

## 2020-05-27 PROBLEM — S09.90XA CLOSED INJURY OF HEAD: Status: ACTIVE | Noted: 2020-05-27

## 2020-05-27 PROBLEM — R41.82 ALTERED MENTAL STATUS: Status: ACTIVE | Noted: 2020-05-27

## 2020-05-27 PROBLEM — S40.019A CONTUSION, SHOULDER AND UPPER ARM, MULTIPLE SITES: Status: ACTIVE | Noted: 2020-05-27

## 2020-05-29 ENCOUNTER — VIRTUAL VISIT (OUTPATIENT)
Dept: GERIATRIC MEDICINE | Age: 85
End: 2020-05-29
Payer: MEDICARE

## 2020-05-29 VITALS
OXYGEN SATURATION: 96 % | DIASTOLIC BLOOD PRESSURE: 84 MMHG | RESPIRATION RATE: 18 BRPM | HEART RATE: 73 BPM | SYSTOLIC BLOOD PRESSURE: 119 MMHG | TEMPERATURE: 97.6 F

## 2020-05-29 PROCEDURE — 99308 SBSQ NF CARE LOW MDM 20: CPT | Performed by: NURSE PRACTITIONER
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
## 2020-06-22 VITALS
TEMPERATURE: 97.9 F | SYSTOLIC BLOOD PRESSURE: 154 MMHG | OXYGEN SATURATION: 96 % | HEART RATE: 77 BPM | DIASTOLIC BLOOD PRESSURE: 82 MMHG | RESPIRATION RATE: 18 BRPM

## 2020-06-22 PROBLEM — F03.918 DEMENTIA WITH BEHAVIORAL DISTURBANCE: Status: ACTIVE | Noted: 2020-03-03

## 2020-06-22 ASSESSMENT — ENCOUNTER SYMPTOMS: BACK PAIN: 1

## 2020-06-23 NOTE — PROGRESS NOTES
Negative for activity change and appetite change. Cardiovascular: Negative for chest pain and leg swelling. Musculoskeletal: Positive for back pain and gait problem. Neurological: Negative for dizziness and light-headedness. Psychiatric/Behavioral: Positive for behavioral problems, confusion and sleep disturbance. Allergies:  Reviewed in nursing facility records via point click care  Medications:  Reviewed and reconciled in nursing facility records via point click care      PHYSICAL EXAMINATION:  [ INSTRUCTIONS:  \"[x]\" Indicates a positive item  \"[]\" Indicates a negative item  -- DELETE ALL ITEMS NOT EXAMINED]    BP (!) 154/82   Pulse 77   Temp 97.9 °F (36.6 °C)   Resp 18   SpO2 96%       [x] Alert  [] Oriented to person/place/time    [x] No apparent distress  [] Toxic appearing    [] Face flushed appearing [x] Sclera clear  [] Lips are cyanotic      [x] Breathing appears normal  [] Appears tachypneic      [] Rash on visible skin    [] Cranial Nerves II-XII grossly intact    [x] Motor grossly intact in visible upper extremities    [x] Motor grossly intact in visible lower extremities    [] Normal Mood  [] Anxious appearing    [] Depressed appearing  [x] Confused appearing      [x] Poor short term memory  [x] Poor long term memory    [x] OTHER:      Physical Exam Constitutional:  In bed, elderly, frail  ENT:  conjunctive/lids normal, MMM, no runny nose  Pulmonary/Chest:  breathing unlabored, chest excursion symmetrical, no use of accessory muscles, no shortness of breath, no dyspnea.  Lung sounds clear per nursing staff  Cardiovascular:  heart rate normal per point click care vital signs, no edema  Abd/GI:  abdomen, round, non-distended, positive bowel sounds per nursing staff  MS/Extremities:  LOPEZ, unsteady gait, ROM limited, no clubbing, no cyanosis  Psych:  A/O x 1, uncooperative with care at times, does not follow direction, no anxiety, depressed mood and flat affect,   Skin:  color normal, no